# Patient Record
Sex: FEMALE | Race: WHITE | Employment: OTHER | URBAN - METROPOLITAN AREA
[De-identification: names, ages, dates, MRNs, and addresses within clinical notes are randomized per-mention and may not be internally consistent; named-entity substitution may affect disease eponyms.]

---

## 2018-11-09 ENCOUNTER — HOSPITAL ENCOUNTER (OUTPATIENT)
Dept: GENERAL RADIOLOGY | Age: 58
Discharge: HOME | End: 2018-11-09
Attending: SURGERY
Payer: COMMERCIAL

## 2018-11-09 ENCOUNTER — ANESTHESIA EVENT (OUTPATIENT)
Dept: SURGERY | Age: 58
DRG: 421 | End: 2018-11-09
Payer: COMMERCIAL

## 2018-11-09 ENCOUNTER — HOSPITAL ENCOUNTER (OUTPATIENT)
Dept: SURGERY | Age: 58
Discharge: HOME | End: 2018-11-09
Attending: SURGERY
Payer: COMMERCIAL

## 2018-11-09 ENCOUNTER — HOSPITAL ENCOUNTER (OUTPATIENT)
Dept: CT IMAGING | Age: 58
Discharge: HOME | End: 2018-11-09
Attending: SURGERY
Payer: COMMERCIAL

## 2018-11-09 VITALS
HEART RATE: 83 BPM | TEMPERATURE: 98 F | OXYGEN SATURATION: 100 % | WEIGHT: 220.38 LBS | HEIGHT: 62 IN | DIASTOLIC BLOOD PRESSURE: 58 MMHG | SYSTOLIC BLOOD PRESSURE: 118 MMHG | BODY MASS INDEX: 40.55 KG/M2 | RESPIRATION RATE: 18 BRPM

## 2018-11-09 DIAGNOSIS — K86.89 PANCREATIC MASS: ICD-10-CM

## 2018-11-09 LAB
ALBUMIN SERPL-MCNC: 3.6 G/DL (ref 3.5–5)
ALBUMIN/GLOB SERPL: 0.9 {RATIO} (ref 1.2–3.5)
ALP SERPL-CCNC: 106 U/L (ref 50–136)
ALT SERPL-CCNC: 26 U/L (ref 12–65)
ANION GAP SERPL CALC-SCNC: 7 MMOL/L (ref 7–16)
APPEARANCE UR: CLEAR
AST SERPL-CCNC: 17 U/L (ref 15–37)
BACTERIA URNS QL MICRO: 0 /HPF
BASOPHILS # BLD: 0.1 K/UL (ref 0–0.2)
BASOPHILS NFR BLD: 1 % (ref 0–2)
BILIRUB SERPL-MCNC: 0.4 MG/DL (ref 0.2–1.1)
BILIRUB UR QL: NEGATIVE
BUN SERPL-MCNC: 5 MG/DL (ref 6–23)
CALCIUM SERPL-MCNC: 8.9 MG/DL (ref 8.3–10.4)
CASTS URNS QL MICRO: ABNORMAL /LPF
CEA SERPL-MCNC: 5.2 NG/ML (ref 0–3)
CHLORIDE SERPL-SCNC: 99 MMOL/L (ref 98–107)
CO2 SERPL-SCNC: 30 MMOL/L (ref 21–32)
COLOR UR: YELLOW
CREAT BLD-MCNC: 0.7 MG/DL (ref 0.8–1.5)
CREAT SERPL-MCNC: 0.73 MG/DL (ref 0.6–1)
DIFFERENTIAL METHOD BLD: ABNORMAL
EOSINOPHIL # BLD: 0.3 K/UL (ref 0–0.8)
EOSINOPHIL NFR BLD: 2 % (ref 0.5–7.8)
EPI CELLS #/AREA URNS HPF: ABNORMAL /HPF
ERYTHROCYTE [DISTWIDTH] IN BLOOD BY AUTOMATED COUNT: 14.9 %
GLOBULIN SER CALC-MCNC: 4.1 G/DL (ref 2.3–3.5)
GLUCOSE SERPL-MCNC: 147 MG/DL (ref 65–100)
GLUCOSE UR STRIP.AUTO-MCNC: NEGATIVE MG/DL
HCT VFR BLD AUTO: 41.4 % (ref 35.8–46.3)
HGB BLD-MCNC: 13.6 G/DL (ref 11.7–15.4)
HGB UR QL STRIP: ABNORMAL
IMM GRANULOCYTES # BLD: 0.1 K/UL (ref 0–0.5)
IMM GRANULOCYTES NFR BLD AUTO: 0 % (ref 0–5)
INR PPP: 1.1
KETONES UR QL STRIP.AUTO: NEGATIVE MG/DL
LEUKOCYTE ESTERASE UR QL STRIP.AUTO: NEGATIVE
LYMPHOCYTES # BLD: 2.5 K/UL (ref 0.5–4.6)
LYMPHOCYTES NFR BLD: 18 % (ref 13–44)
MCH RBC QN AUTO: 32.4 PG (ref 26.1–32.9)
MCHC RBC AUTO-ENTMCNC: 32.9 G/DL (ref 31.4–35)
MCV RBC AUTO: 98.6 FL (ref 79.6–97.8)
MONOCYTES # BLD: 0.7 K/UL (ref 0.1–1.3)
MONOCYTES NFR BLD: 5 % (ref 4–12)
NEUTS SEG # BLD: 9.9 K/UL (ref 1.7–8.2)
NEUTS SEG NFR BLD: 73 % (ref 43–78)
NITRITE UR QL STRIP.AUTO: NEGATIVE
NRBC # BLD: 0 K/UL (ref 0–0.2)
PH UR STRIP: 5.5 [PH] (ref 5–9)
PLATELET # BLD AUTO: 402 K/UL (ref 150–450)
PMV BLD AUTO: 10.3 FL (ref 9.4–12.3)
POTASSIUM SERPL-SCNC: 3.3 MMOL/L (ref 3.5–5.1)
PROT SERPL-MCNC: 7.7 G/DL (ref 6.3–8.2)
PROT UR STRIP-MCNC: NEGATIVE MG/DL
PROTHROMBIN TIME: 13.8 SEC (ref 11.5–14.5)
RBC # BLD AUTO: 4.2 M/UL (ref 4.05–5.2)
RBC #/AREA URNS HPF: ABNORMAL /HPF
SODIUM SERPL-SCNC: 136 MMOL/L (ref 136–145)
SP GR UR REFRACTOMETRY: 1.02 (ref 1–1.02)
UROBILINOGEN UR QL STRIP.AUTO: 0.2 EU/DL (ref 0.2–1)
WBC # BLD AUTO: 13.6 K/UL (ref 4.3–11.1)
WBC URNS QL MICRO: ABNORMAL /HPF

## 2018-11-09 PROCEDURE — 74011636320 HC RX REV CODE- 636/320: Performed by: SURGERY

## 2018-11-09 PROCEDURE — 71260 CT THORAX DX C+: CPT

## 2018-11-09 PROCEDURE — 82565 ASSAY OF CREATININE: CPT

## 2018-11-09 PROCEDURE — 80053 COMPREHEN METABOLIC PANEL: CPT

## 2018-11-09 PROCEDURE — 93005 ELECTROCARDIOGRAM TRACING: CPT | Performed by: SURGERY

## 2018-11-09 PROCEDURE — 36415 COLL VENOUS BLD VENIPUNCTURE: CPT

## 2018-11-09 PROCEDURE — 71046 X-RAY EXAM CHEST 2 VIEWS: CPT

## 2018-11-09 PROCEDURE — 82378 CARCINOEMBRYONIC ANTIGEN: CPT

## 2018-11-09 PROCEDURE — 85610 PROTHROMBIN TIME: CPT

## 2018-11-09 PROCEDURE — 85025 COMPLETE CBC W/AUTO DIFF WBC: CPT

## 2018-11-09 PROCEDURE — 86301 IMMUNOASSAY TUMOR CA 19-9: CPT

## 2018-11-09 PROCEDURE — 74011000258 HC RX REV CODE- 258: Performed by: SURGERY

## 2018-11-09 PROCEDURE — 81001 URINALYSIS AUTO W/SCOPE: CPT

## 2018-11-09 RX ORDER — SODIUM CHLORIDE 0.9 % (FLUSH) 0.9 %
5-10 SYRINGE (ML) INJECTION AS NEEDED
Status: CANCELLED | OUTPATIENT
Start: 2018-11-09

## 2018-11-09 RX ORDER — SODIUM CHLORIDE 0.9 % (FLUSH) 0.9 %
5-10 SYRINGE (ML) INJECTION EVERY 8 HOURS
Status: CANCELLED | OUTPATIENT
Start: 2018-11-09

## 2018-11-09 RX ORDER — SODIUM CHLORIDE 0.9 % (FLUSH) 0.9 %
10 SYRINGE (ML) INJECTION
Status: COMPLETED | OUTPATIENT
Start: 2018-11-09 | End: 2018-11-09

## 2018-11-09 RX ADMIN — SODIUM CHLORIDE 100 ML: 900 INJECTION, SOLUTION INTRAVENOUS at 16:19

## 2018-11-09 RX ADMIN — Medication 10 ML: at 16:19

## 2018-11-09 RX ADMIN — IOPAMIDOL 80 ML: 755 INJECTION, SOLUTION INTRAVENOUS at 16:19

## 2018-11-09 NOTE — ANESTHESIA PREPROCEDURE EVALUATION
Anesthetic History No history of anesthetic complications Review of Systems / Medical History Patient summary reviewed and pertinent labs reviewed Pulmonary Smoker (Current everyday smoker) Neuro/Psych Within defined limits Cardiovascular Hypertension: well controlled Hyperlipidemia Exercise tolerance: >4 METS Comments: Denies CP, SOB or changes in functional status EKG--SR, RBBB  
GI/Hepatic/Renal 
Within defined limits Endo/Other Morbid obesity Other Findings Physical Exam 
 
Airway Mallampati: III 
TM Distance: 4 - 6 cm Neck ROM: normal range of motion Mouth opening: Normal 
 
 Cardiovascular Rhythm: regular Rate: normal 
 
 
 
 Dental 
 
Dentition: Caps/crowns Pulmonary Breath sounds clear to auscultation Abdominal 
GI exam deferred Other Findings Anesthetic Plan ASA: 3 Anesthesia type: general 
 
Monitoring Plan: Arterial line Induction: Intravenous Anesthetic plan and risks discussed with: Patient and Spouse

## 2018-11-10 LAB
ATRIAL RATE: 77 BPM
CALCULATED P AXIS, ECG09: 59 DEGREES
CALCULATED R AXIS, ECG10: -112 DEGREES
CALCULATED T AXIS, ECG11: 61 DEGREES
CANCER AG19-9 SERPL-ACNC: 572.4 U/ML (ref 2–37)
DIAGNOSIS, 93000: NORMAL
P-R INTERVAL, ECG05: 140 MS
Q-T INTERVAL, ECG07: 418 MS
QRS DURATION, ECG06: 122 MS
QTC CALCULATION (BEZET), ECG08: 473 MS
VENTRICULAR RATE, ECG03: 77 BPM

## 2018-11-12 ENCOUNTER — HOSPITAL ENCOUNTER (INPATIENT)
Age: 58
LOS: 7 days | Discharge: HOME | DRG: 421 | End: 2018-11-19
Attending: SURGERY | Admitting: SURGERY
Payer: COMMERCIAL

## 2018-11-12 ENCOUNTER — ANESTHESIA (OUTPATIENT)
Dept: SURGERY | Age: 58
DRG: 421 | End: 2018-11-12
Payer: COMMERCIAL

## 2018-11-12 DIAGNOSIS — K86.89 MASS OF PANCREAS: Primary | ICD-10-CM

## 2018-11-12 LAB
BASE EXCESS BLD CALC-SCNC: 2 MMOL/L
CA-I BLD-MCNC: 1.16 MMOL/L (ref 1.12–1.32)
GLUCOSE BLD STRIP.AUTO-MCNC: 191 MG/DL (ref 65–100)
HCO3 BLD-SCNC: 26 MMOL/L (ref 22–26)
PCO2 BLD: 38.5 MMHG (ref 35–45)
PH BLD: 7.44 [PH] (ref 7.35–7.45)
PO2 BLD: 105 MMHG (ref 75–100)
POTASSIUM BLD-SCNC: 3.5 MMOL/L (ref 3.5–5.1)
SAO2 % BLD: 98 % (ref 95–98)
SODIUM BLD-SCNC: 137 MMOL/L (ref 136–145)

## 2018-11-12 PROCEDURE — 77030008715 HC TU FEED GASTMY COVD -A: Performed by: SURGERY

## 2018-11-12 PROCEDURE — 74011000250 HC RX REV CODE- 250: Performed by: SURGERY

## 2018-11-12 PROCEDURE — 65270000029 HC RM PRIVATE

## 2018-11-12 PROCEDURE — 86901 BLOOD TYPING SEROLOGIC RH(D): CPT

## 2018-11-12 PROCEDURE — 77030020255 HC SOL INJ LR 1000ML BG

## 2018-11-12 PROCEDURE — 77030012406 HC DRN WND PENRS BARD -A: Performed by: SURGERY

## 2018-11-12 PROCEDURE — 77030005306 HC CATH NG RADPQ COVD -A: Performed by: SURGERY

## 2018-11-12 PROCEDURE — 74011250636 HC RX REV CODE- 250/636: Performed by: ANESTHESIOLOGY

## 2018-11-12 PROCEDURE — 88307 TISSUE EXAM BY PATHOLOGIST: CPT

## 2018-11-12 PROCEDURE — 74011250636 HC RX REV CODE- 250/636

## 2018-11-12 PROCEDURE — 77030027138 HC INCENT SPIROMETER -A

## 2018-11-12 PROCEDURE — 84295 ASSAY OF SERUM SODIUM: CPT

## 2018-11-12 PROCEDURE — 77030019908 HC STETH ESOPH SIMS -A: Performed by: ANESTHESIOLOGY

## 2018-11-12 PROCEDURE — 77030003029 HC SUT VCRL J&J -B: Performed by: SURGERY

## 2018-11-12 PROCEDURE — 77030034850: Performed by: SURGERY

## 2018-11-12 PROCEDURE — 0DB60ZZ EXCISION OF STOMACH, OPEN APPROACH: ICD-10-PCS | Performed by: SURGERY

## 2018-11-12 PROCEDURE — 77030031139 HC SUT VCRL2 J&J -A: Performed by: SURGERY

## 2018-11-12 PROCEDURE — 76060000038 HC ANESTHESIA 3.5 TO 4 HR: Performed by: SURGERY

## 2018-11-12 PROCEDURE — 77030014008 HC SPNG HEMSTAT J&J -C: Performed by: SURGERY

## 2018-11-12 PROCEDURE — 77030002967 HC SUT PDS J&J -B: Performed by: SURGERY

## 2018-11-12 PROCEDURE — 77030025303 HC STPLR ENDOSC J&J -G: Performed by: SURGERY

## 2018-11-12 PROCEDURE — 77030036736 HC RELD STPLR ENDOSC J&J -F: Performed by: SURGERY

## 2018-11-12 PROCEDURE — 07TP0ZZ RESECTION OF SPLEEN, OPEN APPROACH: ICD-10-PCS | Performed by: SURGERY

## 2018-11-12 PROCEDURE — 0D160ZA BYPASS STOMACH TO JEJUNUM, OPEN APPROACH: ICD-10-PCS | Performed by: SURGERY

## 2018-11-12 PROCEDURE — 0FBG0ZX EXCISION OF PANCREAS, OPEN APPROACH, DIAGNOSTIC: ICD-10-PCS | Performed by: SURGERY

## 2018-11-12 PROCEDURE — 74011000250 HC RX REV CODE- 250

## 2018-11-12 PROCEDURE — 76210000006 HC OR PH I REC 0.5 TO 1 HR: Performed by: SURGERY

## 2018-11-12 PROCEDURE — 77030019938 HC TBNG IV PCA ICUM -A

## 2018-11-12 PROCEDURE — 77030018846 HC SOL IRR STRL H20 ICUM -A: Performed by: SURGERY

## 2018-11-12 PROCEDURE — 77030006565 HC BG DRN BILE BARD -A: Performed by: SURGERY

## 2018-11-12 PROCEDURE — 77030002996 HC SUT SLK J&J -A: Performed by: SURGERY

## 2018-11-12 PROCEDURE — 77030020782 HC GWN BAIR PAWS FLX 3M -B: Performed by: ANESTHESIOLOGY

## 2018-11-12 PROCEDURE — 77030012411 HC DRN WND CARD -A: Performed by: SURGERY

## 2018-11-12 PROCEDURE — 77030037088 HC TUBE ENDOTRACH ORAL NSL COVD-A: Performed by: ANESTHESIOLOGY

## 2018-11-12 PROCEDURE — 77030018836 HC SOL IRR NACL ICUM -A: Performed by: SURGERY

## 2018-11-12 PROCEDURE — 88305 TISSUE EXAM BY PATHOLOGIST: CPT

## 2018-11-12 PROCEDURE — C9113 INJ PANTOPRAZOLE SODIUM, VIA: HCPCS | Performed by: SURGERY

## 2018-11-12 PROCEDURE — 74011000258 HC RX REV CODE- 258: Performed by: SURGERY

## 2018-11-12 PROCEDURE — 77030011264 HC ELECTRD BLD EXT COVD -A: Performed by: SURGERY

## 2018-11-12 PROCEDURE — 77030008771 HC TU NG SALEM SUMP -A: Performed by: ANESTHESIOLOGY

## 2018-11-12 PROCEDURE — 77030008771 HC TU NG SALEM SUMP -A: Performed by: SURGERY

## 2018-11-12 PROCEDURE — 74011250637 HC RX REV CODE- 250/637: Performed by: ANESTHESIOLOGY

## 2018-11-12 PROCEDURE — 77030002986 HC SUT PROL J&J -A: Performed by: SURGERY

## 2018-11-12 PROCEDURE — 77030008522 HC TBNG INSUF LAPRO STRY -B: Performed by: SURGERY

## 2018-11-12 PROCEDURE — 77030036732 HC RELD STPLR VASC J&J -F: Performed by: SURGERY

## 2018-11-12 PROCEDURE — 77030034849: Performed by: SURGERY

## 2018-11-12 PROCEDURE — 76010000174 HC OR TIME 3.5 TO 4 HR INTENSV-TIER 1: Performed by: SURGERY

## 2018-11-12 PROCEDURE — 77030034155 HC SHR COAG HARM ACE J&J -E: Performed by: SURGERY

## 2018-11-12 PROCEDURE — 77030022473 HC HNDL ENDO GIA UNIV USDA -C: Performed by: SURGERY

## 2018-11-12 PROCEDURE — 77030003503 HC NDL BIOP TISS BD -B: Performed by: SURGERY

## 2018-11-12 PROCEDURE — 77030008467 HC STPLR SKN COVD -B: Performed by: SURGERY

## 2018-11-12 PROCEDURE — 82803 BLOOD GASES ANY COMBINATION: CPT

## 2018-11-12 PROCEDURE — 77030013292 HC BOWL MX PRSM J&J -A: Performed by: ANESTHESIOLOGY

## 2018-11-12 PROCEDURE — 77030035048 HC TRCR ENDOSC OPTCL COVD -B: Performed by: SURGERY

## 2018-11-12 PROCEDURE — 77030027876 HC STPLR ENDOSC FLX PWR J&J -G1: Performed by: SURGERY

## 2018-11-12 PROCEDURE — 77030016151 HC PROTCTR LNS DFOG COVD -B: Performed by: SURGERY

## 2018-11-12 PROCEDURE — 77030039425 HC BLD LARYNG TRULITE DISP TELE -A: Performed by: ANESTHESIOLOGY

## 2018-11-12 PROCEDURE — 77030032490 HC SLV COMPR SCD KNE COVD -B: Performed by: SURGERY

## 2018-11-12 PROCEDURE — 77030010294 HC STPLR INT TI J&J -C: Performed by: SURGERY

## 2018-11-12 PROCEDURE — 77030005401 HC CATH RAD ARRO -A: Performed by: ANESTHESIOLOGY

## 2018-11-12 PROCEDURE — 77030010296 HC STPLR INT COVD -B: Performed by: SURGERY

## 2018-11-12 PROCEDURE — 77030009985 HC RELD STPLR XR J&J -B: Performed by: SURGERY

## 2018-11-12 PROCEDURE — 74011250636 HC RX REV CODE- 250/636: Performed by: SURGERY

## 2018-11-12 PROCEDURE — 86923 COMPATIBILITY TEST ELECTRIC: CPT

## 2018-11-12 PROCEDURE — 77030013794 HC KT TRNSDUC BLD EDWD -B: Performed by: ANESTHESIOLOGY

## 2018-11-12 PROCEDURE — 77030020407 HC IV BLD WRMR ST 3M -A: Performed by: ANESTHESIOLOGY

## 2018-11-12 PROCEDURE — 77030013567 HC DRN WND RESERV BARD -A: Performed by: SURGERY

## 2018-11-12 PROCEDURE — 77030009978 HC RELD STPLR TCR J&J -B: Performed by: SURGERY

## 2018-11-12 PROCEDURE — 77030002966 HC SUT PDS J&J -A: Performed by: SURGERY

## 2018-11-12 RX ORDER — FENTANYL CITRATE 50 UG/ML
25 INJECTION, SOLUTION INTRAMUSCULAR; INTRAVENOUS ONCE
Status: DISCONTINUED | OUTPATIENT
Start: 2018-11-12 | End: 2018-11-12 | Stop reason: HOSPADM

## 2018-11-12 RX ORDER — ROCURONIUM BROMIDE 10 MG/ML
INJECTION, SOLUTION INTRAVENOUS AS NEEDED
Status: DISCONTINUED | OUTPATIENT
Start: 2018-11-12 | End: 2018-11-12 | Stop reason: HOSPADM

## 2018-11-12 RX ORDER — SODIUM CHLORIDE, SODIUM LACTATE, POTASSIUM CHLORIDE, CALCIUM CHLORIDE 600; 310; 30; 20 MG/100ML; MG/100ML; MG/100ML; MG/100ML
75 INJECTION, SOLUTION INTRAVENOUS CONTINUOUS
Status: DISCONTINUED | OUTPATIENT
Start: 2018-11-12 | End: 2018-11-16

## 2018-11-12 RX ORDER — HYDROMORPHONE HYDROCHLORIDE 1 MG/ML
1 INJECTION, SOLUTION INTRAMUSCULAR; INTRAVENOUS; SUBCUTANEOUS
Status: COMPLETED | OUTPATIENT
Start: 2018-11-12 | End: 2018-11-12

## 2018-11-12 RX ORDER — SODIUM CHLORIDE, SODIUM LACTATE, POTASSIUM CHLORIDE, CALCIUM CHLORIDE 600; 310; 30; 20 MG/100ML; MG/100ML; MG/100ML; MG/100ML
100 INJECTION, SOLUTION INTRAVENOUS CONTINUOUS
Status: DISCONTINUED | OUTPATIENT
Start: 2018-11-12 | End: 2018-11-12 | Stop reason: HOSPADM

## 2018-11-12 RX ORDER — GABAPENTIN 300 MG/1
600 CAPSULE ORAL ONCE
Status: COMPLETED | OUTPATIENT
Start: 2018-11-12 | End: 2018-11-12

## 2018-11-12 RX ORDER — PROPOFOL 10 MG/ML
INJECTION, EMULSION INTRAVENOUS AS NEEDED
Status: DISCONTINUED | OUTPATIENT
Start: 2018-11-12 | End: 2018-11-12 | Stop reason: HOSPADM

## 2018-11-12 RX ORDER — SODIUM CHLORIDE 0.9 % (FLUSH) 0.9 %
5-10 SYRINGE (ML) INJECTION EVERY 8 HOURS
Status: DISCONTINUED | OUTPATIENT
Start: 2018-11-12 | End: 2018-11-12 | Stop reason: HOSPADM

## 2018-11-12 RX ORDER — ONDANSETRON 2 MG/ML
INJECTION INTRAMUSCULAR; INTRAVENOUS AS NEEDED
Status: DISCONTINUED | OUTPATIENT
Start: 2018-11-12 | End: 2018-11-12 | Stop reason: HOSPADM

## 2018-11-12 RX ORDER — VECURONIUM BROMIDE FOR INJECTION 1 MG/ML
INJECTION, POWDER, LYOPHILIZED, FOR SOLUTION INTRAVENOUS AS NEEDED
Status: DISCONTINUED | OUTPATIENT
Start: 2018-11-12 | End: 2018-11-12 | Stop reason: HOSPADM

## 2018-11-12 RX ORDER — OXYCODONE HYDROCHLORIDE 5 MG/1
5 TABLET ORAL
Status: DISCONTINUED | OUTPATIENT
Start: 2018-11-12 | End: 2018-11-12 | Stop reason: HOSPADM

## 2018-11-12 RX ORDER — ENOXAPARIN SODIUM 100 MG/ML
40 INJECTION SUBCUTANEOUS EVERY 24 HOURS
Status: DISCONTINUED | OUTPATIENT
Start: 2018-11-12 | End: 2018-11-19 | Stop reason: HOSPADM

## 2018-11-12 RX ORDER — SODIUM CHLORIDE 9 MG/ML
50 INJECTION, SOLUTION INTRAVENOUS CONTINUOUS
Status: DISCONTINUED | OUTPATIENT
Start: 2018-11-12 | End: 2018-11-12 | Stop reason: HOSPADM

## 2018-11-12 RX ORDER — SODIUM CHLORIDE 0.9 % (FLUSH) 0.9 %
5-10 SYRINGE (ML) INJECTION AS NEEDED
Status: DISCONTINUED | OUTPATIENT
Start: 2018-11-12 | End: 2018-11-19 | Stop reason: HOSPADM

## 2018-11-12 RX ORDER — FAMOTIDINE 20 MG/1
20 TABLET, FILM COATED ORAL ONCE
Status: COMPLETED | OUTPATIENT
Start: 2018-11-12 | End: 2018-11-12

## 2018-11-12 RX ORDER — SODIUM CHLORIDE 0.9 % (FLUSH) 0.9 %
5-10 SYRINGE (ML) INJECTION AS NEEDED
Status: DISCONTINUED | OUTPATIENT
Start: 2018-11-12 | End: 2018-11-12 | Stop reason: HOSPADM

## 2018-11-12 RX ORDER — HYDROMORPHONE HYDROCHLORIDE 2 MG/ML
0.5 INJECTION, SOLUTION INTRAMUSCULAR; INTRAVENOUS; SUBCUTANEOUS
Status: DISCONTINUED | OUTPATIENT
Start: 2018-11-12 | End: 2018-11-12 | Stop reason: HOSPADM

## 2018-11-12 RX ORDER — SODIUM CHLORIDE 0.9 % (FLUSH) 0.9 %
5-10 SYRINGE (ML) INJECTION EVERY 8 HOURS
Status: DISCONTINUED | OUTPATIENT
Start: 2018-11-12 | End: 2018-11-19 | Stop reason: HOSPADM

## 2018-11-12 RX ORDER — MIDAZOLAM HYDROCHLORIDE 1 MG/ML
2 INJECTION, SOLUTION INTRAMUSCULAR; INTRAVENOUS
Status: COMPLETED | OUTPATIENT
Start: 2018-11-12 | End: 2018-11-12

## 2018-11-12 RX ORDER — SODIUM CHLORIDE 9 MG/ML
250 INJECTION, SOLUTION INTRAVENOUS AS NEEDED
Status: DISCONTINUED | OUTPATIENT
Start: 2018-11-12 | End: 2018-11-19 | Stop reason: HOSPADM

## 2018-11-12 RX ORDER — EPHEDRINE SULFATE 50 MG/ML
INJECTION, SOLUTION INTRAVENOUS AS NEEDED
Status: DISCONTINUED | OUTPATIENT
Start: 2018-11-12 | End: 2018-11-12 | Stop reason: HOSPADM

## 2018-11-12 RX ORDER — FENTANYL CITRATE 50 UG/ML
INJECTION, SOLUTION INTRAMUSCULAR; INTRAVENOUS AS NEEDED
Status: DISCONTINUED | OUTPATIENT
Start: 2018-11-12 | End: 2018-11-12 | Stop reason: HOSPADM

## 2018-11-12 RX ORDER — NEOSTIGMINE METHYLSULFATE 1 MG/ML
INJECTION INTRAVENOUS AS NEEDED
Status: DISCONTINUED | OUTPATIENT
Start: 2018-11-12 | End: 2018-11-12 | Stop reason: HOSPADM

## 2018-11-12 RX ORDER — HYDROMORPHONE HYDROCHLORIDE 2 MG/ML
INJECTION, SOLUTION INTRAMUSCULAR; INTRAVENOUS; SUBCUTANEOUS AS NEEDED
Status: DISCONTINUED | OUTPATIENT
Start: 2018-11-12 | End: 2018-11-12 | Stop reason: HOSPADM

## 2018-11-12 RX ORDER — LIDOCAINE HYDROCHLORIDE 10 MG/ML
0.1 INJECTION INFILTRATION; PERINEURAL AS NEEDED
Status: DISCONTINUED | OUTPATIENT
Start: 2018-11-12 | End: 2018-11-12 | Stop reason: HOSPADM

## 2018-11-12 RX ORDER — ACETAMINOPHEN 500 MG
1000 TABLET ORAL ONCE
Status: COMPLETED | OUTPATIENT
Start: 2018-11-12 | End: 2018-11-12

## 2018-11-12 RX ORDER — OXYCODONE AND ACETAMINOPHEN 5; 325 MG/1; MG/1
1 TABLET ORAL AS NEEDED
Status: DISCONTINUED | OUTPATIENT
Start: 2018-11-12 | End: 2018-11-12 | Stop reason: HOSPADM

## 2018-11-12 RX ORDER — GLYCOPYRROLATE 0.2 MG/ML
INJECTION INTRAMUSCULAR; INTRAVENOUS AS NEEDED
Status: DISCONTINUED | OUTPATIENT
Start: 2018-11-12 | End: 2018-11-12 | Stop reason: HOSPADM

## 2018-11-12 RX ORDER — ONDANSETRON 2 MG/ML
4 INJECTION INTRAMUSCULAR; INTRAVENOUS
Status: DISCONTINUED | OUTPATIENT
Start: 2018-11-12 | End: 2018-11-19 | Stop reason: HOSPADM

## 2018-11-12 RX ORDER — MIDAZOLAM HYDROCHLORIDE 1 MG/ML
2 INJECTION, SOLUTION INTRAMUSCULAR; INTRAVENOUS ONCE
Status: DISCONTINUED | OUTPATIENT
Start: 2018-11-12 | End: 2018-11-12 | Stop reason: HOSPADM

## 2018-11-12 RX ORDER — DIPHENHYDRAMINE HYDROCHLORIDE 50 MG/ML
12.5 INJECTION, SOLUTION INTRAMUSCULAR; INTRAVENOUS ONCE
Status: DISCONTINUED | OUTPATIENT
Start: 2018-11-12 | End: 2018-11-12 | Stop reason: HOSPADM

## 2018-11-12 RX ORDER — SODIUM CHLORIDE, SODIUM LACTATE, POTASSIUM CHLORIDE, CALCIUM CHLORIDE 600; 310; 30; 20 MG/100ML; MG/100ML; MG/100ML; MG/100ML
INJECTION, SOLUTION INTRAVENOUS
Status: DISCONTINUED | OUTPATIENT
Start: 2018-11-12 | End: 2018-11-12 | Stop reason: HOSPADM

## 2018-11-12 RX ORDER — LIDOCAINE HYDROCHLORIDE 20 MG/ML
INJECTION, SOLUTION EPIDURAL; INFILTRATION; INTRACAUDAL; PERINEURAL AS NEEDED
Status: DISCONTINUED | OUTPATIENT
Start: 2018-11-12 | End: 2018-11-12 | Stop reason: HOSPADM

## 2018-11-12 RX ADMIN — SODIUM CHLORIDE, SODIUM LACTATE, POTASSIUM CHLORIDE, CALCIUM CHLORIDE: 600; 310; 30; 20 INJECTION, SOLUTION INTRAVENOUS at 13:39

## 2018-11-12 RX ADMIN — EPHEDRINE SULFATE 5 MG: 50 INJECTION, SOLUTION INTRAVENOUS at 16:05

## 2018-11-12 RX ADMIN — Medication 10 ML: at 21:59

## 2018-11-12 RX ADMIN — GABAPENTIN 600 MG: 300 CAPSULE ORAL at 12:52

## 2018-11-12 RX ADMIN — SODIUM CHLORIDE, SODIUM LACTATE, POTASSIUM CHLORIDE, CALCIUM CHLORIDE: 600; 310; 30; 20 INJECTION, SOLUTION INTRAVENOUS at 15:12

## 2018-11-12 RX ADMIN — ROCURONIUM BROMIDE 50 MG: 10 INJECTION, SOLUTION INTRAVENOUS at 13:22

## 2018-11-12 RX ADMIN — SODIUM CHLORIDE, SODIUM LACTATE, POTASSIUM CHLORIDE, AND CALCIUM CHLORIDE 125 ML/HR: 600; 310; 30; 20 INJECTION, SOLUTION INTRAVENOUS at 19:08

## 2018-11-12 RX ADMIN — EPHEDRINE SULFATE 10 MG: 50 INJECTION, SOLUTION INTRAVENOUS at 13:33

## 2018-11-12 RX ADMIN — HYDROMORPHONE HYDROCHLORIDE 0.5 MG: 2 INJECTION, SOLUTION INTRAMUSCULAR; INTRAVENOUS; SUBCUTANEOUS at 17:21

## 2018-11-12 RX ADMIN — VECURONIUM BROMIDE FOR INJECTION 2 MG: 1 INJECTION, POWDER, LYOPHILIZED, FOR SOLUTION INTRAVENOUS at 15:48

## 2018-11-12 RX ADMIN — FENTANYL CITRATE 50 MCG: 50 INJECTION, SOLUTION INTRAMUSCULAR; INTRAVENOUS at 13:53

## 2018-11-12 RX ADMIN — NEOSTIGMINE METHYLSULFATE 5 MG: 1 INJECTION INTRAVENOUS at 16:51

## 2018-11-12 RX ADMIN — EPHEDRINE SULFATE 2.5 MG: 50 INJECTION, SOLUTION INTRAVENOUS at 15:47

## 2018-11-12 RX ADMIN — SODIUM CHLORIDE, SODIUM LACTATE, POTASSIUM CHLORIDE, AND CALCIUM CHLORIDE 100 ML/HR: 600; 310; 30; 20 INJECTION, SOLUTION INTRAVENOUS at 12:54

## 2018-11-12 RX ADMIN — VECURONIUM BROMIDE FOR INJECTION 2 MG: 1 INJECTION, POWDER, LYOPHILIZED, FOR SOLUTION INTRAVENOUS at 15:15

## 2018-11-12 RX ADMIN — VECURONIUM BROMIDE FOR INJECTION 2 MG: 1 INJECTION, POWDER, LYOPHILIZED, FOR SOLUTION INTRAVENOUS at 14:25

## 2018-11-12 RX ADMIN — PROPOFOL 180 MG: 10 INJECTION, EMULSION INTRAVENOUS at 13:22

## 2018-11-12 RX ADMIN — ONDANSETRON 4 MG: 2 INJECTION INTRAMUSCULAR; INTRAVENOUS at 16:45

## 2018-11-12 RX ADMIN — CEFOXITIN 2 G: 2 INJECTION, POWDER, FOR SOLUTION INTRAVENOUS at 15:26

## 2018-11-12 RX ADMIN — CEFOXITIN 2 G: 2 INJECTION, POWDER, FOR SOLUTION INTRAVENOUS at 13:30

## 2018-11-12 RX ADMIN — CEFOXITIN 2 G: 2 INJECTION, POWDER, FOR SOLUTION INTRAVENOUS at 21:58

## 2018-11-12 RX ADMIN — MIDAZOLAM HYDROCHLORIDE 2 MG: 2 INJECTION, SOLUTION INTRAMUSCULAR; INTRAVENOUS at 13:03

## 2018-11-12 RX ADMIN — LIDOCAINE HYDROCHLORIDE 70 MG: 20 INJECTION, SOLUTION EPIDURAL; INFILTRATION; INTRACAUDAL; PERINEURAL at 13:21

## 2018-11-12 RX ADMIN — EPHEDRINE SULFATE 5 MG: 50 INJECTION, SOLUTION INTRAVENOUS at 15:14

## 2018-11-12 RX ADMIN — EPHEDRINE SULFATE 5 MG: 50 INJECTION, SOLUTION INTRAVENOUS at 14:07

## 2018-11-12 RX ADMIN — EPHEDRINE SULFATE 5 MG: 50 INJECTION, SOLUTION INTRAVENOUS at 15:04

## 2018-11-12 RX ADMIN — ACETAMINOPHEN 1000 MG: 500 TABLET, FILM COATED ORAL at 12:52

## 2018-11-12 RX ADMIN — GLYCOPYRROLATE 0.6 MG: 0.2 INJECTION INTRAMUSCULAR; INTRAVENOUS at 16:51

## 2018-11-12 RX ADMIN — HYDROMORPHONE HYDROCHLORIDE 0.5 MG: 2 INJECTION, SOLUTION INTRAMUSCULAR; INTRAVENOUS; SUBCUTANEOUS at 13:59

## 2018-11-12 RX ADMIN — FAMOTIDINE 20 MG: 20 TABLET ORAL at 12:52

## 2018-11-12 RX ADMIN — HYDROMORPHONE HYDROCHLORIDE: 2 INJECTION INTRAMUSCULAR; INTRAVENOUS; SUBCUTANEOUS at 19:47

## 2018-11-12 RX ADMIN — SODIUM CHLORIDE 40 MG: 9 INJECTION, SOLUTION INTRAMUSCULAR; INTRAVENOUS; SUBCUTANEOUS at 21:58

## 2018-11-12 RX ADMIN — Medication 10 ML: at 18:40

## 2018-11-12 RX ADMIN — HYDROMORPHONE HYDROCHLORIDE 0.2 MG: 2 INJECTION, SOLUTION INTRAMUSCULAR; INTRAVENOUS; SUBCUTANEOUS at 15:52

## 2018-11-12 RX ADMIN — ENOXAPARIN SODIUM 40 MG: 40 INJECTION, SOLUTION INTRAVENOUS; SUBCUTANEOUS at 21:57

## 2018-11-12 RX ADMIN — HYDROMORPHONE HYDROCHLORIDE 1 MG: 1 INJECTION, SOLUTION INTRAMUSCULAR; INTRAVENOUS; SUBCUTANEOUS at 18:40

## 2018-11-12 RX ADMIN — FENTANYL CITRATE 150 MCG: 50 INJECTION, SOLUTION INTRAMUSCULAR; INTRAVENOUS at 13:21

## 2018-11-12 RX ADMIN — HYDROMORPHONE HYDROCHLORIDE 0.5 MG: 2 INJECTION, SOLUTION INTRAMUSCULAR; INTRAVENOUS; SUBCUTANEOUS at 17:26

## 2018-11-12 NOTE — ANESTHESIA POSTPROCEDURE EVALUATION
Procedure(s): OPEN SPLENECTOMY 
LAPAROSCOPY GENERAL DIAGNOSTIC 
LAPAROTOMY EXPLORATORY 
OPEN DISTAL PANCREATECTOMY. Anesthesia Post Evaluation Multimodal analgesia: multimodal analgesia used between 6 hours prior to anesthesia start to PACU discharge Patient location during evaluation: PACU Patient participation: complete - patient participated Level of consciousness: responsive to verbal stimuli Pain management: adequate Airway patency: patent Anesthetic complications: no 
Cardiovascular status: acceptable and hemodynamically stable Respiratory status: acceptable, spontaneous ventilation and nonlabored ventilation Hydration status: acceptable Comments: No Nausea Post anesthesia nausea and vomiting:  none Visit Vitals /61 Pulse 77 Temp 36.6 °C (97.8 °F) Resp 17 Ht 5' 2\" (1.575 m) Wt 99.6 kg (219 lb 8 oz) SpO2 99% BMI 40.15 kg/m²

## 2018-11-12 NOTE — PERIOP NOTES
TRANSFER - OUT REPORT: 
 
Verbal report given to [facility] -  SANTY RN(name) on Bakari Aguilera  being transferred to [facility](unit) for routine post - op Report consisted of patients Situation, Background, Assessment and  
Recommendations(SBAR). Information from the following report(s) SBAR, OR Summary and Procedure Summary was reviewed with the receiving nurse. Lines:  
Peripheral IV 11/12/18 Right Hand (Active) Site Assessment Clean, dry, & intact 11/12/2018  5:08 PM  
Phlebitis Assessment 0 11/12/2018  5:08 PM  
Infiltration Assessment 0 11/12/2018  5:08 PM  
Dressing Status Clean, dry, & intact; Occlusive 11/12/2018  5:08 PM  
Dressing Type Transparent;Tape 11/12/2018  5:08 PM  
Hub Color/Line Status Green;Capped 11/12/2018  5:08 PM  
Alcohol Cap Used No 11/12/2018  5:08 PM  
   
Peripheral IV 11/12/18 Right Wrist (Active) Site Assessment Clean, dry, & intact 11/12/2018  5:08 PM  
Phlebitis Assessment 0 11/12/2018  5:08 PM  
Infiltration Assessment 0 11/12/2018  5:08 PM  
Dressing Status Clean, dry, & intact; Occlusive 11/12/2018  5:08 PM  
Dressing Type Transparent;Tape 11/12/2018  5:08 PM  
Hub Color/Line Status Pink; Infusing 11/12/2018  5:08 PM  
Alcohol Cap Used No 11/12/2018  5:08 PM  
   
Arterial Line 11/12/18 Left Radial artery (Active) Site Assessment Clean, dry, & intact 11/12/2018  5:08 PM  
Dressing Status Clean, dry, & intact 11/12/2018  5:08 PM  
Dressing Type Transparent 11/12/2018  5:08 PM  
Line Status Intact and in place 11/12/2018  5:08 PM  
Treatment Zeroed or re-zeroed 11/12/2018  5:08 PM  
  
 
Opportunity for questions and clarification was provided. Patient transported with: 
 O2 @ 2 liters VTE prophylaxis orders have been written for Bakari Aguilera. Patient and family given floor number and nurses name. Family updated re: pt status after security code verified.

## 2018-11-12 NOTE — ANESTHESIA PROCEDURE NOTES
Arterial Line Placement Start time: 11/12/2018 1:25 PM 
End time: 11/12/2018 1:36 PM 
Performed by: Myrle Duverney, MD 
Authorized by: Myrle Duverney, MD  
 
Pre-Procedure Indications:  Arterial pressure monitoring and blood sampling Preanesthetic Checklist: patient identified, risks and benefits discussed, anesthesia consent, patient being monitored and patient being monitored Procedure:  
Prep:  Chlorhexidine Seldinger Technique?: Yes Orientation:  Left Location:  Radial artery Catheter size:  20 G Number of attempts:  3 Cont Cardiac Output Sensor: No   
 
Assessment:  
Post-procedure:  Line secured and sterile dressing applied Patient Tolerance:  Patient tolerated the procedure well with no immediate complications

## 2018-11-12 NOTE — BRIEF OP NOTE
BRIEF OPERATIVE NOTE Date of Procedure: 11/12/2018 Preoperative Diagnosis: Pancreatic cancer Postoperative Diagnosis: Locally advanced Pancreatic cancer Procedure(s): LAPAROSCOPY GENERAL DIAGNOSTIC 
LAPAROTOMY EXPLORATORY Partial gastrectomy Pancreatic core biopsy 
gastrojejunostomy Splenectomy Surgeon(s) and Role: 
   Nabila Lange MD - Primary Surgical Assistant: Joan Mackenzie NP Surgical Staff: 
Circ-1: Mary Lewis RN 
Circ-2: Ioana Echols RN 
Circ-Relief: Pato Alarcon RN; Shanda Martines RN Scrub Tech-1: Trupti Feldman Scrub Tech-Relief: [place] Nurse Practitioner: Mildred Abernathy NP Event Time In Time Out Incision Start  1:44 PM   
Incision Close  4:50 PM   
 
Anesthesia: General  
Estimated Blood Loss: 1000 ml Specimens:  
ID Type Source Tests Collected by Time Destination 1 : suprapancreatic lymph node Preservative   Kacy Matthews MD 11/12/2018  3:30 PM Pathology 2 : Stomach    Kacy Matthews MD 11/12/2018  4:04 PM Pathology 3 : pancreas tail mass Preservative   Kacy Matthews MD 11/12/2018  4:06 PM Pathology 4 : Spleen Govind Barrow MD 11/12/2018  4:17 PM Pathology Findings:  
1. No peritoneal disease 2. However, large pancreatic body and tail mass with invasion of back wall of stomach and invasion through ligament of Treitz into the duodenum and around the root of mesentery, at that point, I believe this is unresectable Complications: none Implants: ERIN x 1

## 2018-11-12 NOTE — PROGRESS NOTES
's pre-surgical visit requested by patient. Conveyed care and concern for patient and family in surgery waiting room. Offered prayer as requested for patient, family, and staff. Carliss Nissen, MDiv, BS Board Certified [employer]

## 2018-11-12 NOTE — PROGRESS NOTES
TRANSFER - IN REPORT: 
 
Verbal report received from KEON Bush (name) on Elliot Roldan  being received from PACU (unit) for routine progression of care Report consisted of patients Situation, Background, Assessment and  
Recommendations(SBAR). Information from the following report(s) SBAR, Kardex, OR Summary, Intake/Output, MAR, Accordion and Recent Results was reviewed with the receiving nurse. Opportunity for questions and clarification was provided. Assessment completed upon patients arrival to unit and care assumed.

## 2018-11-13 LAB
ANION GAP SERPL CALC-SCNC: 6 MMOL/L (ref 7–16)
BASOPHILS # BLD: 0.3 K/UL (ref 0–0.2)
BASOPHILS NFR BLD: 1 % (ref 0–2)
BUN SERPL-MCNC: 12 MG/DL (ref 6–23)
CALCIUM SERPL-MCNC: 8.4 MG/DL (ref 8.3–10.4)
CHLORIDE SERPL-SCNC: 103 MMOL/L (ref 98–107)
CO2 SERPL-SCNC: 28 MMOL/L (ref 21–32)
CREAT SERPL-MCNC: 0.73 MG/DL (ref 0.6–1)
DIFFERENTIAL METHOD BLD: ABNORMAL
EOSINOPHIL # BLD: 0 K/UL (ref 0–0.8)
EOSINOPHIL NFR BLD: 0 % (ref 0.5–7.8)
ERYTHROCYTE [DISTWIDTH] IN BLOOD BY AUTOMATED COUNT: 14.8 %
GLUCOSE SERPL-MCNC: 142 MG/DL (ref 65–100)
HCT VFR BLD AUTO: 34.5 % (ref 35.8–46.3)
HGB BLD-MCNC: 11 G/DL (ref 11.7–15.4)
IMM GRANULOCYTES # BLD: 0.3 K/UL (ref 0–0.5)
IMM GRANULOCYTES NFR BLD AUTO: 1 % (ref 0–5)
LYMPHOCYTES # BLD: 1 K/UL (ref 0.5–4.6)
LYMPHOCYTES NFR BLD: 4 % (ref 13–44)
MCH RBC QN AUTO: 32.1 PG (ref 26.1–32.9)
MCHC RBC AUTO-ENTMCNC: 31.9 G/DL (ref 31.4–35)
MCV RBC AUTO: 100.6 FL (ref 79.6–97.8)
MONOCYTES # BLD: 1.5 K/UL (ref 0.1–1.3)
MONOCYTES NFR BLD: 6 % (ref 4–12)
NEUTS SEG # BLD: 21.9 K/UL (ref 1.7–8.2)
NEUTS SEG NFR BLD: 88 % (ref 43–78)
NRBC # BLD: 0 K/UL (ref 0–0.2)
PLATELET # BLD AUTO: 363 K/UL (ref 150–450)
PMV BLD AUTO: 10.5 FL (ref 9.4–12.3)
POTASSIUM SERPL-SCNC: 4.4 MMOL/L (ref 3.5–5.1)
RBC # BLD AUTO: 3.43 M/UL (ref 4.05–5.2)
SODIUM SERPL-SCNC: 137 MMOL/L (ref 136–145)
WBC # BLD AUTO: 25 K/UL (ref 4.3–11.1)

## 2018-11-13 PROCEDURE — 65270000029 HC RM PRIVATE

## 2018-11-13 PROCEDURE — 94760 N-INVAS EAR/PLS OXIMETRY 1: CPT

## 2018-11-13 PROCEDURE — 77030020263 HC SOL INJ SOD CL0.9% LFCR 1000ML

## 2018-11-13 PROCEDURE — 74011000258 HC RX REV CODE- 258: Performed by: SURGERY

## 2018-11-13 PROCEDURE — 77010033678 HC OXYGEN DAILY

## 2018-11-13 PROCEDURE — 77030020255 HC SOL INJ LR 1000ML BG

## 2018-11-13 PROCEDURE — 80048 BASIC METABOLIC PNL TOTAL CA: CPT

## 2018-11-13 PROCEDURE — C9113 INJ PANTOPRAZOLE SODIUM, VIA: HCPCS | Performed by: SURGERY

## 2018-11-13 PROCEDURE — 74011000250 HC RX REV CODE- 250: Performed by: SURGERY

## 2018-11-13 PROCEDURE — 74011250636 HC RX REV CODE- 250/636: Performed by: SURGERY

## 2018-11-13 PROCEDURE — 36415 COLL VENOUS BLD VENIPUNCTURE: CPT

## 2018-11-13 PROCEDURE — 85025 COMPLETE CBC W/AUTO DIFF WBC: CPT

## 2018-11-13 PROCEDURE — 74011250636 HC RX REV CODE- 250/636: Performed by: PHYSICIAN ASSISTANT

## 2018-11-13 RX ORDER — LORAZEPAM 2 MG/ML
1 INJECTION INTRAMUSCULAR
Status: DISCONTINUED | OUTPATIENT
Start: 2018-11-13 | End: 2018-11-18

## 2018-11-13 RX ORDER — LORAZEPAM 2 MG/ML
1 INJECTION INTRAMUSCULAR ONCE
Status: COMPLETED | OUTPATIENT
Start: 2018-11-13 | End: 2018-11-13

## 2018-11-13 RX ADMIN — HYDROMORPHONE HYDROCHLORIDE: 2 INJECTION INTRAMUSCULAR; INTRAVENOUS; SUBCUTANEOUS at 19:09

## 2018-11-13 RX ADMIN — SODIUM CHLORIDE, SODIUM LACTATE, POTASSIUM CHLORIDE, AND CALCIUM CHLORIDE 125 ML/HR: 600; 310; 30; 20 INJECTION, SOLUTION INTRAVENOUS at 15:15

## 2018-11-13 RX ADMIN — CEFOXITIN 2 G: 2 INJECTION, POWDER, FOR SOLUTION INTRAVENOUS at 06:45

## 2018-11-13 RX ADMIN — SODIUM CHLORIDE 500 ML: 900 INJECTION, SOLUTION INTRAVENOUS at 10:46

## 2018-11-13 RX ADMIN — SODIUM CHLORIDE, SODIUM LACTATE, POTASSIUM CHLORIDE, AND CALCIUM CHLORIDE 150 ML/HR: 600; 310; 30; 20 INJECTION, SOLUTION INTRAVENOUS at 23:21

## 2018-11-13 RX ADMIN — Medication 10 ML: at 15:15

## 2018-11-13 RX ADMIN — LORAZEPAM 1 MG: 2 INJECTION INTRAMUSCULAR; INTRAVENOUS at 10:14

## 2018-11-13 RX ADMIN — HYDROMORPHONE HYDROCHLORIDE: 2 INJECTION INTRAMUSCULAR; INTRAVENOUS; SUBCUTANEOUS at 07:03

## 2018-11-13 RX ADMIN — SODIUM CHLORIDE, SODIUM LACTATE, POTASSIUM CHLORIDE, AND CALCIUM CHLORIDE 125 ML/HR: 600; 310; 30; 20 INJECTION, SOLUTION INTRAVENOUS at 04:48

## 2018-11-13 RX ADMIN — SODIUM CHLORIDE 40 MG: 9 INJECTION, SOLUTION INTRAMUSCULAR; INTRAVENOUS; SUBCUTANEOUS at 08:35

## 2018-11-13 RX ADMIN — SODIUM CHLORIDE 40 MG: 9 INJECTION, SOLUTION INTRAMUSCULAR; INTRAVENOUS; SUBCUTANEOUS at 22:01

## 2018-11-13 RX ADMIN — Medication 10 ML: at 04:52

## 2018-11-13 RX ADMIN — Medication 10 ML: at 22:51

## 2018-11-13 RX ADMIN — ENOXAPARIN SODIUM 40 MG: 40 INJECTION, SOLUTION INTRAVENOUS; SUBCUTANEOUS at 22:51

## 2018-11-13 NOTE — PROGRESS NOTES
Problem: Falls - Risk of 
Goal: *Absence of Falls Document Valencia Cornea Fall Risk and appropriate interventions in the flowsheet. Outcome: Progressing Towards Goal 
Fall Risk Interventions:

## 2018-11-13 NOTE — PROGRESS NOTES
PLAN: 
Prophylaxis with Lovenox, Protonix, SCDs, IS. Dilaudid PCA. Educated pt on use for better pain control. LR @ 125ml/hr Labs in AM 
NPO Await return of bowel function Post splenectomy vaccines ASSESSMENT:  Admit Date: 11/12/2018 1 Day Post-Op  Procedure(s): OPEN SPLENECTOMY/PARTIAL GASTRECTOMY/PANCREATIC CORE BIOPSY/ GASTROJEJUNOSTOMY 
LAPAROSCOPY GENERAL DIAGNOSTIC 
LAPAROTOMY EXPLORATORY Active Problems: 
  Mass of pancreas (11/12/2018) HPI: This is a 53-year-old female who presented with recent abdominal pain, CT scan was performed. A large pancreatic mass was identified in the body and the tail with some \"inflammatory changes\" around the pancreas. Her film revealed highly suspicious for pancreatic cancer. A CT scan of the chest shows no evidence of metastatic disease and the major blood vessels around the pancreas appeared to be uninvolved. A surgical exploration is warranted for resection. She understood the resectability can only be decided intraoperatively and she understood this is major surgery with potential risks and she wanted to proceed. 
  
FINDINGS: 
1. Laparoscopy shows no peritoneal disease. 2.  However, a large pancreatic mass is identified in the body and the tail of the pancreas with invasion of the stomach wall and invasion through the ligament of Treitz into the duodenum around the root of the mesentery and at this point, I believe this is unresectable. SUBJECTIVE: 
Pt c/o pain this AM. She has been hesitant to use her PCA. -N/V. - flatus. OBJECTIVE: 
WBC 25, H/H 11/34.5. K+ 4.4. Cr 0.73. NGT with 475ml/24h bilious drainage. Brennan with blood tinged urine. LUQ drain with 140ml/24h serosanguinous output. 350ml/24h UOP recorded. AF, VSS. On 2L NC. Constitutional: Alert oriented cooperative patient in no acute distress; appears stated age Visit Vitals /77 (BP 1 Location: Right arm, BP Patient Position: At rest) Pulse 90 Temp 99 °F (37.2 °C) Resp 18 Ht 5' 2\" (1.575 m) Wt 219 lb 8 oz (99.6 kg) SpO2 94% BMI 40.15 kg/m² Eyes:Sclera are clear. ENMT: no external lesions gross hearing normal; no obvious neck masses, no ear or lip lesions. NGT secured in place with bilious output. CV: RRR. Normal perfusion Resp: No JVD. Breathing is  non-labored; no audible wheezing. CTAB. GI: soft and non-distended; appropriately tender. Dressing c/d/i. Hypoactive BS. LUQ drain with serosanguinous output. : cobos in place. Blood tinged urine. Musculoskeletal: unremarkable with normal function. No embolic signs or cyanosis. Neuro:  Oriented; moves all 4; no focal deficits Psychiatric: normal affect and mood, no memory impairment Patient Vitals for the past 24 hrs: 
 BP Temp Pulse Resp SpO2 Height Weight 11/13/18 0654 117/77 99 °F (37.2 °C) 90 18 94 %    
11/13/18 0300 125/69 98.2 °F (36.8 °C) 94 17 95 %    
11/12/18 2300 120/74 98 °F (36.7 °C) 88 16 96 %    
11/12/18 1751 132/62  85  98 %    
11/12/18 1746 128/65  85  98 %    
11/12/18 1741 127/66 98 °F (36.7 °C) 81 17 98 %    
11/12/18 1736 132/66  79 16 97 %    
11/12/18 1731 129/67  78 16 97 %    
11/12/18 1726 110/61  77 17 99 %    
11/12/18 1721 120/59  79 16 98 %    
11/12/18 1716 120/57  77 16 98 %    
11/12/18 1711 109/55  78 16 98 %    
11/12/18 1708 131/62 97.8 °F (36.6 °C) 78 16 98 %    
11/12/18 1243 137/67 97.4 °F (36.3 °C) 89 18 98 % 5' 2\" (1.575 m) 219 lb 8 oz (99.6 kg) Labs:   
Recent Labs 11/13/18 
0424 WBC 25.0* HGB 11.0*  
   
K 4.4  
 CO2 28 BUN 12  
CREA 0.73 * BENJY Kilgore

## 2018-11-13 NOTE — OP NOTES
Lety Castillo [address] 
OPERATIVE REPORT Flor Romero 
MR#: [id number] : 1960 ACCOUNT #: [de-identified] DATE OF SERVICE: 2018 SURGEON:  Familia Ferguson MD 
 
ASSISTANT:  Maia Plata NP 
 
PREOPERATIVE DIAGNOSIS:  Pancreatic cancer. POSTOPERATIVE DIAGNOSIS:  Locally advanced pancreatic cancer. PROCEDURE PERFORMED: 
1. Diagnostic laparoscopy. 2.  Laparotomy with partial gastrectomy. 3.  Pancreatic core needle biopsy. 4.  Gastrojejunostomy. 5.  Splenectomy. ANESTHESIA:  general 
 
SPECIMENS REMOVED:  As above IMPLANTS:  ERIN x 1 COMPLICATIONS:  none INDICATIONS:  This is a 61-year-old female who presented with recent abdominal pain, CT scan was performed. A large pancreatic mass was identified in the body and the tail with some \"inflammatory changes\" around the pancreas. Her film revealed highly suspicious for pancreatic cancer. A CT scan of the chest shows no evidence of metastatic disease and the major blood vessels around the pancreas appeared to be uninvolved. A surgical exploration is warranted for resection. She understood the resectability can only be decided intraoperatively and she understood this is major surgery with potential risks and she wanted to proceed. FINDINGS: 
1. Laparoscopy shows no peritoneal disease. 2.  However, a large pancreatic mass is identified in the body and the tail of the pancreas with invasion of the stomach wall and invasion through the ligament of Treitz into the duodenum around the root of the mesentery and at this point, I believe this is unresectable. DESCRIPTION OF PROCEDURE:  After informed consent obtained, the patient was brought to the operating room, laid in supine position. General anesthesia was administered and a single port was placed at the epigastrium. The peritoneal cavity was entered and the entire peritoneal cavity was examined.   I see no evidence of peritoneal disease and decision was made to proceed with laparotomy. The upper midline laparotomy incision was made and she has some omental adhesion. This was taken down and falciform ligament was taken down and then I was able to place the Bookwalter retractor. The upper abdomen was exposed adequately and first of all we opened the gastrocolic ligament and the lesser sac was entered and at this point, a large pancreatic mass was immediately palpated and I continued the dissection along the greater curvature,  the omentum and greater curvature were divided with the Harmonic device and this was further fully exposed anterior surface of the pancreas and the pancreatic mass was confirmed. The pancreas does invade into the posterior wall of the stomach and this will limit further exposure, so it was decided to isolate the stomach and the lesser curvature was dissected both distal and proximal to the invasion portion and then an Endo-LORENZO stapler was brought in and the stomach was removed on its invaded part and then the pancreas was able to better expose and actually able to identify the healthy part of the pancreas just in front of the neck of the pancreas and the superior pancreatic lymph node was identified. This was removed and then the common hepatic artery was exposed and I am pretty pleased at this exposure and that the superior portion of the pancreas was identified and then continued dissection through the inferior portion of the pancreas and the pancreatic mass did appear to be invaded through the ligament of Treitz and I dissected the root of the mesentery and to get through the root of the transverse mesentery and at this point, I clearly see that the cancer invade into the root of the mesentery and I was able to dissect the middle colic vein that clearly goes into the superior mesenteric vein and the pancreatic cancer was right in front of this blood vessel.   Although the pancreatic neck appeared to be clear; however, the pancreatic cancer invades through the ligament of Treitz and directly invaded into the third and the fourth portion of the duodenum and the mesentery was reconfirmed to be surrounded at least on the left side by the pancreatic mass. At this point, I felt the resection is not possible, especially the pancreatic cancer did invade outside the pancreas and into the surrounding organs, also on the surface of the peritoneum at the ligament of Treitz, so I felt even a resection can be forced and that will not improve the patient's survival but increase the complications, so at this time I decided to forego the resection. The core needle biopsy was performed at the tail of the pancreas. That is the primary cancer site for tissue confirmation. Then I detached the portion of the stomach on the pancreas. This stomach was cauterized and resected and then a further exploration revealed profuse bleeding at the spleen and there were multiple capsular tears and I felt splenectomy is necessary to stop bleeding. The Endo-LORENZO stapler was brought in. As noted, we actually completely divided the short gastric arteries and then mobilized the splenic flexure of the colon so with the Endo-LORENZO stapler the hilum was divided with multiple firings. The spleen was removed and then the bleeding was completely stopped. The surgical field was copiously irrigated with sterile water and a #19  ERIN in the left upper quadrant. At this point, we felt gastrojejunostomy is beneficial given the cancer eroding into the fourth portion of duodenum, future obstruction is expected, so then I brought up a loop of jejunum to the stomach. This was brought up antecolic and a side-to-side anastomosis was performed between the stomach and the jejunum with the LORENZO TA stapler.   The anastomosis was wide open and NG was repositioned just proximal to the anastomosis. The surgical field was reinspected, no evidence of bleeding at the end of the case and the transverse colon and the splenic flexure was also inspected and there was no evidence of ischemia, although we mobilized the part of the root of the transverse mesentery. The midline fascia was closed with #1 looped PDS in a running fashion. Skin closed with staples. The patient is stable during the entire procedure and transferred to recovery room in stable condition. All instrument counts and lap counts were correct. ESTIMATED BLOOD LOSS:  About a liter, this is mainly from the spleen. Consuelo Krueger was present and assisted during the entire case. Jeni Peraza MD 
  
 
BY / HN 
D: 11/12/2018 21:47    
T: 11/13/2018 07:27 JOB #: [id number]

## 2018-11-13 NOTE — PROGRESS NOTES
END OF SHIFT NOTE: 
 
INTAKE/OUTPUT 
11/12 0701 - 11/13 0700 In: 2253 [I.V.:3101] Out: 1965 [Urine:350; Drains:140] Voiding: NO 
Catheter: YES Drain:  
Arvind-Bradley Drain 11/12/18 Left Abdomen (Active) Site Assessment Clean, dry, & intact 11/13/2018  8:39 AM  
Dressing Status Clean, dry, & intact 11/13/2018  8:39 AM  
Status Draining 11/13/2018  8:39 AM  
Drainage Color Sanguinous 11/13/2018  8:39 AM  
Output (ml) 15 ml 11/13/2018  6:05 PM  
   
Nasogastric Tube 11/12/18 (Active) Site Assessment Clean, dry, & intact 11/13/2018  8:39 AM  
Dressing Status Clean, dry, & intact 11/13/2018  8:39 AM  
G Port Status Intermittent Suction 11/13/2018  8:39 AM  
External Insertion Mohinder (cms) 60 cms 11/13/2018  8:39 AM  
Drainage Description Ivis Peck 11/13/2018  8:39 AM  
Drainage Chamber Level (ml) 840 ml 11/13/2018  3:07 PM  
Output (ml) 440 ml 11/13/2018  3:07 PM  
 
 
 
 
 
 
Flatus: Patient does not have flatus present. Stool:  0 occurrences. Characteristics: 
  
Emesis: 0 occurrences. Characteristics: VITAL SIGNS Patient Vitals for the past 12 hrs: 
 Temp Pulse Resp BP SpO2  
11/13/18 1458 97.9 °F (36.6 °C) (!) 103 17 130/77 91 % 11/13/18 1212     92 % 11/13/18 1054 98 °F (36.7 °C) 96 17 107/70 91 % 11/13/18 0654 99 °F (37.2 °C) 90 18 117/77 94 % Pain Assessment Pain Intensity 1: 4 (11/13/18 0839) Pain Location 1: Abdomen Pain Intervention(s) 1: Encouraged PCA Patient Stated Pain Goal: 0 Ambulating Yes, short distances bed to chair and viceversa. Shift report given to oncoming nurse at the bedside.  
 
Tonya Altamirano RN

## 2018-11-13 NOTE — PROGRESS NOTES
11/12/18 1910 Dual Skin Pressure Injury Assessment Dual Skin Pressure Injury Assessment [facility] Second Care Provider (Based on [address] [place] [address]) Elise Bhatia RN Mid abdomen incision, ERIN to left abdomen

## 2018-11-13 NOTE — PROGRESS NOTES
Patient still with low urine output despite bolus of NS 500mls given this am. Urine is pink tinged. Patient is up in chair. Family at bedside. BENJY Villafuerte notified.

## 2018-11-13 NOTE — PROGRESS NOTES
Problem: Pain Goal: *Control of Pain Outcome: Progressing Towards Goal 
Instructed patient on use of PCA pump to stay at comfortable levels below 5 in the scale of 1-10. Verbalized understanding.

## 2018-11-14 LAB
ANION GAP SERPL CALC-SCNC: 7 MMOL/L (ref 7–16)
BASOPHILS # BLD: 0.1 K/UL (ref 0–0.2)
BASOPHILS NFR BLD: 0 % (ref 0–2)
BUN SERPL-MCNC: 9 MG/DL (ref 6–23)
CALCIUM SERPL-MCNC: 8.3 MG/DL (ref 8.3–10.4)
CHLORIDE SERPL-SCNC: 105 MMOL/L (ref 98–107)
CO2 SERPL-SCNC: 29 MMOL/L (ref 21–32)
CREAT SERPL-MCNC: 0.51 MG/DL (ref 0.6–1)
DIFFERENTIAL METHOD BLD: ABNORMAL
EOSINOPHIL # BLD: 1.2 K/UL (ref 0–0.8)
EOSINOPHIL NFR BLD: 4 % (ref 0.5–7.8)
ERYTHROCYTE [DISTWIDTH] IN BLOOD BY AUTOMATED COUNT: 15 %
GLUCOSE SERPL-MCNC: 104 MG/DL (ref 65–100)
HCT VFR BLD AUTO: 27.6 % (ref 35.8–46.3)
HGB BLD-MCNC: 8.9 G/DL (ref 11.7–15.4)
IMM GRANULOCYTES # BLD: 0.3 K/UL (ref 0–0.5)
IMM GRANULOCYTES NFR BLD AUTO: 1 % (ref 0–5)
LYMPHOCYTES # BLD: 1.2 K/UL (ref 0.5–4.6)
LYMPHOCYTES NFR BLD: 4 % (ref 13–44)
MAGNESIUM SERPL-MCNC: 2.2 MG/DL (ref 1.8–2.4)
MCH RBC QN AUTO: 32.7 PG (ref 26.1–32.9)
MCHC RBC AUTO-ENTMCNC: 32.2 G/DL (ref 31.4–35)
MCV RBC AUTO: 101.5 FL (ref 79.6–97.8)
MONOCYTES # BLD: 2.2 K/UL (ref 0.1–1.3)
MONOCYTES NFR BLD: 8 % (ref 4–12)
NEUTS SEG # BLD: 22.1 K/UL (ref 1.7–8.2)
NEUTS SEG NFR BLD: 82 % (ref 43–78)
NRBC # BLD: 0 K/UL (ref 0–0.2)
PHOSPHATE SERPL-MCNC: 2.6 MG/DL (ref 2.5–4.5)
PLATELET # BLD AUTO: 397 K/UL (ref 150–450)
PMV BLD AUTO: 10.4 FL (ref 9.4–12.3)
POTASSIUM SERPL-SCNC: 3.7 MMOL/L (ref 3.5–5.1)
RBC # BLD AUTO: 2.72 M/UL (ref 4.05–5.2)
SODIUM SERPL-SCNC: 141 MMOL/L (ref 136–145)
WBC # BLD AUTO: 27.1 K/UL (ref 4.3–11.1)

## 2018-11-14 PROCEDURE — 74011250636 HC RX REV CODE- 250/636: Performed by: SURGERY

## 2018-11-14 PROCEDURE — C9113 INJ PANTOPRAZOLE SODIUM, VIA: HCPCS | Performed by: SURGERY

## 2018-11-14 PROCEDURE — 65270000029 HC RM PRIVATE

## 2018-11-14 PROCEDURE — 74011250636 HC RX REV CODE- 250/636: Performed by: PHYSICIAN ASSISTANT

## 2018-11-14 PROCEDURE — 36415 COLL VENOUS BLD VENIPUNCTURE: CPT

## 2018-11-14 PROCEDURE — 85025 COMPLETE CBC W/AUTO DIFF WBC: CPT

## 2018-11-14 PROCEDURE — 77030020255 HC SOL INJ LR 1000ML BG

## 2018-11-14 PROCEDURE — 77010033678 HC OXYGEN DAILY

## 2018-11-14 PROCEDURE — 84100 ASSAY OF PHOSPHORUS: CPT

## 2018-11-14 PROCEDURE — 83735 ASSAY OF MAGNESIUM: CPT

## 2018-11-14 PROCEDURE — 74011000250 HC RX REV CODE- 250: Performed by: SURGERY

## 2018-11-14 PROCEDURE — 80048 BASIC METABOLIC PNL TOTAL CA: CPT

## 2018-11-14 RX ADMIN — Medication 10 ML: at 22:25

## 2018-11-14 RX ADMIN — Medication 10 ML: at 05:16

## 2018-11-14 RX ADMIN — LORAZEPAM 1 MG: 2 INJECTION INTRAMUSCULAR; INTRAVENOUS at 12:41

## 2018-11-14 RX ADMIN — LORAZEPAM 1 MG: 2 INJECTION INTRAMUSCULAR; INTRAVENOUS at 01:16

## 2018-11-14 RX ADMIN — Medication 10 ML: at 15:13

## 2018-11-14 RX ADMIN — SODIUM CHLORIDE, SODIUM LACTATE, POTASSIUM CHLORIDE, AND CALCIUM CHLORIDE 125 ML/HR: 600; 310; 30; 20 INJECTION, SOLUTION INTRAVENOUS at 15:12

## 2018-11-14 RX ADMIN — ENOXAPARIN SODIUM 40 MG: 40 INJECTION, SOLUTION INTRAVENOUS; SUBCUTANEOUS at 22:25

## 2018-11-14 RX ADMIN — SODIUM CHLORIDE, SODIUM LACTATE, POTASSIUM CHLORIDE, AND CALCIUM CHLORIDE 125 ML/HR: 600; 310; 30; 20 INJECTION, SOLUTION INTRAVENOUS at 23:45

## 2018-11-14 RX ADMIN — SODIUM CHLORIDE 40 MG: 9 INJECTION, SOLUTION INTRAMUSCULAR; INTRAVENOUS; SUBCUTANEOUS at 22:23

## 2018-11-14 RX ADMIN — SODIUM CHLORIDE 40 MG: 9 INJECTION, SOLUTION INTRAMUSCULAR; INTRAVENOUS; SUBCUTANEOUS at 08:23

## 2018-11-14 NOTE — PROGRESS NOTES
END OF SHIFT NOTE: 
 
INTAKE/OUTPUT 
11/13 0701 - 11/14 0700 In: 3103 [I.V.:3103] Out: 1660 [Urine:700; Drains:45] Voiding: YES Catheter: YES Drain:  
Arvind-Bradley Drain 11/12/18 Left Abdomen (Active) Site Assessment Clean, dry, & intact 11/13/2018  8:39 AM  
Dressing Status Clean, dry, & intact 11/13/2018  8:39 AM  
Status Draining 11/13/2018  8:39 AM  
Drainage Color Sanguinous 11/13/2018  8:39 AM  
Output (ml) 15 ml 11/13/2018  6:05 PM  
 
 
 
 
 
 
Flatus: Patient does have flatus present. Stool:  0 occurrences. Characteristics: 
  
Emesis: 0 occurrences. Characteristics: VITAL SIGNS Patient Vitals for the past 12 hrs: 
 Temp Pulse Resp BP SpO2  
11/14/18 0300 98.9 °F (37.2 °C) (!) 106 18 129/75 94 % Pain Assessment Pain Intensity 1: 0 (11/13/18 1948) Pain Location 1: Abdomen Pain Intervention(s) 1: Encouraged PCA Patient Stated Pain Goal: 0 Ambulating No 
 
Shift report given to oncoming nurse at the bedside. Loulou Pettit

## 2018-11-14 NOTE — PROGRESS NOTES
11/14/2018 PLAN: 
Diet- NPO for at least 3 days post op- NGT came out last evening DVT Prophylactics- Lovenox/scd Pain control-Dilaudid PCA, Ativan for anxiety SUP prophylaxis- Protonix Check mag and phos today Continue cobos to monitor U/O 
 
 
ASSESSMENT:  Admit Date: 11/12/2018 2 Days Post-Op  Procedure(s): OPEN SPLENECTOMY/PARTIAL GASTRECTOMY/PANCREATIC CORE BIOPSY/ GASTROJEJUNOSTOMY 
LAPAROSCOPY GENERAL DIAGNOSTIC 
LAPAROTOMY EXPLORATORY SUBJECTIVE: Complains of being a little anxious, encouraged ativan PRN, denies nausea or vomiting, denies flatus or BM. Pain controlled with PCA OBJECTIVE:AF, 's, Abdomen large, soft, non distended. Staples without redness or drainage. ERIN with 45ml/24 hours serosanguineous drainage. U/O 700ml/24 hours and NG prior to coming out 915 ml/24 hours. O2 2 l NC sats 92% Visit Vitals /79 Pulse (!) 111 Temp 98.5 °F (36.9 °C) Resp 18 Ht 5' 2\" (1.575 m) Wt 219 lb 8 oz (99.6 kg) SpO2 93% BMI 40.15 kg/m² Intake/Output Summary (Last 24 hours) at 11/14/2018 0745 Last data filed at 11/14/2018 4076 Gross per 24 hour Intake 3103 ml Output 1660 ml Net 1443 ml Labs:   
Recent Labs 11/14/18 
6905 WBC 27.1* HGB 8.9*  
   
K 3.7  CO2 29 BUN 9  
CREA 0.51* * Sherrie Krueger NP

## 2018-11-14 NOTE — PROGRESS NOTES
Problem: Pain Goal: *Control of Pain Outcome: Progressing Towards Goal 
Patient on PCA pump. Has reported pain levels of 2-4 on a 0-10 scale.

## 2018-11-14 NOTE — PROGRESS NOTES
Shift assessment complete via doc flow sheet. A+OX4. RR even and unlabored. HR regular. Bed LL. All needs met at this time. Staff will monitor with hourly rounds.

## 2018-11-15 LAB
ANION GAP SERPL CALC-SCNC: 8 MMOL/L (ref 7–16)
BASOPHILS # BLD: 0.1 K/UL (ref 0–0.2)
BASOPHILS NFR BLD: 0 % (ref 0–2)
BUN SERPL-MCNC: 6 MG/DL (ref 6–23)
CALCIUM SERPL-MCNC: 8.1 MG/DL (ref 8.3–10.4)
CHLORIDE SERPL-SCNC: 100 MMOL/L (ref 98–107)
CO2 SERPL-SCNC: 27 MMOL/L (ref 21–32)
CREAT SERPL-MCNC: 0.47 MG/DL (ref 0.6–1)
DIFFERENTIAL METHOD BLD: ABNORMAL
EOSINOPHIL # BLD: 1.8 K/UL (ref 0–0.8)
EOSINOPHIL NFR BLD: 7 % (ref 0.5–7.8)
ERYTHROCYTE [DISTWIDTH] IN BLOOD BY AUTOMATED COUNT: 14.6 %
GLUCOSE SERPL-MCNC: 96 MG/DL (ref 65–100)
HCT VFR BLD AUTO: 26.5 % (ref 35.8–46.3)
HGB BLD-MCNC: 8.5 G/DL (ref 11.7–15.4)
IMM GRANULOCYTES # BLD: 0.3 K/UL (ref 0–0.5)
IMM GRANULOCYTES NFR BLD AUTO: 1 % (ref 0–5)
LYMPHOCYTES # BLD: 1.4 K/UL (ref 0.5–4.6)
LYMPHOCYTES NFR BLD: 5 % (ref 13–44)
MAGNESIUM SERPL-MCNC: 2 MG/DL (ref 1.8–2.4)
MCH RBC QN AUTO: 32.2 PG (ref 26.1–32.9)
MCHC RBC AUTO-ENTMCNC: 32.1 G/DL (ref 31.4–35)
MCV RBC AUTO: 100.4 FL (ref 79.6–97.8)
MONOCYTES # BLD: 1.8 K/UL (ref 0.1–1.3)
MONOCYTES NFR BLD: 7 % (ref 4–12)
NEUTS SEG # BLD: 21.7 K/UL (ref 1.7–8.2)
NEUTS SEG NFR BLD: 80 % (ref 43–78)
NRBC # BLD: 0 K/UL (ref 0–0.2)
PHOSPHATE SERPL-MCNC: 2.8 MG/DL (ref 2.5–4.5)
PLATELET # BLD AUTO: 462 K/UL (ref 150–450)
PMV BLD AUTO: 10.1 FL (ref 9.4–12.3)
POTASSIUM SERPL-SCNC: 3.3 MMOL/L (ref 3.5–5.1)
RBC # BLD AUTO: 2.64 M/UL (ref 4.05–5.2)
SODIUM SERPL-SCNC: 135 MMOL/L (ref 136–145)
WBC # BLD AUTO: 27 K/UL (ref 4.3–11.1)

## 2018-11-15 PROCEDURE — 74011000250 HC RX REV CODE- 250: Performed by: SURGERY

## 2018-11-15 PROCEDURE — 36415 COLL VENOUS BLD VENIPUNCTURE: CPT

## 2018-11-15 PROCEDURE — 65270000029 HC RM PRIVATE

## 2018-11-15 PROCEDURE — 80048 BASIC METABOLIC PNL TOTAL CA: CPT

## 2018-11-15 PROCEDURE — 84100 ASSAY OF PHOSPHORUS: CPT

## 2018-11-15 PROCEDURE — 74011250636 HC RX REV CODE- 250/636: Performed by: SURGERY

## 2018-11-15 PROCEDURE — 77010033678 HC OXYGEN DAILY

## 2018-11-15 PROCEDURE — C9113 INJ PANTOPRAZOLE SODIUM, VIA: HCPCS | Performed by: SURGERY

## 2018-11-15 PROCEDURE — 83735 ASSAY OF MAGNESIUM: CPT

## 2018-11-15 PROCEDURE — 74011250636 HC RX REV CODE- 250/636: Performed by: PHYSICIAN ASSISTANT

## 2018-11-15 PROCEDURE — 85025 COMPLETE CBC W/AUTO DIFF WBC: CPT

## 2018-11-15 PROCEDURE — 77030020255 HC SOL INJ LR 1000ML BG

## 2018-11-15 RX ORDER — POTASSIUM CHLORIDE 14.9 MG/ML
20 INJECTION INTRAVENOUS
Status: COMPLETED | OUTPATIENT
Start: 2018-11-15 | End: 2018-11-15

## 2018-11-15 RX ADMIN — Medication 10 ML: at 06:07

## 2018-11-15 RX ADMIN — POTASSIUM CHLORIDE 20 MEQ: 200 INJECTION, SOLUTION INTRAVENOUS at 08:30

## 2018-11-15 RX ADMIN — POTASSIUM CHLORIDE 20 MEQ: 200 INJECTION, SOLUTION INTRAVENOUS at 11:54

## 2018-11-15 RX ADMIN — SODIUM CHLORIDE 40 MG: 9 INJECTION, SOLUTION INTRAMUSCULAR; INTRAVENOUS; SUBCUTANEOUS at 08:31

## 2018-11-15 RX ADMIN — SODIUM CHLORIDE, SODIUM LACTATE, POTASSIUM CHLORIDE, AND CALCIUM CHLORIDE 125 ML/HR: 600; 310; 30; 20 INJECTION, SOLUTION INTRAVENOUS at 07:06

## 2018-11-15 RX ADMIN — Medication 10 ML: at 21:55

## 2018-11-15 RX ADMIN — HYDROMORPHONE HYDROCHLORIDE: 2 INJECTION INTRAMUSCULAR; INTRAVENOUS; SUBCUTANEOUS at 19:03

## 2018-11-15 RX ADMIN — ENOXAPARIN SODIUM 40 MG: 40 INJECTION, SOLUTION INTRAVENOUS; SUBCUTANEOUS at 21:52

## 2018-11-15 RX ADMIN — Medication 10 ML: at 21:53

## 2018-11-15 RX ADMIN — LORAZEPAM 1 MG: 2 INJECTION INTRAMUSCULAR; INTRAVENOUS at 16:12

## 2018-11-15 RX ADMIN — SODIUM CHLORIDE, SODIUM LACTATE, POTASSIUM CHLORIDE, AND CALCIUM CHLORIDE 75 ML/HR: 600; 310; 30; 20 INJECTION, SOLUTION INTRAVENOUS at 19:06

## 2018-11-15 RX ADMIN — SODIUM CHLORIDE 40 MG: 9 INJECTION, SOLUTION INTRAMUSCULAR; INTRAVENOUS; SUBCUTANEOUS at 21:52

## 2018-11-15 NOTE — PROGRESS NOTES
END OF SHIFT NOTE: 
 
INTAKE/OUTPUT 
11/14 0701 - 11/15 0700 In: 2509 [I.V.:2509] Out: 0584 [COAPN:8608; Drains:85] Voiding: YES Catheter: YES Drain:  
Arvind-Bradley Drain 11/12/18 Left Abdomen (Active) Site Assessment Clean, dry, & intact 11/14/2018 11:45 PM  
Dressing Status Clean, dry, & intact 11/14/2018 11:45 PM  
Status Patent; Charged 11/14/2018 11:45 PM  
Drainage Color Serosanguinous 11/14/2018 11:54 PM  
Output (ml) 15 ml 11/14/2018 11:54 PM  
 
 
 
 
 
 
Flatus: Patient does not have flatus present. Stool:  0 occurrences. Characteristics: 
  
Emesis: 0 occurrences. Characteristics: VITAL SIGNS Patient Vitals for the past 12 hrs: 
 Temp Pulse Resp BP SpO2  
11/15/18 0410 98.4 °F (36.9 °C) 95 16 155/80 92 % 11/14/18 2353 97.9 °F (36.6 °C) 100 19 114/70 94 % 11/14/18 2013 98.5 °F (36.9 °C) (!) 103 20 133/77 95 % Pain Assessment Pain Intensity 1: 4 (11/15/18 0411) Pain Location 1: Abdomen Pain Intervention(s) 1: Encouraged PCA Patient Stated Pain Goal: 2 Ambulating Yes to BR Shift report given to oncoming nurse at the bedside.  
 
Aniceto Oquendo RN

## 2018-11-15 NOTE — PROGRESS NOTES
Spoke briefly to Ms. Heidy S [facility] of room 211 about Case Management and discharge planning. Her plan is home with her , no additional discharge needs identified. However, will follow and assist as needed. Care Management Interventions Plan discussed with Pt/Family/Caregiver:  Yes

## 2018-11-15 NOTE — PROGRESS NOTES
END OF SHIFT NOTE: 
 
INTAKE/OUTPUT 
11/13 0701 - 11/14 0700 In: 3103 [I.V.:3103] Out: 1660 [Urine:700; Drains:45] Voiding: NO 
Catheter: YES Drain:  
Arvind-Bradley Drain 11/12/18 Left Abdomen (Active) Site Assessment Clean, dry, & intact 11/14/2018 10:37 AM  
Dressing Status Clean, dry, & intact 11/14/2018 10:37 AM  
Status Draining 11/14/2018 10:37 AM  
Drainage Color Sanguinous 11/14/2018 10:37 AM  
Output (ml) 30 ml 11/14/2018 12:06 PM  
 
 
 
 
 
 
Flatus: Patient does not have flatus present. Stool:  0 occurrences. Characteristics: 
  
Emesis: 0 occurrences. Characteristics: VITAL SIGNS Patient Vitals for the past 12 hrs: 
 Temp Pulse Resp BP SpO2  
11/14/18 1516 97.6 °F (36.4 °C) (!) 102 18 131/72 95 % 11/14/18 1052 99.3 °F (37.4 °C) (!) 104 18 147/79 91 % Pain Assessment Pain Intensity 1: 5 (11/14/18 1912) Pain Location 1: Abdomen Pain Intervention(s) 1: Encouraged PCA Patient Stated Pain Goal: 2 Ambulating Yes Shift report given to oncoming nurse at the bedside.  
 
Remington Doherty RN

## 2018-11-15 NOTE — PROGRESS NOTES
END OF SHIFT NOTE: 
 
INTAKE/OUTPUT 
11/14 0701 - 11/15 0700 In: 2509 [I.V.:2509] Out: 1539 [BIBHC:3800; Drains:85] Voiding: YES Catheter: NO 
Drain:  
Arvind-Bradley Drain 11/12/18 Left Abdomen (Active) Site Assessment Clean, dry, & intact 11/15/2018  2:31 PM  
Dressing Status Clean, dry, & intact 11/15/2018  2:31 PM  
Status Charged;Draining 11/15/2018  2:31 PM  
Drainage Color Serosanguinous 11/15/2018  2:31 PM  
Output (ml) 15 ml 11/14/2018 11:54 PM  
 
 
 
 
 
 
Flatus: Patient does not have flatus present. Stool:  0 occurrences. Characteristics: 
  
Emesis: 0 occurrences. Characteristics: VITAL SIGNS Patient Vitals for the past 12 hrs: 
 Temp Pulse Resp BP SpO2  
11/15/18 1554 97.7 °F (36.5 °C) (!) 110 18 119/75 90 % 11/15/18 1100 98.7 °F (37.1 °C) 99 16 137/80 96 % 11/15/18 0800 99.8 °F (37.7 °C) 98 16 109/71 95 % Pain Assessment Pain Intensity 1: 0 (11/15/18 1431) Pain Location 1: Abdomen Pain Intervention(s) 1: Encouraged PCA Patient Stated Pain Goal: 2 Ambulating Yes, patient sat on chair for most of day. Ambulated twice in the halls. Patient states, she plans to ambulated this evening. Pt. used her IS throughout shift. Shift report given to oncoming nurse at the bedside.  
 
Claudetta Pole, RN

## 2018-11-15 NOTE — PROGRESS NOTES
11/15/2018 PLAN: 
Diet- NPO  
DVT Prophylactics- Lovenox/scd Pain control-Dilaudid PCA, Ativan for anxiety SUP prophylaxis- Protonix Replace K today MAGO cobos ASSESSMENT:  Admit Date: 11/12/2018 3 Days Post-Op  Procedure(s): OPEN SPLENECTOMY/PARTIAL GASTRECTOMY/PANCREATIC CORE BIOPSY/ GASTROJEJUNOSTOMY 
LAPAROSCOPY GENERAL DIAGNOSTIC 
LAPAROTOMY EXPLORATORY SUBJECTIVE: No complaints, denies nausea or vomiting, denies flatus or BM. Pain controlled with PCA OBJECTIVE:AF, 's, Abdomen large, soft, non distended. Staples without redness or drainage. ERIN with 85ml/24 hours serosanguineous drainage. U/O 1650ml/24 hours. Visit Vitals /80 Pulse 99 Temp 98.7 °F (37.1 °C) Resp 16 Ht 5' 2\" (1.575 m) Wt 219 lb 8 oz (99.6 kg) SpO2 96% BMI 40.15 kg/m² Intake/Output Summary (Last 24 hours) at 11/15/2018 1404 Last data filed at 11/15/2018 1157 Gross per 24 hour Intake 1214 ml Output 1835 ml Net -621 ml Labs:   
Recent Labs 11/15/18 
7259 WBC 27.0* HGB 8.5* * * K 3.3*  
 CO2 27 BUN 6  
CREA 0.47* GLU 96  
 
 
 
Crystal Amador Betancourt NP

## 2018-11-16 LAB
ABO + RH BLD: NORMAL
ANION GAP SERPL CALC-SCNC: 11 MMOL/L (ref 7–16)
BASOPHILS # BLD: 0.1 K/UL (ref 0–0.2)
BASOPHILS NFR BLD: 0 % (ref 0–2)
BLD PROD TYP BPU: NORMAL
BLD PROD TYP BPU: NORMAL
BLOOD GROUP ANTIBODIES SERPL: NORMAL
BPU ID: NORMAL
BPU ID: NORMAL
BUN SERPL-MCNC: 5 MG/DL (ref 6–23)
CALCIUM SERPL-MCNC: 8.2 MG/DL (ref 8.3–10.4)
CHLORIDE SERPL-SCNC: 99 MMOL/L (ref 98–107)
CO2 SERPL-SCNC: 24 MMOL/L (ref 21–32)
CREAT SERPL-MCNC: 0.34 MG/DL (ref 0.6–1)
CROSSMATCH RESULT,%XM: NORMAL
CROSSMATCH RESULT,%XM: NORMAL
DIFFERENTIAL METHOD BLD: ABNORMAL
EOSINOPHIL # BLD: 1.9 K/UL (ref 0–0.8)
EOSINOPHIL NFR BLD: 8 % (ref 0.5–7.8)
ERYTHROCYTE [DISTWIDTH] IN BLOOD BY AUTOMATED COUNT: 14.3 %
GLUCOSE SERPL-MCNC: 87 MG/DL (ref 65–100)
HCT VFR BLD AUTO: 24.4 % (ref 35.8–46.3)
HGB BLD-MCNC: 7.9 G/DL (ref 11.7–15.4)
IMM GRANULOCYTES # BLD: 0.2 K/UL (ref 0–0.5)
IMM GRANULOCYTES NFR BLD AUTO: 1 % (ref 0–5)
LYMPHOCYTES # BLD: 1.5 K/UL (ref 0.5–4.6)
LYMPHOCYTES NFR BLD: 6 % (ref 13–44)
MAGNESIUM SERPL-MCNC: 2.1 MG/DL (ref 1.8–2.4)
MCH RBC QN AUTO: 31.9 PG (ref 26.1–32.9)
MCHC RBC AUTO-ENTMCNC: 32.4 G/DL (ref 31.4–35)
MCV RBC AUTO: 98.4 FL (ref 79.6–97.8)
MONOCYTES # BLD: 1.6 K/UL (ref 0.1–1.3)
MONOCYTES NFR BLD: 7 % (ref 4–12)
NEUTS SEG # BLD: 17.7 K/UL (ref 1.7–8.2)
NEUTS SEG NFR BLD: 77 % (ref 43–78)
NRBC # BLD: 0 K/UL (ref 0–0.2)
PHOSPHATE SERPL-MCNC: 3.4 MG/DL (ref 2.5–4.5)
PLATELET # BLD AUTO: 573 K/UL (ref 150–450)
PMV BLD AUTO: 10.2 FL (ref 9.4–12.3)
POTASSIUM SERPL-SCNC: 3.3 MMOL/L (ref 3.5–5.1)
RBC # BLD AUTO: 2.48 M/UL (ref 4.05–5.2)
SODIUM SERPL-SCNC: 134 MMOL/L (ref 136–145)
SPECIMEN EXP DATE BLD: NORMAL
STATUS OF UNIT,%ST: NORMAL
STATUS OF UNIT,%ST: NORMAL
UNIT DIVISION, %UDIV: 0
UNIT DIVISION, %UDIV: 0
WBC # BLD AUTO: 23 K/UL (ref 4.3–11.1)

## 2018-11-16 PROCEDURE — 36415 COLL VENOUS BLD VENIPUNCTURE: CPT

## 2018-11-16 PROCEDURE — 77030020253 HC SOL INJ D545NS .05 DEX .45 SAL

## 2018-11-16 PROCEDURE — 85025 COMPLETE CBC W/AUTO DIFF WBC: CPT

## 2018-11-16 PROCEDURE — 74011250636 HC RX REV CODE- 250/636: Performed by: NURSE PRACTITIONER

## 2018-11-16 PROCEDURE — 83735 ASSAY OF MAGNESIUM: CPT

## 2018-11-16 PROCEDURE — 84100 ASSAY OF PHOSPHORUS: CPT

## 2018-11-16 PROCEDURE — 74011000250 HC RX REV CODE- 250: Performed by: SURGERY

## 2018-11-16 PROCEDURE — 74011250636 HC RX REV CODE- 250/636: Performed by: SURGERY

## 2018-11-16 PROCEDURE — 80048 BASIC METABOLIC PNL TOTAL CA: CPT

## 2018-11-16 PROCEDURE — C9113 INJ PANTOPRAZOLE SODIUM, VIA: HCPCS | Performed by: SURGERY

## 2018-11-16 PROCEDURE — 65270000029 HC RM PRIVATE

## 2018-11-16 PROCEDURE — 74011000258 HC RX REV CODE- 258: Performed by: NURSE PRACTITIONER

## 2018-11-16 RX ORDER — POTASSIUM CHLORIDE 14.9 MG/ML
20 INJECTION INTRAVENOUS
Status: COMPLETED | OUTPATIENT
Start: 2018-11-16 | End: 2018-11-16

## 2018-11-16 RX ORDER — DEXTROSE MONOHYDRATE AND SODIUM CHLORIDE 5; .45 G/100ML; G/100ML
25 INJECTION, SOLUTION INTRAVENOUS CONTINUOUS
Status: DISCONTINUED | OUTPATIENT
Start: 2018-11-16 | End: 2018-11-18

## 2018-11-16 RX ADMIN — POTASSIUM CHLORIDE 20 MEQ: 200 INJECTION, SOLUTION INTRAVENOUS at 09:32

## 2018-11-16 RX ADMIN — SODIUM CHLORIDE 40 MG: 9 INJECTION, SOLUTION INTRAMUSCULAR; INTRAVENOUS; SUBCUTANEOUS at 09:32

## 2018-11-16 RX ADMIN — SODIUM CHLORIDE 40 MG: 9 INJECTION, SOLUTION INTRAMUSCULAR; INTRAVENOUS; SUBCUTANEOUS at 22:04

## 2018-11-16 RX ADMIN — POTASSIUM CHLORIDE 20 MEQ: 200 INJECTION, SOLUTION INTRAVENOUS at 11:46

## 2018-11-16 RX ADMIN — Medication 10 ML: at 14:28

## 2018-11-16 RX ADMIN — ENOXAPARIN SODIUM 40 MG: 40 INJECTION, SOLUTION INTRAVENOUS; SUBCUTANEOUS at 22:04

## 2018-11-16 RX ADMIN — DEXTROSE MONOHYDRATE AND SODIUM CHLORIDE 75 ML/HR: 5; .45 INJECTION, SOLUTION INTRAVENOUS at 09:32

## 2018-11-16 RX ADMIN — HYDROMORPHONE HYDROCHLORIDE: 2 INJECTION INTRAMUSCULAR; INTRAVENOUS; SUBCUTANEOUS at 00:19

## 2018-11-16 NOTE — PROGRESS NOTES
Nutrition: 
Day 4 NPO/Clear liquid diet status identified as per standard of care monitoring Diet orders: 11-12: NPO, 11-16: Clear liquid Patient with no nutrition risk factors identified per admission malnutrition screening tool. NPO/Clear liquid diet status is r/t S/P surgery on 11-12 1. Diagnostic laparoscopy. 2.  Laparotomy with partial gastrectomy. 3.  Pancreatic core needle biopsy. 4.  Gastrojejunostomy. 5.  Splenectomy Trputi Copper It is reasonable to wait 7-10 days before initiating TPN in a patient with no or low nutrition risk. Expect po diet will be able to be started within this timeframe. Noted potentail for discharge home tomorrow, Will await start or progression of po diet as medical condition dictates. Otherwise, F/U will be per standard of care or by MD consult if prior. Niurka Marcum, [address], [address] [place] [address]

## 2018-11-16 NOTE — PROGRESS NOTES
11/16/2018 PLAN: 
Diet- clear liquids DVT Prophylactics- Lovenox/scd Pain control-Dilaudid PCA, Ativan for anxiety SUP prophylaxis- Protonix Will advance diet as tolerated and then switch to PO pain medication possible ready for discharge tomorrow. Replaced K 
DC ERIN drain ASSESSMENT:  Admit Date: 11/12/2018 4 Days Post-Op  Procedure(s): OPEN SPLENECTOMY/PARTIAL GASTRECTOMY/PANCREATIC CORE BIOPSY/ GASTROJEJUNOSTOMY 
LAPAROSCOPY GENERAL DIAGNOSTIC 
LAPAROTOMY EXPLORATORY SUBJECTIVE: No complaints, denies nausea or vomiting, + BM. Pain controlled with PCA OBJECTIVE:AF, 's, Abdomen large, soft, non distended. Staples without redness or drainage. ERIN with 15ml/24 hours serosanguineous drainage. U/O good. HGB trending down 7.9 will monitor asymptomatic. K3.3 replaced Visit Vitals /75 Pulse 93 Temp 98.2 °F (36.8 °C) Resp 18 Ht 5' 2\" (1.575 m) Wt 219 lb 8 oz (99.6 kg) SpO2 94% BMI 40.15 kg/m² Intake/Output Summary (Last 24 hours) at 11/16/2018 9340 Last data filed at 11/16/2018 0740 Gross per 24 hour Intake 2595 ml Output 1670 ml Net 925 ml Labs:   
Recent Labs 11/16/18 
1601 WBC 23.0* HGB 7.9*  
* * K 3.3*  
CL 99  
CO2 24 BUN 5*  
CREA 0.34*  [address], NP

## 2018-11-17 LAB
ANION GAP SERPL CALC-SCNC: 7 MMOL/L (ref 7–16)
BASOPHILS # BLD: 0.1 K/UL (ref 0–0.2)
BASOPHILS NFR BLD: 0 % (ref 0–2)
BUN SERPL-MCNC: 3 MG/DL (ref 6–23)
CALCIUM SERPL-MCNC: 7.9 MG/DL (ref 8.3–10.4)
CHLORIDE SERPL-SCNC: 98 MMOL/L (ref 98–107)
CO2 SERPL-SCNC: 29 MMOL/L (ref 21–32)
CREAT SERPL-MCNC: 0.46 MG/DL (ref 0.6–1)
DIFFERENTIAL METHOD BLD: ABNORMAL
EOSINOPHIL # BLD: 1.8 K/UL (ref 0–0.8)
EOSINOPHIL NFR BLD: 9 % (ref 0.5–7.8)
ERYTHROCYTE [DISTWIDTH] IN BLOOD BY AUTOMATED COUNT: 14.3 %
GLUCOSE SERPL-MCNC: 137 MG/DL (ref 65–100)
HCT VFR BLD AUTO: 24 % (ref 35.8–46.3)
HGB BLD-MCNC: 7.9 G/DL (ref 11.7–15.4)
IMM GRANULOCYTES # BLD: 0.2 K/UL (ref 0–0.5)
IMM GRANULOCYTES NFR BLD AUTO: 1 % (ref 0–5)
LYMPHOCYTES # BLD: 1.5 K/UL (ref 0.5–4.6)
LYMPHOCYTES NFR BLD: 8 % (ref 13–44)
MAGNESIUM SERPL-MCNC: 2.1 MG/DL (ref 1.8–2.4)
MCH RBC QN AUTO: 31.9 PG (ref 26.1–32.9)
MCHC RBC AUTO-ENTMCNC: 32.9 G/DL (ref 31.4–35)
MCV RBC AUTO: 96.8 FL (ref 79.6–97.8)
MONOCYTES # BLD: 1.6 K/UL (ref 0.1–1.3)
MONOCYTES NFR BLD: 8 % (ref 4–12)
NEUTS SEG # BLD: 14.7 K/UL (ref 1.7–8.2)
NEUTS SEG NFR BLD: 74 % (ref 43–78)
NRBC # BLD: 0 K/UL (ref 0–0.2)
PHOSPHATE SERPL-MCNC: 2.9 MG/DL (ref 2.5–4.5)
PLATELET # BLD AUTO: 685 K/UL (ref 150–450)
PMV BLD AUTO: 9.7 FL (ref 9.4–12.3)
POTASSIUM SERPL-SCNC: 3 MMOL/L (ref 3.5–5.1)
RBC # BLD AUTO: 2.48 M/UL (ref 4.05–5.2)
SODIUM SERPL-SCNC: 134 MMOL/L (ref 136–145)
WBC # BLD AUTO: 19.9 K/UL (ref 4.3–11.1)

## 2018-11-17 PROCEDURE — 77030020253 HC SOL INJ D545NS .05 DEX .45 SAL

## 2018-11-17 PROCEDURE — 74011250636 HC RX REV CODE- 250/636: Performed by: SURGERY

## 2018-11-17 PROCEDURE — 74011000250 HC RX REV CODE- 250: Performed by: SURGERY

## 2018-11-17 PROCEDURE — 85025 COMPLETE CBC W/AUTO DIFF WBC: CPT

## 2018-11-17 PROCEDURE — 74011000258 HC RX REV CODE- 258: Performed by: SURGERY

## 2018-11-17 PROCEDURE — C9113 INJ PANTOPRAZOLE SODIUM, VIA: HCPCS | Performed by: SURGERY

## 2018-11-17 PROCEDURE — 74011250637 HC RX REV CODE- 250/637: Performed by: SURGERY

## 2018-11-17 PROCEDURE — 80048 BASIC METABOLIC PNL TOTAL CA: CPT

## 2018-11-17 PROCEDURE — 36415 COLL VENOUS BLD VENIPUNCTURE: CPT

## 2018-11-17 PROCEDURE — 84100 ASSAY OF PHOSPHORUS: CPT

## 2018-11-17 PROCEDURE — 74011250636 HC RX REV CODE- 250/636: Performed by: PHYSICIAN ASSISTANT

## 2018-11-17 PROCEDURE — 74011250636 HC RX REV CODE- 250/636: Performed by: NURSE PRACTITIONER

## 2018-11-17 PROCEDURE — 65270000029 HC RM PRIVATE

## 2018-11-17 PROCEDURE — 83735 ASSAY OF MAGNESIUM: CPT

## 2018-11-17 RX ORDER — OXYCODONE AND ACETAMINOPHEN 7.5; 325 MG/1; MG/1
1 TABLET ORAL
Status: DISCONTINUED | OUTPATIENT
Start: 2018-11-17 | End: 2018-11-19 | Stop reason: HOSPADM

## 2018-11-17 RX ORDER — PANTOPRAZOLE SODIUM 40 MG/1
40 TABLET, DELAYED RELEASE ORAL
Status: DISCONTINUED | OUTPATIENT
Start: 2018-11-17 | End: 2018-11-19 | Stop reason: HOSPADM

## 2018-11-17 RX ORDER — POTASSIUM CHLORIDE 14.9 MG/ML
20 INJECTION INTRAVENOUS
Status: COMPLETED | OUTPATIENT
Start: 2018-11-17 | End: 2018-11-17

## 2018-11-17 RX ORDER — HYDROMORPHONE HYDROCHLORIDE 1 MG/ML
1 INJECTION, SOLUTION INTRAMUSCULAR; INTRAVENOUS; SUBCUTANEOUS
Status: DISCONTINUED | OUTPATIENT
Start: 2018-11-17 | End: 2018-11-18

## 2018-11-17 RX ADMIN — SODIUM CHLORIDE 40 MG: 9 INJECTION, SOLUTION INTRAMUSCULAR; INTRAVENOUS; SUBCUTANEOUS at 08:35

## 2018-11-17 RX ADMIN — POTASSIUM CHLORIDE 20 MEQ: 200 INJECTION, SOLUTION INTRAVENOUS at 12:07

## 2018-11-17 RX ADMIN — PANTOPRAZOLE SODIUM 40 MG: 40 TABLET, DELAYED RELEASE ORAL at 18:11

## 2018-11-17 RX ADMIN — LORAZEPAM 1 MG: 2 INJECTION INTRAMUSCULAR; INTRAVENOUS at 22:15

## 2018-11-17 RX ADMIN — POTASSIUM CHLORIDE 20 MEQ: 200 INJECTION, SOLUTION INTRAVENOUS at 13:59

## 2018-11-17 RX ADMIN — HYDROMORPHONE HYDROCHLORIDE 1 MG: 1 INJECTION, SOLUTION INTRAMUSCULAR; INTRAVENOUS; SUBCUTANEOUS at 16:25

## 2018-11-17 RX ADMIN — ENOXAPARIN SODIUM 40 MG: 40 INJECTION, SOLUTION INTRAVENOUS; SUBCUTANEOUS at 22:07

## 2018-11-17 RX ADMIN — Medication 5 ML: at 14:00

## 2018-11-17 RX ADMIN — DEXTROSE MONOHYDRATE AND SODIUM CHLORIDE 50 ML/HR: 5; .45 INJECTION, SOLUTION INTRAVENOUS at 16:27

## 2018-11-17 NOTE — PROGRESS NOTES
11/17/2018 PLAN: 
Diet- Advance to full liquids DVT Prophylactics- Lovenox/scd Pain control-DC PCA, Ativan for anxiety SUP prophylaxis- Protonix Replace K PRN 
ERIN drain out 11/16/18 Possible ready for discharge later today or tomorrow. ASSESSMENT:  Admit Date: 11/12/2018 5 Days Post-Op  Procedure(s): OPEN SPLENECTOMY/PARTIAL GASTRECTOMY/PANCREATIC CORE BIOPSY/ GASTROJEJUNOSTOMY 
LAPAROSCOPY GENERAL DIAGNOSTIC 
LAPAROTOMY EXPLORATORY SUBJECTIVE: No complaints, denies nausea or vomiting, + BM. Pain controlled with PCA. OBJECTIVE: AF, Abdomen large, soft, non distended. Staples without redness or drainage. Voiding without difficulty. HGB 7.9. K3.0 replaced Visit Vitals /69 (BP 1 Location: Left arm, BP Patient Position: Sitting) Pulse 83 Temp 97.8 °F (36.6 °C) Resp 18 Ht 5' 2\" (1.575 m) Wt 219 lb 8 oz (99.6 kg) SpO2 90% BMI 40.15 kg/m² Intake/Output Summary (Last 24 hours) at 11/17/2018 1037 Last data filed at 11/17/2018 3545 Gross per 24 hour Intake 909 ml Output 1605 ml Net -696 ml Labs:   
Recent Labs 11/17/18 
0419 WBC 19.9* HGB 7.9* * * K 3.0*  
CL 98  
CO2 29 BUN 3*  
CREA 0.46* * Signed: Candido Farooq [facility]

## 2018-11-17 NOTE — PROGRESS NOTES
END OF SHIFT NOTE: 
 
INTAKE/OUTPUT 
11/16 0701 - 11/17 0700 In: 7159 [P.O.:120; I.V.:1023] Out: 1630 [Urine:1600; Drains:30] Voiding: YES Catheter: NO 
Drain:   
 
 
 
 
 
Flatus: Patient does have flatus present. Stool:  0 occurrences. Characteristics: 
Stool Assessment Stool Appearance: Loose(per patient) Emesis: 0 occurrences. Characteristics: VITAL SIGNS Patient Vitals for the past 12 hrs: 
 Temp Pulse Resp BP SpO2  
11/17/18 0315 98 °F (36.7 °C) 81 18 154/84 91 % 11/16/18 2330 98.5 °F (36.9 °C) 84 18 144/79 91 % 11/16/18 1919 99.2 °F (37.3 °C) 88 18 141/83 90 % Pain Assessment Pain Intensity 1: 3 (11/17/18 0322) Pain Location 1: Abdomen Pain Intervention(s) 1: Medication (see MAR) Patient Stated Pain Goal: 2 Ambulating Yes Shift report given to oncoming nurse at the bedside.  
 
Romy Jameson RN

## 2018-11-18 LAB
ANION GAP SERPL CALC-SCNC: 7 MMOL/L (ref 7–16)
BASOPHILS # BLD: 0.1 K/UL (ref 0–0.2)
BASOPHILS NFR BLD: 0 % (ref 0–2)
BUN SERPL-MCNC: 2 MG/DL (ref 6–23)
CALCIUM SERPL-MCNC: 8.2 MG/DL (ref 8.3–10.4)
CHLORIDE SERPL-SCNC: 99 MMOL/L (ref 98–107)
CO2 SERPL-SCNC: 30 MMOL/L (ref 21–32)
CREAT SERPL-MCNC: 0.36 MG/DL (ref 0.6–1)
DIFFERENTIAL METHOD BLD: ABNORMAL
EOSINOPHIL # BLD: 2 K/UL (ref 0–0.8)
EOSINOPHIL NFR BLD: 9 % (ref 0.5–7.8)
ERYTHROCYTE [DISTWIDTH] IN BLOOD BY AUTOMATED COUNT: 14.5 %
GLUCOSE SERPL-MCNC: 120 MG/DL (ref 65–100)
HCT VFR BLD AUTO: 24.6 % (ref 35.8–46.3)
HGB BLD-MCNC: 8.2 G/DL (ref 11.7–15.4)
IMM GRANULOCYTES # BLD: 0.2 K/UL (ref 0–0.5)
IMM GRANULOCYTES NFR BLD AUTO: 1 % (ref 0–5)
LYMPHOCYTES # BLD: 1.9 K/UL (ref 0.5–4.6)
LYMPHOCYTES NFR BLD: 9 % (ref 13–44)
MAGNESIUM SERPL-MCNC: 2.1 MG/DL (ref 1.8–2.4)
MCH RBC QN AUTO: 32.2 PG (ref 26.1–32.9)
MCHC RBC AUTO-ENTMCNC: 33.3 G/DL (ref 31.4–35)
MCV RBC AUTO: 96.5 FL (ref 79.6–97.8)
MONOCYTES # BLD: 1.9 K/UL (ref 0.1–1.3)
MONOCYTES NFR BLD: 9 % (ref 4–12)
NEUTS SEG # BLD: 15 K/UL (ref 1.7–8.2)
NEUTS SEG NFR BLD: 71 % (ref 43–78)
NRBC # BLD: 0 K/UL (ref 0–0.2)
PHOSPHATE SERPL-MCNC: 3.6 MG/DL (ref 2.5–4.5)
PLATELET # BLD AUTO: 808 K/UL (ref 150–450)
PMV BLD AUTO: 9.8 FL (ref 9.4–12.3)
POTASSIUM SERPL-SCNC: 3.5 MMOL/L (ref 3.5–5.1)
RBC # BLD AUTO: 2.55 M/UL (ref 4.05–5.2)
SODIUM SERPL-SCNC: 136 MMOL/L (ref 136–145)
WBC # BLD AUTO: 21.1 K/UL (ref 4.3–11.1)

## 2018-11-18 PROCEDURE — 83735 ASSAY OF MAGNESIUM: CPT

## 2018-11-18 PROCEDURE — 84100 ASSAY OF PHOSPHORUS: CPT

## 2018-11-18 PROCEDURE — 94760 N-INVAS EAR/PLS OXIMETRY 1: CPT

## 2018-11-18 PROCEDURE — 85025 COMPLETE CBC W/AUTO DIFF WBC: CPT

## 2018-11-18 PROCEDURE — 80048 BASIC METABOLIC PNL TOTAL CA: CPT

## 2018-11-18 PROCEDURE — 36415 COLL VENOUS BLD VENIPUNCTURE: CPT

## 2018-11-18 PROCEDURE — 77010033678 HC OXYGEN DAILY

## 2018-11-18 PROCEDURE — 74011250637 HC RX REV CODE- 250/637: Performed by: NURSE PRACTITIONER

## 2018-11-18 PROCEDURE — 74011250637 HC RX REV CODE- 250/637: Performed by: SURGERY

## 2018-11-18 PROCEDURE — 74011250636 HC RX REV CODE- 250/636: Performed by: PHYSICIAN ASSISTANT

## 2018-11-18 PROCEDURE — 65270000029 HC RM PRIVATE

## 2018-11-18 PROCEDURE — 74011250636 HC RX REV CODE- 250/636: Performed by: SURGERY

## 2018-11-18 RX ORDER — DOCUSATE SODIUM 100 MG/1
100 CAPSULE, LIQUID FILLED ORAL 2 TIMES DAILY
Status: DISCONTINUED | OUTPATIENT
Start: 2018-11-18 | End: 2018-11-19 | Stop reason: HOSPADM

## 2018-11-18 RX ORDER — ASPIRIN 81 MG/1
81 TABLET ORAL DAILY
Status: DISCONTINUED | OUTPATIENT
Start: 2018-11-18 | End: 2018-11-19 | Stop reason: HOSPADM

## 2018-11-18 RX ORDER — LORAZEPAM 1 MG/1
2 TABLET ORAL
Status: DISCONTINUED | OUTPATIENT
Start: 2018-11-18 | End: 2018-11-19 | Stop reason: HOSPADM

## 2018-11-18 RX ADMIN — PANTOPRAZOLE SODIUM 40 MG: 40 TABLET, DELAYED RELEASE ORAL at 17:15

## 2018-11-18 RX ADMIN — Medication 5 ML: at 13:00

## 2018-11-18 RX ADMIN — OXYCODONE HYDROCHLORIDE AND ACETAMINOPHEN 1 TABLET: 7.5; 325 TABLET ORAL at 12:57

## 2018-11-18 RX ADMIN — ENOXAPARIN SODIUM 40 MG: 40 INJECTION, SOLUTION INTRAVENOUS; SUBCUTANEOUS at 21:53

## 2018-11-18 RX ADMIN — LORAZEPAM 2 MG: 1 TABLET ORAL at 21:53

## 2018-11-18 RX ADMIN — ASPIRIN 81 MG: 81 TABLET, COATED ORAL at 12:57

## 2018-11-18 RX ADMIN — OXYCODONE HYDROCHLORIDE AND ACETAMINOPHEN 1 TABLET: 7.5; 325 TABLET ORAL at 17:15

## 2018-11-18 RX ADMIN — LORAZEPAM 1 MG: 2 INJECTION INTRAMUSCULAR; INTRAVENOUS at 09:31

## 2018-11-18 RX ADMIN — PANTOPRAZOLE SODIUM 40 MG: 40 TABLET, DELAYED RELEASE ORAL at 07:30

## 2018-11-18 RX ADMIN — Medication 5 ML: at 21:57

## 2018-11-18 NOTE — PROGRESS NOTES
END OF SHIFT NOTE: 
 
INTAKE/OUTPUT 
11/17 0701 - 11/18 0700 In: 1106 [I.V.:1106] Out: 1000 [Urine:1000] Voiding: YES Catheter: NO 
Drain:   
 
 
 
 
 
Flatus: Patient does have flatus present. Stool:  0 occurrences. Characteristics: 
Stool Assessment Stool Appearance: Loose(per patient) Emesis: 0 occurrences. Characteristics: VITAL SIGNS Patient Vitals for the past 12 hrs: 
 Temp Pulse Resp BP SpO2  
11/18/18 0330 99 °F (37.2 °C) 89 18 138/84 91 % 11/17/18 2300 98.2 °F (36.8 °C) 85 18 135/85 96 % 11/17/18 1900 98.6 °F (37 °C) 86 18 140/70 93 % Pain Assessment Pain Intensity 1: 3 (11/18/18 0211) Pain Location 1: Abdomen Pain Intervention(s) 1: Medication (see MAR) Patient Stated Pain Goal: 2 Ambulating Yes Shift report given to oncoming nurse at the bedside.  
 
Arely Norwood RN

## 2018-11-18 NOTE — PROGRESS NOTES
11/18/18 1089 Oxygen Therapy O2 Sat (%) 98 % O2 Device Nasal cannula O2 Flow Rate (L/min) 2 l/min 
(decreased tyo 1)

## 2018-11-18 NOTE — PROGRESS NOTES
11/18/2018 PLAN: 
Diet- GI Soft DVT Prophylactics- Lovenox/scd Pain control, Ativan for anxiety - 1mg IV now 
SUP prophylaxis- Protonix Replace K PRN 
ERIN drain out 11/16/18 Hopefully home within next 24 hours ASSESSMENT:  Admit Date: 11/12/2018 6 Days Post-Op  Procedure(s): OPEN SPLENECTOMY/PARTIAL GASTRECTOMY/PANCREATIC CORE BIOPSY/ GASTROJEJUNOSTOMY 
LAPAROSCOPY GENERAL DIAGNOSTIC 
LAPAROTOMY EXPLORATORY SUBJECTIVE: Pt sitting in recliner. Pt tearful/ crying stating she is feeling anxious - requesting medication for anxiety. Pain controlled. Tolerating soft diet. No nausea or vomiting, + flatus/+BM. OBJECTIVE: AF, Abdomen large, soft, non distended. Staples without redness or drainage. Voiding without difficulty. HGB 8.2. K3.5, Visit Vitals /85 (BP 1 Location: Left arm, BP Patient Position: Sitting) Pulse 85 Temp 98.5 °F (36.9 °C) Resp 18 Ht 5' 2\" (1.575 m) Wt 219 lb 8 oz (99.6 kg) SpO2 98% BMI 40.15 kg/m² Intake/Output Summary (Last 24 hours) at 11/18/2018 1007 Last data filed at 11/17/2018 2300 Gross per 24 hour Intake 1106 ml Output 1000 ml Net 106 ml Labs:   
Recent Labs 11/18/18 
1604 WBC 21.1* HGB 8.2*  
*   
K 3.5 CL 99  
CO2 30 BUN 2*  
CREA 0.36* * Signed: BELINDA Oliveira

## 2018-11-19 VITALS
OXYGEN SATURATION: 93 % | DIASTOLIC BLOOD PRESSURE: 64 MMHG | SYSTOLIC BLOOD PRESSURE: 148 MMHG | HEIGHT: 62 IN | RESPIRATION RATE: 18 BRPM | WEIGHT: 219.5 LBS | BODY MASS INDEX: 40.39 KG/M2 | HEART RATE: 82 BPM | TEMPERATURE: 99 F

## 2018-11-19 LAB
ANION GAP SERPL CALC-SCNC: 9 MMOL/L (ref 7–16)
BASOPHILS # BLD: 0.1 K/UL (ref 0–0.2)
BASOPHILS NFR BLD: 0 % (ref 0–2)
BUN SERPL-MCNC: 3 MG/DL (ref 6–23)
CALCIUM SERPL-MCNC: 8.2 MG/DL (ref 8.3–10.4)
CHLORIDE SERPL-SCNC: 99 MMOL/L (ref 98–107)
CO2 SERPL-SCNC: 30 MMOL/L (ref 21–32)
CREAT SERPL-MCNC: 0.48 MG/DL (ref 0.6–1)
DIFFERENTIAL METHOD BLD: ABNORMAL
EOSINOPHIL # BLD: 2.1 K/UL (ref 0–0.8)
EOSINOPHIL NFR BLD: 9 % (ref 0.5–7.8)
ERYTHROCYTE [DISTWIDTH] IN BLOOD BY AUTOMATED COUNT: 14.6 %
GLUCOSE SERPL-MCNC: 118 MG/DL (ref 65–100)
HCT VFR BLD AUTO: 25.7 % (ref 35.8–46.3)
HGB BLD-MCNC: 8.4 G/DL (ref 11.7–15.4)
IMM GRANULOCYTES # BLD: 0.3 K/UL (ref 0–0.5)
IMM GRANULOCYTES NFR BLD AUTO: 1 % (ref 0–5)
LYMPHOCYTES # BLD: 2.4 K/UL (ref 0.5–4.6)
LYMPHOCYTES NFR BLD: 10 % (ref 13–44)
MCH RBC QN AUTO: 31.8 PG (ref 26.1–32.9)
MCHC RBC AUTO-ENTMCNC: 32.7 G/DL (ref 31.4–35)
MCV RBC AUTO: 97.3 FL (ref 79.6–97.8)
MONOCYTES # BLD: 1.8 K/UL (ref 0.1–1.3)
MONOCYTES NFR BLD: 8 % (ref 4–12)
NEUTS SEG # BLD: 16.3 K/UL (ref 1.7–8.2)
NEUTS SEG NFR BLD: 71 % (ref 43–78)
NRBC # BLD: 0 K/UL (ref 0–0.2)
PLATELET # BLD AUTO: 922 K/UL (ref 150–450)
PMV BLD AUTO: 9.6 FL (ref 9.4–12.3)
POTASSIUM SERPL-SCNC: 2.6 MMOL/L (ref 3.5–5.1)
RBC # BLD AUTO: 2.64 M/UL (ref 4.05–5.2)
SODIUM SERPL-SCNC: 138 MMOL/L (ref 136–145)
WBC # BLD AUTO: 23 K/UL (ref 4.3–11.1)

## 2018-11-19 PROCEDURE — 80048 BASIC METABOLIC PNL TOTAL CA: CPT

## 2018-11-19 PROCEDURE — 90734 MENACWYD/MENACWYCRM VACC IM: CPT | Performed by: NURSE PRACTITIONER

## 2018-11-19 PROCEDURE — 90648 HIB PRP-T VACCINE 4 DOSE IM: CPT | Performed by: PHYSICIAN ASSISTANT

## 2018-11-19 PROCEDURE — 74011250636 HC RX REV CODE- 250/636: Performed by: SURGERY

## 2018-11-19 PROCEDURE — 90732 PPSV23 VACC 2 YRS+ SUBQ/IM: CPT | Performed by: PHYSICIAN ASSISTANT

## 2018-11-19 PROCEDURE — 74011250637 HC RX REV CODE- 250/637: Performed by: NURSE PRACTITIONER

## 2018-11-19 PROCEDURE — 74011250637 HC RX REV CODE- 250/637: Performed by: SURGERY

## 2018-11-19 PROCEDURE — 36415 COLL VENOUS BLD VENIPUNCTURE: CPT

## 2018-11-19 PROCEDURE — 90471 IMMUNIZATION ADMIN: CPT

## 2018-11-19 PROCEDURE — 74011250636 HC RX REV CODE- 250/636: Performed by: NURSE PRACTITIONER

## 2018-11-19 PROCEDURE — 77030020263 HC SOL INJ SOD CL0.9% LFCR 1000ML

## 2018-11-19 PROCEDURE — 74011250636 HC RX REV CODE- 250/636: Performed by: PHYSICIAN ASSISTANT

## 2018-11-19 PROCEDURE — 85025 COMPLETE CBC W/AUTO DIFF WBC: CPT

## 2018-11-19 RX ORDER — POTASSIUM CHLORIDE 20 MEQ/1
40 TABLET, EXTENDED RELEASE ORAL DAILY
Qty: 14 TAB | Refills: 0 | Status: SHIPPED | OUTPATIENT
Start: 2018-11-19 | End: 2018-11-26

## 2018-11-19 RX ORDER — LORAZEPAM 2 MG/1
2 TABLET ORAL
Qty: 20 TAB | Refills: 0 | Status: SHIPPED | OUTPATIENT
Start: 2018-11-19 | End: 2018-12-04 | Stop reason: SDUPTHER

## 2018-11-19 RX ORDER — DOCUSATE SODIUM 100 MG/1
100 CAPSULE, LIQUID FILLED ORAL 2 TIMES DAILY
Qty: 60 CAP | Refills: 0 | Status: SHIPPED | OUTPATIENT
Start: 2018-11-19 | End: 2018-12-19

## 2018-11-19 RX ORDER — POTASSIUM CHLORIDE 14.9 MG/ML
20 INJECTION INTRAVENOUS
Status: COMPLETED | OUTPATIENT
Start: 2018-11-19 | End: 2018-11-19

## 2018-11-19 RX ORDER — POTASSIUM CHLORIDE 20 MEQ/1
40 TABLET, EXTENDED RELEASE ORAL DAILY
Status: DISCONTINUED | OUTPATIENT
Start: 2018-11-19 | End: 2018-11-19 | Stop reason: HOSPADM

## 2018-11-19 RX ORDER — ENOXAPARIN SODIUM 100 MG/ML
40 INJECTION SUBCUTANEOUS EVERY 24 HOURS
Qty: 14 SYRINGE | Refills: 0 | Status: SHIPPED | OUTPATIENT
Start: 2018-11-19 | End: 2018-12-03

## 2018-11-19 RX ORDER — PANTOPRAZOLE SODIUM 40 MG/1
40 TABLET, DELAYED RELEASE ORAL
Qty: 30 TAB | Refills: 2 | Status: SHIPPED | OUTPATIENT
Start: 2018-11-19 | End: 2019-01-15 | Stop reason: SDUPTHER

## 2018-11-19 RX ORDER — OXYCODONE AND ACETAMINOPHEN 7.5; 325 MG/1; MG/1
1 TABLET ORAL
Qty: 30 TAB | Refills: 0 | Status: SHIPPED | OUTPATIENT
Start: 2018-11-19 | End: 2018-12-04 | Stop reason: SDUPTHER

## 2018-11-19 RX ORDER — ASPIRIN 81 MG/1
81 TABLET ORAL DAILY
Qty: 30 TAB | Refills: 2 | Status: SHIPPED | OUTPATIENT
Start: 2018-11-19 | End: 2018-12-12

## 2018-11-19 RX ADMIN — MENINGOCOCCAL (GROUPS A, C, Y AND W-135) OLIGOSACCHARIDE DIPHTHERIA CRM197 CONJUGATE VACCINE 0.5 ML: KIT at 15:00

## 2018-11-19 RX ADMIN — POTASSIUM CHLORIDE 20 MEQ: 200 INJECTION, SOLUTION INTRAVENOUS at 07:52

## 2018-11-19 RX ADMIN — Medication 10 ML: at 06:00

## 2018-11-19 RX ADMIN — PNEUMOCOCCAL VACCINE POLYVALENT 0.5 ML
25; 25; 25; 25; 25; 25; 25; 25; 25; 25; 25; 25; 25; 25; 25; 25; 25; 25; 25; 25; 25; 25; 25 INJECTION, SOLUTION INTRAMUSCULAR; SUBCUTANEOUS at 14:19

## 2018-11-19 RX ADMIN — PANTOPRAZOLE SODIUM 40 MG: 40 TABLET, DELAYED RELEASE ORAL at 07:51

## 2018-11-19 RX ADMIN — POTASSIUM CHLORIDE 40 MEQ: 20 TABLET, EXTENDED RELEASE ORAL at 09:13

## 2018-11-19 RX ADMIN — Medication 5 ML: at 13:06

## 2018-11-19 RX ADMIN — HAEMOPHILUS B POLYSACCHARIDE CONJUGATE VACCINE FOR INJ 0.5 ML: RECON SOLN at 14:22

## 2018-11-19 RX ADMIN — POTASSIUM CHLORIDE 20 MEQ: 200 INJECTION, SOLUTION INTRAVENOUS at 11:28

## 2018-11-19 RX ADMIN — OXYCODONE HYDROCHLORIDE AND ACETAMINOPHEN 1 TABLET: 7.5; 325 TABLET ORAL at 10:18

## 2018-11-19 RX ADMIN — ASPIRIN 81 MG: 81 TABLET, COATED ORAL at 09:13

## 2018-11-19 NOTE — PROGRESS NOTES
END OF SHIFT NOTE: 
 
INTAKE/OUTPUT No intake/output data recorded. Voiding: YES Catheter: NO 
Drain:   
 
 
 
 
 
Flatus: Patient does have flatus present. Stool:  0 occurrences. Characteristics: 
Stool Assessment Stool Appearance: (per patient 2 large BMs) Emesis: 0 occurrences. Characteristics: VITAL SIGNS Patient Vitals for the past 12 hrs: 
 Temp Pulse Resp BP SpO2  
11/19/18 0400 98.6 °F (37 °C) 86 18 153/83 92 % 11/19/18 0004 99.1 °F (37.3 °C) 83 18 124/71 91 % 11/18/18 1949 98.6 °F (37 °C) 79 18 147/78 95 % Pain Assessment Pain Intensity 1: 0 (11/19/18 0331) Pain Location 1: Abdomen Pain Intervention(s) 1: Medication (see MAR) Patient Stated Pain Goal: 2 Ambulating Yes Shift report given to oncoming nurse at the bedside.  
 
Saba Saucedo RN

## 2018-11-19 NOTE — DISCHARGE SUMMARY
Charlotte Mercado MRN: [id number]     : 1960     Age: 66 y.o. Admit date: 2018     Discharge date: 2018 Attending Physician: Dr. Kirk Bishop MD 
Primary Discharge Diagnosis:  
Principal Problem: Mass of pancreas (2018) Primary Operations or Procedures Performed : 
Procedure(s): OPEN SPLENECTOMY/PARTIAL GASTRECTOMY/PANCREATIC CORE BIOPSY/ GASTROJEJUNOSTOMY 
LAPAROSCOPY GENERAL DIAGNOSTIC 
LAPAROTOMY EXPLORATORY Brief History and Reason for Admission: Charlotte Mercado was admitted with the following history of present illness. Charlotte Mercado is a 66 y.o. female urgent referral form Dr Emerson An. Patient is a hospice nurse. Patient states she started having abdominal pain about 1 month ago. She describes pain as sharp and shooting across mid abdomen, she also states it is crampy in nature at times and sometimes feels like a vice . She complains of vomiting daily without nausea and c/o bloating. She states she is not losing weight and her appetite is still good but she gets full fast. She does complain of some fatigue. Denies any cardiopulmonary issues. She does smoke a pack per day for about 30 years and drinks socially. 
  
Family history of cancer- Aunt with gastric cancer. Medical history HTN. BMI 40. Abdominal surgeries had open cholecystectomy age 27 and had JULIO C. Left subcostal scar she states was a lipoma removal.  Denies taking any blood thinners. 
  
 
   
   
Hospital Course:  Uneventful for this procedure, patient was informed as stage 4 disease, unresectable. Condition at Discharge: good Discharge Medications:  
Current Discharge Medication List  
  
START taking these medications Details  
aspirin delayed-release 81 mg tablet Take 1 Tab by mouth daily for 90 days. Qty: 30 Tab, Refills: 2  
  
docusate sodium (COLACE) 100 mg capsule Take 1 Cap by mouth two (2) times a day for 30 days. Qty: 60 Cap, Refills: 0 enoxaparin (LOVENOX) 40 mg/0.4 mL 0.4 mL by SubCUTAneous route every twenty-four (24) hours for 14 days. Qty: 14 Syringe, Refills: 0 LORazepam (ATIVAN) 2 mg tablet Take 1 Tab by mouth every six (6) hours as needed. Max Daily Amount: 8 mg. Qty: 20 Tab, Refills: 0 Associated Diagnoses: Mass of pancreas  
  
oxyCODONE-acetaminophen (PERCOCET 7.5) 7.5-325 mg per tablet Take 1 Tab by mouth every four (4) hours as needed. Max Daily Amount: 6 Tabs. Qty: 30 Tab, Refills: 0 Associated Diagnoses: Mass of pancreas  
  
pantoprazole (PROTONIX) 40 mg tablet Take 1 Tab by mouth Before breakfast and dinner. Qty: 30 Tab, Refills: 2  
  
potassium chloride (K-DUR, KLOR-CON) 20 mEq tablet Take 2 Tabs by mouth daily for 7 days. Qty: 14 Tab, Refills: 0 CONTINUE these medications which have NOT CHANGED Details  
atorvastatin (LIPITOR) 20 mg tablet Take 1 Tab by mouth daily. Qty: 30 Tab, Refills: 3 Associated Diagnoses: Hypercholesterolemia  
  
lisinopril (PRINIVIL, ZESTRIL) 20 mg tablet Take 1 Tab by mouth daily. Qty: 30 Tab, Refills: 6 Associated Diagnoses: Essential hypertension Disposition/Discharge Instructions/Follow-up Care: Follow-up with Dr. Alvina Pisano 
Keep incisions clean and dry, may remain uncovered. Do not apply lotions, creams or ointments to incisions. 
  
Diet - as tolerated- GI soft Activity - ambulate - as tolerated - no heavy lifting >20lb. May shower - no tub baths or soaking/submerging. 
  
No driving while taking narcotics. Do not drink alcohol while taking narcotics. Resume other home medications. Take Rx as prescribed Take stool softeners while on narcotics to avoid constipation Take lovenox as prescribed KCL for 7 days Take aspirin daily 
 
  
If problems or questions arise, please call our office at [phone]. 
  
Greater than 30 minutes were spent discharging the patient Signed:  Barbara Franklin NP  11/19/2018  7:53 AM

## 2018-11-19 NOTE — DISCHARGE INSTRUCTIONS
Discharge Instructions/Follow-up Plans:   MD Instructions:     Follow-up with Dr. Nithya Mayen incisions clean and dry, may remain uncovered. Do not apply lotions, creams or ointments to incisions.     Diet - as tolerated- GI soft  Activity - ambulate - as tolerated - no heavy lifting >20lb. May shower - no tub baths or soaking/submerging.     No driving while taking narcotics. Do not drink alcohol while taking narcotics. Resume other home medications. Take Rx as prescribed   Take stool softeners while on narcotics to avoid constipation  Take lovenox as prescribed  KCL for 7 days  Take aspirin daily       If problems or questions arise, please call our office at [phone].     Greater than 30 minutes were spent discharging the patient      DISCHARGE SUMMARY from Nurse    PATIENT INSTRUCTIONS:    After general anesthesia or intravenous sedation, for 24 hours or while taking prescription Narcotics:  · Limit your activities  · Do not drive and operate hazardous machinery  · Do not make important personal or business decisions  · Do  not drink alcoholic beverages  · If you have not urinated within 8 hours after discharge, please contact your surgeon on call. Report the following to your surgeon:  · Excessive pain, swelling, redness or odor of or around the surgical area  · Temperature over 100.5  · Nausea and vomiting lasting longer than 4 hours or if unable to take medications  · Any signs of decreased circulation or nerve impairment to extremity: change in color, persistent  numbness, tingling, coldness or increase pain  · Any questions    What to do at Home:  Recommended activity: Activity as tolerated,    If you experience any of the following symptoms fever greater then 100.5, pain unrelieved by medication, increase in shortness of breath, please follow up with primary . *  Please give a list of your current medications to your Primary Care Provider.     *  Please update this list whenever your medications are discontinued, doses are      changed, or new medications (including over-the-counter products) are added. *  Please carry medication information at all times in case of emergency situations. These are general instructions for a healthy lifestyle:    No smoking/ No tobacco products/ Avoid exposure to second hand smoke  Surgeon General's Warning:  Quitting smoking now greatly reduces serious risk to your health. Obesity, smoking, and sedentary lifestyle greatly increases your risk for illness    A healthy diet, regular physical exercise & weight monitoring are important for maintaining a healthy lifestyle    You may be retaining fluid if you have a history of heart failure or if you experience any of the following symptoms:  Weight gain of 3 pounds or more overnight or 5 pounds in a week, increased swelling in our hands or feet or shortness of breath while lying flat in bed. Please call your doctor as soon as you notice any of these symptoms; do not wait until your next office visit. Recognize signs and symptoms of STROKE:    F-face looks uneven    A-arms unable to move or move unevenly    S-speech slurred or non-existent    T-time-call 911 as soon as signs and symptoms begin-DO NOT go       Back to bed or wait to see if you get better-TIME IS BRAIN. Warning Signs of HEART ATTACK     Call 911 if you have these symptoms:   Chest discomfort. Most heart attacks involve discomfort in the center of the chest that lasts more than a few minutes, or that goes away and comes back. It can feel like uncomfortable pressure, squeezing, fullness, or pain.  Discomfort in other areas of the upper body. Symptoms can include pain or discomfort in one or both arms, the back, neck, jaw, or stomach.  Shortness of breath with or without chest discomfort.  Other signs may include breaking out in a cold sweat, nausea, or lightheadedness. Don't wait more than five minutes to call 911 - MINUTES MATTER! Fast action can save your life. Calling 911 is almost always the fastest way to get lifesaving treatment. Emergency Medical Services staff can begin treatment when they arrive -- up to an hour sooner than if someone gets to the hospital by car. The discharge information has been reviewed with the patient. The patient verbalized understanding. Discharge medications reviewed with the patient and appropriate educational materials and side effects teaching were provided.   ___________________________________________________________________________________________________________________________________

## 2018-11-19 NOTE — PROGRESS NOTES
Gave discharge instructions to the pt. The pt voices a clear understanding of all instructions. No IV to remove. Pt is ready to be transported to the  area.

## 2018-11-27 ENCOUNTER — HOSPITAL ENCOUNTER (OUTPATIENT)
Dept: LAB | Age: 58
Discharge: HOME | End: 2018-11-27
Payer: COMMERCIAL

## 2018-11-27 DIAGNOSIS — K86.89 MASS OF PANCREAS: ICD-10-CM

## 2018-11-27 LAB
ANION GAP SERPL CALC-SCNC: 7 MMOL/L (ref 7–16)
BASOPHILS # BLD: 0.1 K/UL (ref 0–0.2)
BASOPHILS NFR BLD: 1 % (ref 0–2)
BUN SERPL-MCNC: 10 MG/DL (ref 6–23)
CALCIUM SERPL-MCNC: 9.2 MG/DL (ref 8.3–10.4)
CHLORIDE SERPL-SCNC: 97 MMOL/L (ref 98–107)
CO2 SERPL-SCNC: 28 MMOL/L (ref 21–32)
CREAT SERPL-MCNC: 0.82 MG/DL (ref 0.6–1)
DIFFERENTIAL METHOD BLD: ABNORMAL
EOSINOPHIL # BLD: 0.7 K/UL (ref 0–0.8)
EOSINOPHIL NFR BLD: 5 % (ref 0.5–7.8)
ERYTHROCYTE [DISTWIDTH] IN BLOOD BY AUTOMATED COUNT: 15.1 %
GLUCOSE SERPL-MCNC: 107 MG/DL (ref 65–100)
HCT VFR BLD AUTO: 34.8 % (ref 35.8–46.3)
HGB BLD-MCNC: 11 G/DL (ref 11.7–15.4)
IMM GRANULOCYTES # BLD: 0.1 K/UL (ref 0–0.5)
IMM GRANULOCYTES NFR BLD AUTO: 1 % (ref 0–5)
LYMPHOCYTES # BLD: 2.8 K/UL (ref 0.5–4.6)
LYMPHOCYTES NFR BLD: 19 % (ref 13–44)
MAGNESIUM SERPL-MCNC: 2.7 MG/DL (ref 1.8–2.4)
MCH RBC QN AUTO: 30 PG (ref 26.1–32.9)
MCHC RBC AUTO-ENTMCNC: 31.6 G/DL (ref 31.4–35)
MCV RBC AUTO: 94.8 FL (ref 79.6–97.8)
MONOCYTES # BLD: 1.2 K/UL (ref 0.1–1.3)
MONOCYTES NFR BLD: 8 % (ref 4–12)
NEUTS SEG # BLD: 9.8 K/UL (ref 1.7–8.2)
NEUTS SEG NFR BLD: 66 % (ref 43–78)
NRBC # BLD: 0 K/UL (ref 0–0.2)
PHOSPHATE SERPL-MCNC: 4.2 MG/DL (ref 2.5–4.5)
PLATELET # BLD AUTO: 1094 K/UL (ref 150–450)
PLATELET COMMENTS,PCOM: ABNORMAL
PMV BLD AUTO: 8.6 FL (ref 9.4–12.3)
POTASSIUM SERPL-SCNC: 4.6 MMOL/L (ref 3.5–5.1)
RBC # BLD AUTO: 3.67 M/UL (ref 4.05–5.2)
RBC MORPH BLD: ABNORMAL
SODIUM SERPL-SCNC: 132 MMOL/L (ref 136–145)
WBC # BLD AUTO: 14.7 K/UL (ref 4.3–11.1)
WBC MORPH BLD: ABNORMAL

## 2018-11-27 PROCEDURE — 84100 ASSAY OF PHOSPHORUS: CPT

## 2018-11-27 PROCEDURE — 80048 BASIC METABOLIC PNL TOTAL CA: CPT

## 2018-11-27 PROCEDURE — 36415 COLL VENOUS BLD VENIPUNCTURE: CPT

## 2018-11-27 PROCEDURE — 83735 ASSAY OF MAGNESIUM: CPT

## 2018-11-27 PROCEDURE — 85025 COMPLETE CBC W/AUTO DIFF WBC: CPT

## 2018-12-06 ENCOUNTER — PATIENT OUTREACH (OUTPATIENT)
Dept: CASE MANAGEMENT | Age: 58
End: 2018-12-06

## 2018-12-06 PROBLEM — E66.01 SEVERE OBESITY (HCC): Status: ACTIVE | Noted: 2018-12-06

## 2018-12-06 PROBLEM — C25.2 MALIGNANT NEOPLASM OF TAIL OF PANCREAS (HCC): Status: ACTIVE | Noted: 2018-12-06

## 2018-12-06 NOTE — PROGRESS NOTES
12/6/18 saw pt today with Dr. Adis Person for initial medical oncology consult for pancreatic cancer.  is here with her today. Treatment options discussed. Recommendation is FOLFIRINOX, followed by possible surgery and then radiation. Potential side effects reviewed. She is agreeable with the plan. Will arrange - chemo education and port placement with Dr. Phil Gonsalves. 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 Rx sent to pharmacy. Plan to start 12/26. Provided opportunity to ask questions and all were discussed. My contact information was provided and I encouraged her to call with any questions or concerns. Navigation will continue to follow.

## 2018-12-07 RX ORDER — SODIUM CHLORIDE 0.9 % (FLUSH) 0.9 %
5-10 SYRINGE (ML) INJECTION EVERY 8 HOURS
Status: CANCELLED | OUTPATIENT
Start: 2018-12-07

## 2018-12-07 RX ORDER — SODIUM CHLORIDE 0.9 % (FLUSH) 0.9 %
5-10 SYRINGE (ML) INJECTION AS NEEDED
Status: CANCELLED | OUTPATIENT
Start: 2018-12-07

## 2018-12-11 ENCOUNTER — DOCUMENTATION ONLY (OUTPATIENT)
Dept: HEMATOLOGY | Age: 58
End: 2018-12-11

## 2018-12-11 ENCOUNTER — DOCUMENTATION ONLY (OUTPATIENT)
Dept: PHARMACY | Age: 58
End: 2018-12-11

## 2018-12-11 NOTE — PROGRESS NOTES
Charlotte Mercado is a 66 y. o.female with pancreatic cancer who presents for chemotherapy education for the following medications:  Folfirinox every 2 weeks x 3 months. Schedule, frequency, and duration of chemotherapy was discussed with patient. The patient was given handouts published by the [facility] entitled, \"Chemotherapy and You\" and \"Eating Hints Before, During, and After Cancer Treatment\" for their reference. Medication specific information was printed from [employer] and distributed to the patient. Self care guidelines were distributed and discussed with the patient and included the following. ... 1) Potential long term and short term side effects of therapy including fertility risks for appropriate patients    2)  Symptoms and side effects that require the patient to contact the [address] [place] [address] or require immediate attention    3)  Symptoms or events that require immediate discontinuation of oral or self administered treatments    4)  Procedures for handling medications at home, including storage, safe handling, and management of unused medications    5)  Procedures for handling bodily fluids and waste in the home. 6)  The [address] [place] [address]'s contact information with availability and instructions on who and when to call    7)  The [address] [place] [address] appointment policy and expectations for rescheduling or canceling. Time was then allowed to accept patient questions. Patient and/or caregiver verbalized understanding of their treatment plan.      Paula Ann, PharmD  Oncology clinical pharmacist  [phone]

## 2018-12-12 ENCOUNTER — ANESTHESIA EVENT (OUTPATIENT)
Dept: SURGERY | Age: 58
End: 2018-12-12
Payer: COMMERCIAL

## 2018-12-12 NOTE — PROGRESS NOTES
I spoke with Ms. Caleb Hwang regarding her insurance coverage, potential oral medication authorizations, enrollment in the [facility] [address] ([facility]) and the [facility] ([address]), and assistance organization resource sheet. Ms. Caleb Hwang' [facility] insurance termed on 11/4/18. Her  was laid off work. COBRA insurance is being instated. She was told that the COBRA would retro back to 11/5/18. Payments have been sent in. Insurance benefits are unable to be verified at this time. I spoke with Ms. Caleb Hwang about the hospital financial assistance and the co-pay assistance nevin with [facility]. I went over both programs, applications, and what each program potentially covers if the patient is approved. Next, I spoke with Ms. Jesus regarding potential oral medication authorizations. I told her that if she ever had any problems getting her oral medications filled to give the dedicated [facility] , Latricia Jain, a call. Most of the time, it is simply an authorization that needs to be done with the insurance company. Next, I spoke with Ms. Caleb Hwang regarding enrolling with [facility] and [facility]. I went over some of the services that [facility] and [facility] offers and the enrollment process. Next, I gave Ms. Caleb Hwang flyers about the free Yoga classes for cancer survivors and the Oncology Massage program.  I let her know that she can get a free 30 minute massage. Lastly, I gave Ms. Caleb Hwang a form with various resource organizations that could assist with specific needs (example:  transportation, lodging, preparing meals, home cleaning)     Faxed Patient Referral form to the [facility] e at [phone]. Phone [phone]. Form scanned into chart. Faxed Physician's Statement to the [facility] at [phone]. Phone [phone]. Form scanned into chart.

## 2018-12-13 ENCOUNTER — ANESTHESIA (OUTPATIENT)
Dept: SURGERY | Age: 58
End: 2018-12-13
Payer: COMMERCIAL

## 2018-12-13 ENCOUNTER — HOSPITAL ENCOUNTER (OUTPATIENT)
Age: 58
Setting detail: OUTPATIENT SURGERY
Discharge: HOME | End: 2018-12-13
Attending: SURGERY | Admitting: SURGERY
Payer: COMMERCIAL

## 2018-12-13 ENCOUNTER — APPOINTMENT (OUTPATIENT)
Dept: GENERAL RADIOLOGY | Age: 58
End: 2018-12-13
Attending: SURGERY
Payer: COMMERCIAL

## 2018-12-13 VITALS
HEART RATE: 93 BPM | SYSTOLIC BLOOD PRESSURE: 138 MMHG | RESPIRATION RATE: 21 BRPM | DIASTOLIC BLOOD PRESSURE: 62 MMHG | BODY MASS INDEX: 37.02 KG/M2 | OXYGEN SATURATION: 95 % | HEIGHT: 62 IN | TEMPERATURE: 98.4 F | WEIGHT: 201.2 LBS

## 2018-12-13 PROCEDURE — 76010000149 HC OR TIME 1 TO 1.5 HR: Performed by: SURGERY

## 2018-12-13 PROCEDURE — 74011250636 HC RX REV CODE- 250/636: Performed by: ANESTHESIOLOGY

## 2018-12-13 PROCEDURE — 77030002986 HC SUT PROL J&J -A: Performed by: SURGERY

## 2018-12-13 PROCEDURE — 71045 X-RAY EXAM CHEST 1 VIEW: CPT

## 2018-12-13 PROCEDURE — C1788 PORT, INDWELLING, IMP: HCPCS | Performed by: SURGERY

## 2018-12-13 PROCEDURE — 74011250636 HC RX REV CODE- 250/636

## 2018-12-13 PROCEDURE — 76210000006 HC OR PH I REC 0.5 TO 1 HR: Performed by: SURGERY

## 2018-12-13 PROCEDURE — 74011250636 HC RX REV CODE- 250/636: Performed by: SURGERY

## 2018-12-13 PROCEDURE — 74011000250 HC RX REV CODE- 250: Performed by: SURGERY

## 2018-12-13 PROCEDURE — 77030031139 HC SUT VCRL2 J&J -A: Performed by: SURGERY

## 2018-12-13 PROCEDURE — 77030020782 HC GWN BAIR PAWS FLX 3M -B: Performed by: NURSE ANESTHETIST, CERTIFIED REGISTERED

## 2018-12-13 PROCEDURE — 76210000020 HC REC RM PH II FIRST 0.5 HR: Performed by: SURGERY

## 2018-12-13 PROCEDURE — 76060000033 HC ANESTHESIA 1 TO 1.5 HR: Performed by: SURGERY

## 2018-12-13 PROCEDURE — 74011250637 HC RX REV CODE- 250/637: Performed by: ANESTHESIOLOGY

## 2018-12-13 PROCEDURE — 77030018673: Performed by: SURGERY

## 2018-12-13 RX ORDER — FENTANYL CITRATE 50 UG/ML
INJECTION, SOLUTION INTRAMUSCULAR; INTRAVENOUS AS NEEDED
Status: DISCONTINUED | OUTPATIENT
Start: 2018-12-13 | End: 2018-12-13 | Stop reason: HOSPADM

## 2018-12-13 RX ORDER — SODIUM CHLORIDE, SODIUM LACTATE, POTASSIUM CHLORIDE, CALCIUM CHLORIDE 600; 310; 30; 20 MG/100ML; MG/100ML; MG/100ML; MG/100ML
100 INJECTION, SOLUTION INTRAVENOUS CONTINUOUS
Status: DISCONTINUED | OUTPATIENT
Start: 2018-12-13 | End: 2018-12-13 | Stop reason: HOSPADM

## 2018-12-13 RX ORDER — CEFAZOLIN SODIUM/WATER 2 G/20 ML
2 SYRINGE (ML) INTRAVENOUS ONCE
Status: DISCONTINUED | OUTPATIENT
Start: 2018-12-13 | End: 2018-12-13 | Stop reason: HOSPADM

## 2018-12-13 RX ORDER — SODIUM CHLORIDE 0.9 % (FLUSH) 0.9 %
5-10 SYRINGE (ML) INJECTION EVERY 8 HOURS
Status: DISCONTINUED | OUTPATIENT
Start: 2018-12-13 | End: 2018-12-13 | Stop reason: HOSPADM

## 2018-12-13 RX ORDER — SODIUM CHLORIDE 9 MG/ML
25 INJECTION, SOLUTION INTRAVENOUS CONTINUOUS
Status: DISCONTINUED | OUTPATIENT
Start: 2018-12-13 | End: 2018-12-13 | Stop reason: HOSPADM

## 2018-12-13 RX ORDER — SODIUM CHLORIDE 0.9 % (FLUSH) 0.9 %
5-10 SYRINGE (ML) INJECTION AS NEEDED
Status: DISCONTINUED | OUTPATIENT
Start: 2018-12-13 | End: 2018-12-13 | Stop reason: HOSPADM

## 2018-12-13 RX ORDER — LIDOCAINE HYDROCHLORIDE AND EPINEPHRINE 20; 5 MG/ML; UG/ML
INJECTION, SOLUTION EPIDURAL; INFILTRATION; INTRACAUDAL; PERINEURAL AS NEEDED
Status: DISCONTINUED | OUTPATIENT
Start: 2018-12-13 | End: 2018-12-13 | Stop reason: HOSPADM

## 2018-12-13 RX ORDER — MIDAZOLAM HYDROCHLORIDE 1 MG/ML
2 INJECTION, SOLUTION INTRAMUSCULAR; INTRAVENOUS
Status: DISCONTINUED | OUTPATIENT
Start: 2018-12-13 | End: 2018-12-13 | Stop reason: HOSPADM

## 2018-12-13 RX ORDER — PROPOFOL 10 MG/ML
INJECTION, EMULSION INTRAVENOUS
Status: DISCONTINUED | OUTPATIENT
Start: 2018-12-13 | End: 2018-12-13 | Stop reason: HOSPADM

## 2018-12-13 RX ORDER — LIDOCAINE HYDROCHLORIDE 10 MG/ML
0.1 INJECTION INFILTRATION; PERINEURAL AS NEEDED
Status: DISCONTINUED | OUTPATIENT
Start: 2018-12-13 | End: 2018-12-13 | Stop reason: HOSPADM

## 2018-12-13 RX ORDER — BUPIVACAINE HYDROCHLORIDE AND EPINEPHRINE 5; 5 MG/ML; UG/ML
INJECTION, SOLUTION EPIDURAL; INTRACAUDAL; PERINEURAL AS NEEDED
Status: DISCONTINUED | OUTPATIENT
Start: 2018-12-13 | End: 2018-12-13 | Stop reason: HOSPADM

## 2018-12-13 RX ORDER — FENTANYL CITRATE 50 UG/ML
100 INJECTION, SOLUTION INTRAMUSCULAR; INTRAVENOUS ONCE
Status: DISCONTINUED | OUTPATIENT
Start: 2018-12-13 | End: 2018-12-13 | Stop reason: HOSPADM

## 2018-12-13 RX ORDER — NALOXONE HYDROCHLORIDE 0.4 MG/ML
0.1 INJECTION, SOLUTION INTRAMUSCULAR; INTRAVENOUS; SUBCUTANEOUS AS NEEDED
Status: DISCONTINUED | OUTPATIENT
Start: 2018-12-13 | End: 2018-12-13 | Stop reason: HOSPADM

## 2018-12-13 RX ORDER — HYDROMORPHONE HYDROCHLORIDE 2 MG/ML
0.5 INJECTION, SOLUTION INTRAMUSCULAR; INTRAVENOUS; SUBCUTANEOUS
Status: DISCONTINUED | OUTPATIENT
Start: 2018-12-13 | End: 2018-12-13 | Stop reason: HOSPADM

## 2018-12-13 RX ORDER — LIDOCAINE HYDROCHLORIDE 20 MG/ML
INJECTION, SOLUTION EPIDURAL; INFILTRATION; INTRACAUDAL; PERINEURAL AS NEEDED
Status: DISCONTINUED | OUTPATIENT
Start: 2018-12-13 | End: 2018-12-13 | Stop reason: HOSPADM

## 2018-12-13 RX ORDER — ADHESIVE BANDAGE
30 BANDAGE TOPICAL 2 TIMES DAILY
COMMUNITY
End: 2019-01-07

## 2018-12-13 RX ORDER — HEPARIN 100 UNIT/ML
SYRINGE INTRAVENOUS AS NEEDED
Status: DISCONTINUED | OUTPATIENT
Start: 2018-12-13 | End: 2018-12-13 | Stop reason: HOSPADM

## 2018-12-13 RX ORDER — OXYCODONE HYDROCHLORIDE 5 MG/1
5 TABLET ORAL
Status: DISCONTINUED | OUTPATIENT
Start: 2018-12-13 | End: 2018-12-13 | Stop reason: HOSPADM

## 2018-12-13 RX ORDER — FAMOTIDINE 20 MG/1
20 TABLET, FILM COATED ORAL ONCE
Status: COMPLETED | OUTPATIENT
Start: 2018-12-13 | End: 2018-12-13

## 2018-12-13 RX ORDER — HYDROCODONE BITARTRATE AND ACETAMINOPHEN 7.5; 325 MG/1; MG/1
1 TABLET ORAL AS NEEDED
Status: DISCONTINUED | OUTPATIENT
Start: 2018-12-13 | End: 2018-12-13 | Stop reason: HOSPADM

## 2018-12-13 RX ADMIN — PROPOFOL 300 MCG/KG/MIN: 10 INJECTION, EMULSION INTRAVENOUS at 13:44

## 2018-12-13 RX ADMIN — FENTANYL CITRATE 50 MCG: 50 INJECTION, SOLUTION INTRAMUSCULAR; INTRAVENOUS at 13:44

## 2018-12-13 RX ADMIN — SODIUM CHLORIDE, SODIUM LACTATE, POTASSIUM CHLORIDE, AND CALCIUM CHLORIDE 100 ML/HR: 600; 310; 30; 20 INJECTION, SOLUTION INTRAVENOUS at 11:56

## 2018-12-13 RX ADMIN — FENTANYL CITRATE 25 MCG: 50 INJECTION, SOLUTION INTRAMUSCULAR; INTRAVENOUS at 14:01

## 2018-12-13 RX ADMIN — FAMOTIDINE 20 MG: 20 TABLET ORAL at 11:54

## 2018-12-13 RX ADMIN — FENTANYL CITRATE 25 MCG: 50 INJECTION, SOLUTION INTRAMUSCULAR; INTRAVENOUS at 14:05

## 2018-12-13 RX ADMIN — LIDOCAINE HYDROCHLORIDE 100 MG: 20 INJECTION, SOLUTION EPIDURAL; INFILTRATION; INTRACAUDAL; PERINEURAL at 13:44

## 2018-12-13 NOTE — DISCHARGE INSTRUCTIONS
Remove plastic dressing and gauze after 48 hours, leave sterile strips on for 7-10 days, OK to shower  Follow up with oncology as planned    ACTIVITY  · As tolerated and as directed by your doctor. · Bathe or shower as directed by your doctor. DIET  · Clear liquids until no nausea or vomiting; then light diet for the first day. · Advance to regular diet on second day, unless your doctor orders otherwise. · If nausea and vomiting continues, call your doctor. PAIN  · Take pain medication as directed by your doctor. · Call your doctor if pain is NOT relieved by medication. · DO NOT take aspirin of blood thinners unless directed by your doctor. CALL YOUR DOCTOR IF   · Excessive bleeding that does not stop after holding pressure over the area  · Temperature of 101 degrees F or above  · Excessive redness, swelling or bruising, and/ or green or yellow, smelly discharge from incision    AFTER ANESTHESIA   · For the first 24 hours: DO NOT Drive, Drink alcoholic beverages, or Make important decisions. · Be aware of dizziness following anesthesia and while taking pain medication. DISCHARGE SUMMARY from Nurse    PATIENT INSTRUCTIONS:    After general anesthesia or intravenous sedation, for 24 hours or while taking prescription Narcotics:  · Limit your activities  · Do not drive and operate hazardous machinery  · Do not make important personal or business decisions  · Do  not drink alcoholic beverages  · If you have not urinated within 8 hours after discharge, please contact your surgeon on call. *  Please give a list of your current medications to your Primary Care Provider. *  Please update this list whenever your medications are discontinued, doses are      changed, or new medications (including over-the-counter products) are added. *  Please carry medication information at all times in case of emergency situations.       These are general instructions for a healthy lifestyle:    No smoking/ No tobacco products/ Avoid exposure to second hand smoke    Surgeon General's Warning:  Quitting smoking now greatly reduces serious risk to your health. Obesity, smoking, and sedentary lifestyle greatly increases your risk for illness    A healthy diet, regular physical exercise & weight monitoring are important for maintaining a healthy lifestyle    You may be retaining fluid if you have a history of heart failure or if you experience any of the following symptoms:  Weight gain of 3 pounds or more overnight or 5 pounds in a week, increased swelling in our hands or feet or shortness of breath while lying flat in bed. Please call your doctor as soon as you notice any of these symptoms; do not wait until your next office visit. Recognize signs and symptoms of STROKE:    F-face looks uneven    A-arms unable to move or move unevenly    S-speech slurred or non-existent    T-time-call 911 as soon as signs and symptoms begin-DO NOT go       Back to bed or wait to see if you get better-TIME IS BRAIN.

## 2018-12-13 NOTE — H&P
H&P/Consult Note/Progress Note/Office Note:   Emre Schroeder  MRN: [id number]  :1960  Age:58 y.o.    HPI: Emre Schroeder is a 66 y.o. female who is newly diagnosed locally advanced pancreatic cancer, needs a port for chemo.            Past Medical History:   Diagnosis Date    Cancer [facility]) Dx 2018    pancreaitic cancer    Hypercholesterolemia     Hypertension     managed with meds    Pancreatic mass      Past Surgical History:   Procedure Laterality Date    ABDOMEN SURGERY PROC UNLISTED      Exp Lap, resection pancreatitc mass    HX CHOLECYSTECTOMY      HX GYN      JULIO C BSO    HX HEENT      jaw surgery     Current Facility-Administered Medications   Medication Dose Route Frequency    lidocaine (XYLOCAINE) 10 mg/mL (1 %) injection 0.1 mL  0.1 mL SubCUTAneous PRN    lactated Ringers infusion  100 mL/hr IntraVENous CONTINUOUS    0.9% sodium chloride infusion  25 mL/hr IntraVENous CONTINUOUS    sodium chloride (NS) flush 5-10 mL  5-10 mL IntraVENous Q8H    sodium chloride (NS) flush 5-10 mL  5-10 mL IntraVENous PRN    fentaNYL citrate (PF) injection 100 mcg  100 mcg IntraVENous ONCE    midazolam (VERSED) injection 2 mg  2 mg IntraVENous ONCE PRN    sodium chloride (NS) flush 5-10 mL  5-10 mL IntraVENous Q8H    sodium chloride (NS) flush 5-10 mL  5-10 mL IntraVENous PRN    ceFAZolin (ANCEF) 2 g/20 mL in sterile water IV syringe  2 g IntraVENous ONCE    sodium chloride (NS) flush 5-10 mL  5-10 mL IntraVENous Q8H    sodium chloride (NS) flush 5-10 mL  5-10 mL IntraVENous PRN     Penicillins  Social History     Socioeconomic History    Marital status:      Spouse name: Not on file    Number of children: Not on file    Years of education: Not on file    Highest education level: Not on file   Tobacco Use    Smoking status: Former Smoker     Packs/day: 1.00     Years: 20.00     Pack years: 20.00     Last attempt to quit: 2018     Years since quittin.0    Smokeless tobacco: Never Used   Substance and Sexual Activity    Alcohol use: No     Alcohol/week: 0.0 oz     Frequency: Never    Drug use: No     Social History     Tobacco Use   Smoking Status Former Smoker    Packs/day: 1.00    Years: 20.00    Pack years: 20.00    Last attempt to quit: 2018    Years since quittin.0   Smokeless Tobacco Never Used     Family History   Problem Relation Age of Onset    Elevated Lipids Mother     Hypertension Mother     Heart Disease Mother     Elevated Lipids Father     Hypertension Father     Heart Disease Father      ROS: The patient has no difficulty with chest pain or shortness of breath. No fever or chills. Comprehensive review of systems was otherwise unremarkable except as noted above. Physical Exam:   Visit Vitals  /76 (BP 1 Location: Right arm, BP Patient Position: At rest)   Pulse 82   Temp 97.7 °F (36.5 °C)   Resp 16   Ht 5' 2\" (1.575 m)   Wt 201 lb 3.2 oz (91.3 kg)   SpO2 99%   BMI 36.80 kg/m²     Vitals:    18 1109   BP: 147/76   Pulse: 82   Resp: 16   Temp: 97.7 °F (36.5 °C)   SpO2: 99%   Weight: 201 lb 3.2 oz (91.3 kg)   Height: 5' 2\" (1.575 m)     No intake/output data recorded. No intake/output data recorded. Constitutional: Alert, oriented, cooperative patient in no acute distress; appears stated age    Eyes:Sclera are clear. EOMs intact  ENMT: no external lesions gross hearing normal; no obvious neck masses, no ear or lip lesions, nares normal  CV: RRR. Normal perfusion  Resp: No JVD. Breathing is  non-labored; no audible wheezing. GI: soft and non-distended     Musculoskeletal: unremarkable with normal function. No embolic signs or cyanosis.    Neuro:  Oriented; moves all 4; no focal deficits  Psychiatric: normal affect and mood, no memory impairment    Recent vitals (if inpt):  Patient Vitals for the past 24 hrs:   BP Temp Pulse Resp SpO2 Height Weight   18 1109 147/76 97.7 °F (36.5 °C) 82 16 99 % 5' 2\" (1.575 m) 201 lb 3.2 oz (91.3 kg)       Labs:  No results for input(s): WBC, HGB, PLT, NA, K, CL, CO2, BUN, CREA, GLU, PTP, INR, APTT, TBIL, TBILI, CBIL, SGOT, GPT, ALT, AP, AML, AML, LPSE, LCAD, NH4, TROPT, TROIQ, PCO2, PO2, HCO3, HGBEXT, PLTEXT in the last 72 hours. No lab exists for component:  PH, INREXT    Lab Results   Component Value Date/Time    WBC 14.7 (H) 11/27/2018 03:03 PM    HGB 11.0 (L) 11/27/2018 03:03 PM    PLATELET 8,613 ([place]) 73/23/0955 03:03 PM    Sodium 132 (L) 11/27/2018 03:03 PM    Potassium 4.6 11/27/2018 03:03 PM    Chloride 97 (L) 11/27/2018 03:03 PM    CO2 28 11/27/2018 03:03 PM    BUN 10 11/27/2018 03:03 PM    Creatinine 0.82 11/27/2018 03:03 PM    Glucose 107 (H) 11/27/2018 03:03 PM    INR 1.1 11/09/2018 03:30 PM    Bilirubin, total 0.4 11/09/2018 03:30 PM    AST (SGOT) 17 11/09/2018 03:30 PM    ALT (SGPT) 26 11/09/2018 03:30 PM    Alk. phosphatase 106 11/09/2018 03:30 PM    Amylase 59 10/31/2018 09:13 AM    Lipase 168 (H) 10/31/2018 09:13 AM         I reviewed recent labs, recent radiologic studies, and pertinent records including other doctor notes if needed. I independently reviewed radiology images for studies I described above or studies I have ordered. Admission date (for inpatients): 12/13/2018   Day of Surgery  Procedure(s): INFUSAPORT INSERTION    ASSESSMENT/PLAN:  Problem List  Date Reviewed: 12/11/2018          Codes Class Noted    Severe obesity ([facility] ICD-10-CM: E66.01  ICD-9-CM: 278.01  12/6/2018        Malignant neoplasm of tail of pancreas ([facility] ICD-10-CM: C25.2  ICD-9-CM: 157.2  12/6/2018        Mass of pancreas ICD-10-CM: K86.9  ICD-9-CM: 577.9  11/12/2018        Acute left-sided low back pain with left-sided sciatica ICD-10-CM: M54.42  ICD-9-CM: 724.2, 724.3  8/12/2016        Hypercholesterolemia ICD-10-CM: E78.00  ICD-9-CM: 272.0  Unknown        Hypertension ICD-10-CM: I10  ICD-9-CM: 401.9  Unknown            Active Problems:    * No active hospital problems.  *     I have independently seen the patient and obtained history and independently examined the patient. I have also reviewed data/labs and developed the above plan of care.      Place port       Signed:  Ghazal Claros MD,  FACS

## 2018-12-13 NOTE — ANESTHESIA PREPROCEDURE EVALUATION
Anesthetic History               Review of Systems / Medical History  Patient summary reviewed    Pulmonary                   Neuro/Psych              Cardiovascular    Hypertension                   GI/Hepatic/Renal                Endo/Other        Cancer (Pancreatic)     Other Findings   Comments: Pancreatic mass          Physical Exam    Airway  Mallampati: II  TM Distance: > 6 cm  Neck ROM: normal range of motion   Mouth opening: Normal     Cardiovascular  Regular rate and rhythm,  S1 and S2 normal,  no murmur, click, rub, or gallop             Dental  No notable dental hx       Pulmonary  Breath sounds clear to auscultation               Abdominal         Other Findings            Anesthetic Plan    ASA: 2  Anesthesia type: total IV anesthesia            Anesthetic plan and risks discussed with: Patient

## 2018-12-13 NOTE — BRIEF OP NOTE
BRIEF OPERATIVE NOTE    Date of Procedure: 12/13/2018   Preoperative Diagnosis: [facility] ([facility] [C25.1]  Postoperative Diagnosis: Cancer of pancreas, body ([facility] [C25.1]    Procedure(s): INFUSAPORT INSERTION with fluoro  Surgeon(s) and Role:     Russ Cazares MD - Primary         Surgical Staff:  Circ-1: Andria Middleton RN  Radiology Technician: Marianna Romero Tech-1: Julian Davis  Event Time In Time Out   Incision Start  2:01 PM    Incision Close  2:35 PM      Anesthesia: MAC   Estimated Blood Loss: 10 ml  Specimens: * No specimens in log *   Findings: left subclavian was accessed, however, guide wire can only go up the left internal jugular vein, after numerous attempt with fluoro guidance, the wire won't go down to the heart, so switched to right subclavian vein. Complications: none  Implants:   Implant Name Type Inv.  Item Serial No.  Lot No. LRB No. Used Action   TiqIQ port    [employer] [id number] Left 1 Implanted

## 2018-12-13 NOTE — ANESTHESIA POSTPROCEDURE EVALUATION
Procedure(s): INFUSAPORT INSERTION.     Anesthesia Post Evaluation        Patient location during evaluation: PACU  Patient participation: complete - patient participated  Level of consciousness: awake and alert  Pain management: adequate  Airway patency: patent  Anesthetic complications: no  Cardiovascular status: acceptable  Respiratory status: acceptable  Hydration status: acceptable  Post anesthesia nausea and vomiting:  none      Visit Vitals  /62   Pulse 93   Temp 36.9 °C (98.5 °F)   Resp 21   Ht 5' 2\" (1.575 m)   Wt 91.3 kg (201 lb 3.2 oz)   SpO2 95%   BMI 36.80 kg/m²

## 2018-12-14 NOTE — OP NOTES
[facility] REPORT    Chad Lerner  MR#: [id number]  : 1960  ACCOUNT #: [de-identified]   DATE OF SERVICE: 2018    SURGEON:  Khari Tipton MD    ANESTHESIA:  MAC    PREOPERATIVE DIAGNOSIS:  Pancreatic cancer. POSTOPERATIVE DIAGNOSIS:  Pancreatic cancer. NAME OF PROCEDURE:  Port-A-Cath placement under fluoroscopy. INDICATIONS:  This is a 57-year-old female who presented with a locally advanced pancreatic cancer and she needs a port for chemotherapy. She understood the risks and benefits and agreed to proceed. FINDINGS:  We attempted left subclavian vein, which was easily accessed; however, guidewire will not go forward, is consistently going up to the left internal jugular vein. It has refused to go down to the heart. She may have some vascular variation or possible left brachiocephalic vein stenosis. So I switched to the right subclavian vein without any problem. PROCEDURE IN DETAIL:  After informed consent obtained, the patient brought into the operating room, left in supine position. Managed anesthesia care was administered. The patient was then prepped and draped in routine fashion. The left subclavian vein was accessed; however, guidewire would not go down to the heart, so we switched to the right subclavian vein. This accessed easily. Venous blood return was obtained. Guidewire was then advanced under the guidance of fluoroscopy followed by a dilator introducer stick and then the catheter was advanced along the introducer sheath under the guidance of fluoroscopy. At the same time, a pocket was made in the right upper chest.  The catheter was tunneled to the pocket, connected to a reservoir. Both reservoir and catheter were flushed with heparin solution 100 units per milliliter with good aspiration and good flush. Then, the port was sutured into place with a 2-0 Prolene stitch.   Incision closed with a 3-0 Vicryl stitch, on dermal layer 4-0 Vicryl stitch in subcuticular running fashion. The patient tolerated the procedure well and was transferred to recovery room in stable condition. All instrument and lap counts were correct. ESTIMATED BLOOD LOSS:  About 10 mL.     COMPLICATIONS:  none    IMPLANTS: as above      Hanna Colorado MD       BY / RN  D: 12/13/2018 14:53     T: 12/14/2018 05:47  JOB #: [id number]

## 2018-12-26 ENCOUNTER — PATIENT OUTREACH (OUTPATIENT)
Dept: CASE MANAGEMENT | Age: 58
End: 2018-12-26

## 2018-12-26 ENCOUNTER — HOSPITAL ENCOUNTER (OUTPATIENT)
Dept: INFUSION THERAPY | Age: 58
Discharge: HOME | End: 2018-12-26
Payer: COMMERCIAL

## 2018-12-26 ENCOUNTER — HOSPITAL ENCOUNTER (OUTPATIENT)
Dept: LAB | Age: 58
Discharge: HOME | End: 2018-12-26
Payer: COMMERCIAL

## 2018-12-26 VITALS
SYSTOLIC BLOOD PRESSURE: 146 MMHG | OXYGEN SATURATION: 97 % | RESPIRATION RATE: 18 BRPM | HEART RATE: 89 BPM | DIASTOLIC BLOOD PRESSURE: 78 MMHG

## 2018-12-26 DIAGNOSIS — C25.2 MALIGNANT NEOPLASM OF TAIL OF PANCREAS (HCC): Primary | ICD-10-CM

## 2018-12-26 DIAGNOSIS — C25.2 MALIGNANT NEOPLASM OF TAIL OF PANCREAS (HCC): ICD-10-CM

## 2018-12-26 LAB
ALBUMIN SERPL-MCNC: 3.2 G/DL (ref 3.5–5)
ALBUMIN/GLOB SERPL: 0.8 {RATIO} (ref 1.2–3.5)
ALP SERPL-CCNC: 124 U/L (ref 50–136)
ALT SERPL-CCNC: 30 U/L (ref 12–65)
ANION GAP SERPL CALC-SCNC: 8 MMOL/L (ref 7–16)
AST SERPL-CCNC: 43 U/L (ref 15–37)
BASOPHILS # BLD: 0.1 K/UL (ref 0–0.2)
BASOPHILS NFR BLD: 1 % (ref 0–2)
BILIRUB SERPL-MCNC: 0.2 MG/DL (ref 0.2–1.1)
BUN SERPL-MCNC: 12 MG/DL (ref 6–23)
CALCIUM SERPL-MCNC: 8.8 MG/DL (ref 8.3–10.4)
CANCER AG19-9 SERPL-ACNC: 1813.4 U/ML (ref 2–37)
CHLORIDE SERPL-SCNC: 100 MMOL/L (ref 98–107)
CO2 SERPL-SCNC: 31 MMOL/L (ref 21–32)
CREAT SERPL-MCNC: 0.81 MG/DL (ref 0.6–1)
DIFFERENTIAL METHOD BLD: ABNORMAL
EOSINOPHIL # BLD: 0.5 K/UL (ref 0–0.8)
EOSINOPHIL NFR BLD: 3 % (ref 0.5–7.8)
ERYTHROCYTE [DISTWIDTH] IN BLOOD BY AUTOMATED COUNT: 15.3 % (ref 11.9–14.6)
GLOBULIN SER CALC-MCNC: 3.9 G/DL (ref 2.3–3.5)
GLUCOSE SERPL-MCNC: 119 MG/DL (ref 65–100)
HCT VFR BLD AUTO: 39.6 % (ref 35.8–46.3)
HGB BLD-MCNC: 12.4 G/DL (ref 11.7–15.4)
IMM GRANULOCYTES # BLD: 0.1 K/UL (ref 0–0.5)
IMM GRANULOCYTES NFR BLD AUTO: 1 % (ref 0–5)
LYMPHOCYTES # BLD: 2.4 K/UL (ref 0.5–4.6)
LYMPHOCYTES NFR BLD: 16 % (ref 13–44)
MCH RBC QN AUTO: 28 PG (ref 26.1–32.9)
MCHC RBC AUTO-ENTMCNC: 31.3 G/DL (ref 31.4–35)
MCV RBC AUTO: 89.4 FL (ref 79.6–97.8)
MONOCYTES # BLD: 1 K/UL (ref 0.1–1.3)
MONOCYTES NFR BLD: 7 % (ref 4–12)
NEUTS SEG # BLD: 10.3 K/UL (ref 1.7–8.2)
NEUTS SEG NFR BLD: 72 % (ref 43–78)
NRBC # BLD: 0 K/UL (ref 0–0.2)
PLATELET # BLD AUTO: 788 K/UL (ref 150–450)
PMV BLD AUTO: 9 FL (ref 9.4–12.3)
POTASSIUM SERPL-SCNC: 3.3 MMOL/L (ref 3.5–5.1)
PROT SERPL-MCNC: 7.1 G/DL (ref 6.3–8.2)
RBC # BLD AUTO: 4.43 M/UL (ref 4.05–5.25)
SODIUM SERPL-SCNC: 139 MMOL/L (ref 136–145)
WBC # BLD AUTO: 14.4 K/UL (ref 4.3–11.1)

## 2018-12-26 PROCEDURE — 85025 COMPLETE CBC W/AUTO DIFF WBC: CPT

## 2018-12-26 PROCEDURE — 96375 TX/PRO/DX INJ NEW DRUG ADDON: CPT

## 2018-12-26 PROCEDURE — 74011250636 HC RX REV CODE- 250/636

## 2018-12-26 PROCEDURE — 74011250636 HC RX REV CODE- 250/636: Performed by: INTERNAL MEDICINE

## 2018-12-26 PROCEDURE — 74011000258 HC RX REV CODE- 258: Performed by: INTERNAL MEDICINE

## 2018-12-26 PROCEDURE — 96415 CHEMO IV INFUSION ADDL HR: CPT

## 2018-12-26 PROCEDURE — 96368 THER/DIAG CONCURRENT INF: CPT

## 2018-12-26 PROCEDURE — 96372 THER/PROPH/DIAG INJ SC/IM: CPT

## 2018-12-26 PROCEDURE — 74011000250 HC RX REV CODE- 250: Performed by: INTERNAL MEDICINE

## 2018-12-26 PROCEDURE — 86301 IMMUNOASSAY TUMOR CA 19-9: CPT

## 2018-12-26 PROCEDURE — 36593 DECLOT VASCULAR DEVICE: CPT

## 2018-12-26 PROCEDURE — 96417 CHEMO IV INFUS EACH ADDL SEQ: CPT

## 2018-12-26 PROCEDURE — 77030003560 HC NDL HUBR BARD -A

## 2018-12-26 PROCEDURE — 96413 CHEMO IV INFUSION 1 HR: CPT

## 2018-12-26 PROCEDURE — 96411 CHEMO IV PUSH ADDL DRUG: CPT

## 2018-12-26 PROCEDURE — 80053 COMPREHEN METABOLIC PANEL: CPT

## 2018-12-26 PROCEDURE — 36415 COLL VENOUS BLD VENIPUNCTURE: CPT

## 2018-12-26 RX ORDER — HYDROCORTISONE SODIUM SUCCINATE 100 MG/2ML
100 INJECTION, POWDER, FOR SOLUTION INTRAMUSCULAR; INTRAVENOUS AS NEEDED
Status: ACTIVE | OUTPATIENT
Start: 2018-12-26 | End: 2018-12-26

## 2018-12-26 RX ORDER — HEPARIN 100 UNIT/ML
300-500 SYRINGE INTRAVENOUS AS NEEDED
Status: CANCELLED
Start: 2018-12-28

## 2018-12-26 RX ORDER — ONDANSETRON 2 MG/ML
8 INJECTION INTRAMUSCULAR; INTRAVENOUS ONCE
Status: COMPLETED | OUTPATIENT
Start: 2018-12-26 | End: 2018-12-26

## 2018-12-26 RX ORDER — HEPARIN 100 UNIT/ML
300-500 SYRINGE INTRAVENOUS AS NEEDED
Status: CANCELLED
Start: 2018-12-26

## 2018-12-26 RX ORDER — ATROPINE SULFATE 0.4 MG/ML
0.4 INJECTION, SOLUTION ENDOTRACHEAL; INTRAMEDULLARY; INTRAMUSCULAR; INTRAVENOUS; SUBCUTANEOUS
Status: CANCELLED | OUTPATIENT
Start: 2018-12-26

## 2018-12-26 RX ORDER — ALBUTEROL SULFATE 0.83 MG/ML
2.5 SOLUTION RESPIRATORY (INHALATION) AS NEEDED
Status: CANCELLED
Start: 2018-12-26

## 2018-12-26 RX ORDER — SODIUM CHLORIDE 9 MG/ML
10 INJECTION INTRAMUSCULAR; INTRAVENOUS; SUBCUTANEOUS AS NEEDED
Status: CANCELLED | OUTPATIENT
Start: 2018-12-26

## 2018-12-26 RX ORDER — DIPHENHYDRAMINE HYDROCHLORIDE 50 MG/ML
50 INJECTION, SOLUTION INTRAMUSCULAR; INTRAVENOUS AS NEEDED
Status: CANCELLED
Start: 2018-12-26

## 2018-12-26 RX ORDER — FLUOROURACIL 50 MG/ML
400 INJECTION, SOLUTION INTRAVENOUS ONCE
Status: COMPLETED | OUTPATIENT
Start: 2018-12-26 | End: 2018-12-26

## 2018-12-26 RX ORDER — ONDANSETRON 2 MG/ML
8 INJECTION INTRAMUSCULAR; INTRAVENOUS AS NEEDED
Status: CANCELLED | OUTPATIENT
Start: 2018-12-26

## 2018-12-26 RX ORDER — ONDANSETRON 2 MG/ML
8 INJECTION INTRAMUSCULAR; INTRAVENOUS ONCE
Status: CANCELLED | OUTPATIENT
Start: 2018-12-26 | End: 2018-12-26

## 2018-12-26 RX ORDER — SODIUM CHLORIDE 0.9 % (FLUSH) 0.9 %
10 SYRINGE (ML) INJECTION AS NEEDED
Status: CANCELLED
Start: 2018-12-28

## 2018-12-26 RX ORDER — HYDROCORTISONE SODIUM SUCCINATE 100 MG/2ML
100 INJECTION, POWDER, FOR SOLUTION INTRAMUSCULAR; INTRAVENOUS AS NEEDED
Status: CANCELLED | OUTPATIENT
Start: 2018-12-26

## 2018-12-26 RX ORDER — SODIUM CHLORIDE 0.9 % (FLUSH) 0.9 %
10-30 SYRINGE (ML) INJECTION AS NEEDED
Status: DISCONTINUED | OUTPATIENT
Start: 2018-12-26 | End: 2018-12-30 | Stop reason: HOSPADM

## 2018-12-26 RX ORDER — DEXTROSE MONOHYDRATE 50 MG/ML
25 INJECTION, SOLUTION INTRAVENOUS CONTINUOUS
Status: CANCELLED
Start: 2018-12-26

## 2018-12-26 RX ORDER — ATROPINE SULFATE 0.4 MG/ML
0.4 INJECTION, SOLUTION ENDOTRACHEAL; INTRAMEDULLARY; INTRAMUSCULAR; INTRAVENOUS; SUBCUTANEOUS ONCE
Status: COMPLETED | OUTPATIENT
Start: 2018-12-26 | End: 2018-12-26

## 2018-12-26 RX ORDER — FLUOROURACIL 50 MG/ML
400 INJECTION, SOLUTION INTRAVENOUS ONCE
Status: CANCELLED
Start: 2018-12-26 | End: 2018-12-26

## 2018-12-26 RX ORDER — ACETAMINOPHEN 325 MG/1
650 TABLET ORAL AS NEEDED
Status: CANCELLED
Start: 2018-12-26

## 2018-12-26 RX ORDER — SODIUM CHLORIDE 0.9 % (FLUSH) 0.9 %
10 SYRINGE (ML) INJECTION AS NEEDED
Status: CANCELLED
Start: 2018-12-26

## 2018-12-26 RX ORDER — HEPARIN 100 UNIT/ML
500 SYRINGE INTRAVENOUS AS NEEDED
Status: DISCONTINUED | OUTPATIENT
Start: 2018-12-26 | End: 2018-12-26

## 2018-12-26 RX ORDER — EPINEPHRINE 1 MG/ML
0.3 INJECTION, SOLUTION, CONCENTRATE INTRAVENOUS AS NEEDED
Status: CANCELLED | OUTPATIENT
Start: 2018-12-26

## 2018-12-26 RX ORDER — DEXTROSE MONOHYDRATE 50 MG/ML
25 INJECTION, SOLUTION INTRAVENOUS CONTINUOUS
Status: ACTIVE | OUTPATIENT
Start: 2018-12-26 | End: 2018-12-26

## 2018-12-26 RX ORDER — ATROPINE SULFATE 0.4 MG/ML
0.4 INJECTION, SOLUTION ENDOTRACHEAL; INTRAMEDULLARY; INTRAMUSCULAR; INTRAVENOUS; SUBCUTANEOUS ONCE
Status: CANCELLED | OUTPATIENT
Start: 2018-12-26 | End: 2018-12-26

## 2018-12-26 RX ORDER — DIPHENHYDRAMINE HYDROCHLORIDE 50 MG/ML
50 INJECTION, SOLUTION INTRAMUSCULAR; INTRAVENOUS AS NEEDED
Status: ACTIVE | OUTPATIENT
Start: 2018-12-26 | End: 2018-12-26

## 2018-12-26 RX ORDER — SODIUM CHLORIDE 9 MG/ML
10 INJECTION INTRAMUSCULAR; INTRAVENOUS; SUBCUTANEOUS AS NEEDED
Status: CANCELLED | OUTPATIENT
Start: 2018-12-28

## 2018-12-26 RX ADMIN — OXALIPLATIN 174.5 MG: 5 INJECTION, SOLUTION, CONCENTRATE INTRAVENOUS at 11:15

## 2018-12-26 RX ADMIN — ONDANSETRON 8 MG: 2 INJECTION INTRAMUSCULAR; INTRAVENOUS at 10:35

## 2018-12-26 RX ADMIN — LEUCOVORIN CALCIUM 820 MG: 350 INJECTION, POWDER, LYOPHILIZED, FOR SOLUTION INTRAMUSCULAR; INTRAVENOUS at 13:20

## 2018-12-26 RX ADMIN — DEXAMETHASONE SODIUM PHOSPHATE 12 MG: 4 INJECTION, SOLUTION INTRAMUSCULAR; INTRAVENOUS at 10:20

## 2018-12-26 RX ADMIN — WATER 2 MG: 1 INJECTION INTRAMUSCULAR; INTRAVENOUS; SUBCUTANEOUS at 08:45

## 2018-12-26 RX ADMIN — Medication 30 ML: at 09:45

## 2018-12-26 RX ADMIN — FLUOROURACIL 820 MG: 50 INJECTION, SOLUTION INTRAVENOUS at 15:10

## 2018-12-26 RX ADMIN — ATROPINE SULFATE 0.4 MG: 0.4 INJECTION, SOLUTION INTRAMUSCULAR; INTRAVENOUS; SUBCUTANEOUS at 13:20

## 2018-12-26 RX ADMIN — IRINOTECAN HYDROCHLORIDE 360 MG: 20 INJECTION, SOLUTION INTRAVENOUS at 13:20

## 2018-12-26 RX ADMIN — DEXTROSE MONOHYDRATE 25 ML/HR: 5 INJECTION, SOLUTION INTRAVENOUS at 10:15

## 2018-12-26 NOTE — PROGRESS NOTES
Arrived to the [facility]. D1Ci FOLFIRINOX completed. Patient tolerated well  Any issues or concerns during appointment: Mild facial twitching noted @ end of infusion-patient stated that is was mild and she \"would be fine. \"  Patient aware of next infusion appointment on Friday,December 28th @ 1400  Discharged home ambulatory with

## 2018-12-26 NOTE — PROGRESS NOTES
12/26/18 saw pt today with Angela NP for pre chemo cycle 1 FOLFIRINOX. See pt instructions for symptom management. She is having some issues with acid reflux. Currently taking protonix BID. Instructed to add zantac or prilosec OTC if needed. Follow up in 1 week for tox check and 2 weeks for next cycle. Encouraged to call with any concerns. Navigation will continue to follow.

## 2018-12-28 ENCOUNTER — HOSPITAL ENCOUNTER (OUTPATIENT)
Dept: INFUSION THERAPY | Age: 58
Discharge: HOME | End: 2018-12-28
Payer: COMMERCIAL

## 2018-12-28 VITALS
RESPIRATION RATE: 16 BRPM | OXYGEN SATURATION: 100 % | HEART RATE: 95 BPM | TEMPERATURE: 98.1 F | DIASTOLIC BLOOD PRESSURE: 78 MMHG | SYSTOLIC BLOOD PRESSURE: 126 MMHG

## 2018-12-28 DIAGNOSIS — C25.2 MALIGNANT NEOPLASM OF TAIL OF PANCREAS (HCC): Primary | ICD-10-CM

## 2018-12-28 PROCEDURE — 96375 TX/PRO/DX INJ NEW DRUG ADDON: CPT

## 2018-12-28 PROCEDURE — 74011250636 HC RX REV CODE- 250/636: Performed by: INTERNAL MEDICINE

## 2018-12-28 PROCEDURE — 74011250636 HC RX REV CODE- 250/636: Performed by: NURSE PRACTITIONER

## 2018-12-28 PROCEDURE — 96374 THER/PROPH/DIAG INJ IV PUSH: CPT

## 2018-12-28 RX ORDER — SODIUM CHLORIDE 0.9 % (FLUSH) 0.9 %
10 SYRINGE (ML) INJECTION AS NEEDED
Status: ACTIVE | OUTPATIENT
Start: 2018-12-28 | End: 2018-12-29

## 2018-12-28 RX ORDER — ONDANSETRON 2 MG/ML
8 INJECTION INTRAMUSCULAR; INTRAVENOUS AS NEEDED
Status: DISPENSED | OUTPATIENT
Start: 2018-12-28 | End: 2018-12-29

## 2018-12-28 RX ORDER — DEXAMETHASONE SODIUM PHOSPHATE 100 MG/10ML
10 INJECTION INTRAMUSCULAR; INTRAVENOUS ONCE
Status: COMPLETED | OUTPATIENT
Start: 2018-12-28 | End: 2018-12-28

## 2018-12-28 RX ORDER — HEPARIN 100 UNIT/ML
300-500 SYRINGE INTRAVENOUS AS NEEDED
Status: DISPENSED | OUTPATIENT
Start: 2018-12-28 | End: 2018-12-29

## 2018-12-28 RX ADMIN — Medication 500 UNITS: at 14:40

## 2018-12-28 RX ADMIN — Medication 10 ML: at 14:40

## 2018-12-28 RX ADMIN — Medication 10 ML: at 14:24

## 2018-12-28 RX ADMIN — DEXAMETHASONE SODIUM PHOSPHATE 10 MG: 10 INJECTION INTRAMUSCULAR; INTRAVENOUS at 14:28

## 2018-12-28 RX ADMIN — Medication 10 ML: at 14:30

## 2018-12-28 RX ADMIN — ONDANSETRON 8 MG: 2 INJECTION INTRAMUSCULAR; INTRAVENOUS at 14:25

## 2018-12-28 NOTE — PROGRESS NOTES
Arrived to the [facility]. Pump dc completed. Patient tolerated well  C/o nausea/vomiting at home, did not want fluids, IV zofran and decadron order received for n/v. Any issues or concerns during appointment: none. Patient aware of next infusion appointment on 1/8/19 (date) at 0930 (time). Discharged ambulatory.

## 2019-01-03 ENCOUNTER — PATIENT OUTREACH (OUTPATIENT)
Dept: CASE MANAGEMENT | Age: 59
End: 2019-01-03

## 2019-01-03 ENCOUNTER — HOSPITAL ENCOUNTER (INPATIENT)
Age: 59
LOS: 4 days | Discharge: HOME | DRG: 699 | End: 2019-01-07
Attending: EMERGENCY MEDICINE | Admitting: FAMILY MEDICINE
Payer: COMMERCIAL

## 2019-01-03 ENCOUNTER — HOSPITAL ENCOUNTER (OUTPATIENT)
Dept: LAB | Age: 59
Discharge: HOME | End: 2019-01-03
Payer: COMMERCIAL

## 2019-01-03 ENCOUNTER — HOSPITAL ENCOUNTER (OUTPATIENT)
Dept: INFUSION THERAPY | Age: 59
Discharge: HOME | End: 2019-01-03
Payer: COMMERCIAL

## 2019-01-03 ENCOUNTER — APPOINTMENT (OUTPATIENT)
Dept: CT IMAGING | Age: 59
DRG: 699 | End: 2019-01-03
Attending: EMERGENCY MEDICINE
Payer: COMMERCIAL

## 2019-01-03 VITALS — DIASTOLIC BLOOD PRESSURE: 80 MMHG | OXYGEN SATURATION: 100 % | SYSTOLIC BLOOD PRESSURE: 140 MMHG | HEART RATE: 82 BPM

## 2019-01-03 DIAGNOSIS — N28.0 RENAL INFARCTION (HCC): Primary | ICD-10-CM

## 2019-01-03 DIAGNOSIS — N28.0 RENAL INFARCT (HCC): ICD-10-CM

## 2019-01-03 DIAGNOSIS — R11.2 NON-INTRACTABLE VOMITING WITH NAUSEA, UNSPECIFIED VOMITING TYPE: ICD-10-CM

## 2019-01-03 DIAGNOSIS — E87.6 HYPOKALEMIA: ICD-10-CM

## 2019-01-03 DIAGNOSIS — C25.2 MALIGNANT NEOPLASM OF TAIL OF PANCREAS (HCC): Chronic | ICD-10-CM

## 2019-01-03 DIAGNOSIS — C25.2 MALIGNANT NEOPLASM OF TAIL OF PANCREAS (HCC): Primary | ICD-10-CM

## 2019-01-03 DIAGNOSIS — C25.2 MALIGNANT NEOPLASM OF TAIL OF PANCREAS (HCC): ICD-10-CM

## 2019-01-03 DIAGNOSIS — R19.7 DIARRHEA, UNSPECIFIED TYPE: ICD-10-CM

## 2019-01-03 LAB
ALBUMIN SERPL-MCNC: 3.5 G/DL (ref 3.5–5)
ALBUMIN/GLOB SERPL: 0.8 {RATIO} (ref 1.2–3.5)
ALP SERPL-CCNC: 124 U/L (ref 50–136)
ALT SERPL-CCNC: 43 U/L (ref 12–65)
AMYLASE SERPL-CCNC: 27 U/L (ref 25–115)
ANION GAP SERPL CALC-SCNC: 4 MMOL/L (ref 7–16)
APTT PPP: 26 SEC (ref 24.7–39.8)
AST SERPL-CCNC: 41 U/L (ref 15–37)
BASOPHILS # BLD: 0.1 K/UL (ref 0–0.2)
BASOPHILS NFR BLD: 0 % (ref 0–2)
BILIRUB SERPL-MCNC: 0.4 MG/DL (ref 0.2–1.1)
BUN SERPL-MCNC: 12 MG/DL (ref 6–23)
CALCIUM SERPL-MCNC: 8.9 MG/DL (ref 8.3–10.4)
CHLORIDE SERPL-SCNC: 93 MMOL/L (ref 98–107)
CO2 SERPL-SCNC: 34 MMOL/L (ref 21–32)
CREAT SERPL-MCNC: 0.94 MG/DL (ref 0.6–1)
DIFFERENTIAL METHOD BLD: ABNORMAL
EOSINOPHIL # BLD: 0.2 K/UL (ref 0–0.8)
EOSINOPHIL NFR BLD: 2 % (ref 0.5–7.8)
ERYTHROCYTE [DISTWIDTH] IN BLOOD BY AUTOMATED COUNT: 15 % (ref 11.9–14.6)
GLOBULIN SER CALC-MCNC: 4.3 G/DL (ref 2.3–3.5)
GLUCOSE SERPL-MCNC: 135 MG/DL (ref 65–100)
HCT VFR BLD AUTO: 39.9 % (ref 35.8–46.3)
HGB BLD-MCNC: 13 G/DL (ref 11.7–15.4)
IMM GRANULOCYTES # BLD: 0.1 K/UL (ref 0–0.5)
IMM GRANULOCYTES NFR BLD AUTO: 1 % (ref 0–5)
LIPASE SERPL-CCNC: 208 U/L (ref 73–393)
LYMPHOCYTES # BLD: 1.8 K/UL (ref 0.5–4.6)
LYMPHOCYTES NFR BLD: 13 % (ref 13–44)
MCH RBC QN AUTO: 27.8 PG (ref 26.1–32.9)
MCHC RBC AUTO-ENTMCNC: 32.6 G/DL (ref 31.4–35)
MCV RBC AUTO: 85.4 FL (ref 79.6–97.8)
MONOCYTES # BLD: 0.5 K/UL (ref 0.1–1.3)
MONOCYTES NFR BLD: 3 % (ref 4–12)
NEUTS SEG # BLD: 11.4 K/UL (ref 1.7–8.2)
NEUTS SEG NFR BLD: 81 % (ref 43–78)
NRBC # BLD: 0 K/UL (ref 0–0.2)
PLATELET # BLD AUTO: 499 K/UL (ref 150–450)
PMV BLD AUTO: 9.4 FL (ref 9.4–12.3)
POTASSIUM SERPL-SCNC: 2.9 MMOL/L (ref 3.5–5.1)
PROT SERPL-MCNC: 7.8 G/DL (ref 6.3–8.2)
RBC # BLD AUTO: 4.67 M/UL (ref 4.05–5.25)
SODIUM SERPL-SCNC: 131 MMOL/L (ref 136–145)
WBC # BLD AUTO: 14 K/UL (ref 4.3–11.1)

## 2019-01-03 PROCEDURE — 99284 EMERGENCY DEPT VISIT MOD MDM: CPT | Performed by: EMERGENCY MEDICINE

## 2019-01-03 PROCEDURE — 74011250637 HC RX REV CODE- 250/637: Performed by: NURSE PRACTITIONER

## 2019-01-03 PROCEDURE — 83690 ASSAY OF LIPASE: CPT

## 2019-01-03 PROCEDURE — 96365 THER/PROPH/DIAG IV INF INIT: CPT

## 2019-01-03 PROCEDURE — 36415 COLL VENOUS BLD VENIPUNCTURE: CPT

## 2019-01-03 PROCEDURE — 74011250637 HC RX REV CODE- 250/637: Performed by: EMERGENCY MEDICINE

## 2019-01-03 PROCEDURE — 85730 THROMBOPLASTIN TIME PARTIAL: CPT

## 2019-01-03 PROCEDURE — 74011000258 HC RX REV CODE- 258: Performed by: EMERGENCY MEDICINE

## 2019-01-03 PROCEDURE — 74011250636 HC RX REV CODE- 250/636: Performed by: EMERGENCY MEDICINE

## 2019-01-03 PROCEDURE — 74177 CT ABD & PELVIS W/CONTRAST: CPT

## 2019-01-03 PROCEDURE — 99211 OFF/OP EST MAY X REQ PHY/QHP: CPT

## 2019-01-03 PROCEDURE — 74011250637 HC RX REV CODE- 250/637: Performed by: FAMILY MEDICINE

## 2019-01-03 PROCEDURE — 96375 TX/PRO/DX INJ NEW DRUG ADDON: CPT

## 2019-01-03 PROCEDURE — 96374 THER/PROPH/DIAG INJ IV PUSH: CPT | Performed by: EMERGENCY MEDICINE

## 2019-01-03 PROCEDURE — 81003 URINALYSIS AUTO W/O SCOPE: CPT | Performed by: EMERGENCY MEDICINE

## 2019-01-03 PROCEDURE — 74011250636 HC RX REV CODE- 250/636: Performed by: NURSE PRACTITIONER

## 2019-01-03 PROCEDURE — 74011636320 HC RX REV CODE- 636/320: Performed by: EMERGENCY MEDICINE

## 2019-01-03 PROCEDURE — 85025 COMPLETE CBC W/AUTO DIFF WBC: CPT

## 2019-01-03 PROCEDURE — 96375 TX/PRO/DX INJ NEW DRUG ADDON: CPT | Performed by: EMERGENCY MEDICINE

## 2019-01-03 PROCEDURE — 74011250636 HC RX REV CODE- 250/636: Performed by: FAMILY MEDICINE

## 2019-01-03 PROCEDURE — 80053 COMPREHEN METABOLIC PANEL: CPT

## 2019-01-03 PROCEDURE — 82150 ASSAY OF AMYLASE: CPT

## 2019-01-03 PROCEDURE — 74011250636 HC RX REV CODE- 250/636

## 2019-01-03 PROCEDURE — 65270000029 HC RM PRIVATE

## 2019-01-03 RX ORDER — MORPHINE SULFATE 2 MG/ML
INJECTION, SOLUTION INTRAMUSCULAR; INTRAVENOUS
Status: COMPLETED
Start: 2019-01-03 | End: 2019-01-03

## 2019-01-03 RX ORDER — ADHESIVE BANDAGE
30 BANDAGE TOPICAL 2 TIMES DAILY
Status: DISCONTINUED | OUTPATIENT
Start: 2019-01-03 | End: 2019-01-07 | Stop reason: HOSPADM

## 2019-01-03 RX ORDER — LORAZEPAM 2 MG/ML
INJECTION INTRAMUSCULAR
Status: COMPLETED
Start: 2019-01-03 | End: 2019-01-03

## 2019-01-03 RX ORDER — DIPHENHYDRAMINE HCL 25 MG
25 CAPSULE ORAL
Status: DISCONTINUED | OUTPATIENT
Start: 2019-01-03 | End: 2019-01-07 | Stop reason: HOSPADM

## 2019-01-03 RX ORDER — ASPIRIN 81 MG/1
81 TABLET ORAL DAILY
Status: DISCONTINUED | OUTPATIENT
Start: 2019-01-04 | End: 2019-01-07 | Stop reason: HOSPADM

## 2019-01-03 RX ORDER — HEPARIN SODIUM 5000 [USP'U]/100ML
18-36 INJECTION, SOLUTION INTRAVENOUS
Status: DISCONTINUED | OUTPATIENT
Start: 2019-01-03 | End: 2019-01-07 | Stop reason: HOSPADM

## 2019-01-03 RX ORDER — ATORVASTATIN CALCIUM 10 MG/1
20 TABLET, FILM COATED ORAL
Status: DISCONTINUED | OUTPATIENT
Start: 2019-01-03 | End: 2019-01-07 | Stop reason: HOSPADM

## 2019-01-03 RX ORDER — PROMETHAZINE HYDROCHLORIDE 25 MG/1
25 TABLET ORAL
Status: DISCONTINUED | OUTPATIENT
Start: 2019-01-03 | End: 2019-01-05

## 2019-01-03 RX ORDER — SODIUM CHLORIDE 0.9 % (FLUSH) 0.9 %
10 SYRINGE (ML) INJECTION AS NEEDED
Status: DISCONTINUED | OUTPATIENT
Start: 2019-01-03 | End: 2019-01-07 | Stop reason: HOSPADM

## 2019-01-03 RX ORDER — ONDANSETRON 2 MG/ML
4 INJECTION INTRAMUSCULAR; INTRAVENOUS
Status: COMPLETED | OUTPATIENT
Start: 2019-01-03 | End: 2019-01-03

## 2019-01-03 RX ORDER — HYDROMORPHONE HYDROCHLORIDE 1 MG/ML
1 INJECTION, SOLUTION INTRAMUSCULAR; INTRAVENOUS; SUBCUTANEOUS ONCE
Status: COMPLETED | OUTPATIENT
Start: 2019-01-03 | End: 2019-01-03

## 2019-01-03 RX ORDER — HEPARIN SODIUM 5000 [USP'U]/ML
5000 INJECTION, SOLUTION INTRAVENOUS; SUBCUTANEOUS
Status: COMPLETED | OUTPATIENT
Start: 2019-01-03 | End: 2019-01-03

## 2019-01-03 RX ORDER — LORAZEPAM 2 MG/ML
1 INJECTION INTRAMUSCULAR ONCE
Status: COMPLETED | OUTPATIENT
Start: 2019-01-03 | End: 2019-01-03

## 2019-01-03 RX ORDER — MORPHINE SULFATE 2 MG/ML
2 INJECTION, SOLUTION INTRAMUSCULAR; INTRAVENOUS ONCE
Status: COMPLETED | OUTPATIENT
Start: 2019-01-03 | End: 2019-01-03

## 2019-01-03 RX ORDER — LISINOPRIL 20 MG/1
20 TABLET ORAL DAILY
Status: DISCONTINUED | OUTPATIENT
Start: 2019-01-04 | End: 2019-01-07 | Stop reason: HOSPADM

## 2019-01-03 RX ORDER — LORAZEPAM 1 MG/1
2 TABLET ORAL
Status: COMPLETED | OUTPATIENT
Start: 2019-01-03 | End: 2019-01-03

## 2019-01-03 RX ORDER — SODIUM CHLORIDE 0.9 % (FLUSH) 0.9 %
5-10 SYRINGE (ML) INJECTION AS NEEDED
Status: DISCONTINUED | OUTPATIENT
Start: 2019-01-03 | End: 2019-01-07 | Stop reason: HOSPADM

## 2019-01-03 RX ORDER — SODIUM CHLORIDE 0.9 % (FLUSH) 0.9 %
5-10 SYRINGE (ML) INJECTION EVERY 8 HOURS
Status: DISCONTINUED | OUTPATIENT
Start: 2019-01-03 | End: 2019-01-07 | Stop reason: HOSPADM

## 2019-01-03 RX ORDER — ONDANSETRON 2 MG/ML
8 INJECTION INTRAMUSCULAR; INTRAVENOUS ONCE
Status: DISPENSED | OUTPATIENT
Start: 2019-01-03 | End: 2019-01-03

## 2019-01-03 RX ORDER — DEXAMETHASONE 0.5 MG/1
2 TABLET ORAL 2 TIMES DAILY WITH MEALS
Status: DISCONTINUED | OUTPATIENT
Start: 2019-01-04 | End: 2019-01-07 | Stop reason: HOSPADM

## 2019-01-03 RX ORDER — ONDANSETRON 2 MG/ML
4 INJECTION INTRAMUSCULAR; INTRAVENOUS
Status: DISCONTINUED | OUTPATIENT
Start: 2019-01-03 | End: 2019-01-05

## 2019-01-03 RX ORDER — DEXAMETHASONE SODIUM PHOSPHATE 100 MG/10ML
10 INJECTION INTRAMUSCULAR; INTRAVENOUS ONCE
Status: DISPENSED | OUTPATIENT
Start: 2019-01-03 | End: 2019-01-03

## 2019-01-03 RX ORDER — DEXAMETHASONE SODIUM PHOSPHATE 100 MG/10ML
10 INJECTION INTRAMUSCULAR; INTRAVENOUS ONCE
Status: COMPLETED | OUTPATIENT
Start: 2019-01-03 | End: 2019-01-03

## 2019-01-03 RX ORDER — OXYCODONE AND ACETAMINOPHEN 7.5; 325 MG/1; MG/1
1 TABLET ORAL
Status: DISCONTINUED | OUTPATIENT
Start: 2019-01-03 | End: 2019-01-07 | Stop reason: HOSPADM

## 2019-01-03 RX ORDER — MORPHINE SULFATE 2 MG/ML
INJECTION, SOLUTION INTRAMUSCULAR; INTRAVENOUS
Status: ACTIVE
Start: 2019-01-03 | End: 2019-01-04

## 2019-01-03 RX ORDER — PANTOPRAZOLE SODIUM 40 MG/1
40 TABLET, DELAYED RELEASE ORAL
Status: DISCONTINUED | OUTPATIENT
Start: 2019-01-04 | End: 2019-01-07 | Stop reason: HOSPADM

## 2019-01-03 RX ORDER — POTASSIUM CHLORIDE 29.8 MG/ML
20 INJECTION INTRAVENOUS ONCE
Status: COMPLETED | OUTPATIENT
Start: 2019-01-03 | End: 2019-01-03

## 2019-01-03 RX ORDER — HEPARIN 100 UNIT/ML
300 SYRINGE INTRAVENOUS AS NEEDED
Status: ACTIVE | OUTPATIENT
Start: 2019-01-03 | End: 2019-01-03

## 2019-01-03 RX ORDER — HYDROMORPHONE HYDROCHLORIDE 2 MG/ML
1 INJECTION, SOLUTION INTRAMUSCULAR; INTRAVENOUS; SUBCUTANEOUS ONCE
Status: COMPLETED | OUTPATIENT
Start: 2019-01-03 | End: 2019-01-03

## 2019-01-03 RX ORDER — POTASSIUM CHLORIDE 750 MG/1
40 TABLET, EXTENDED RELEASE ORAL
Status: DISPENSED | OUTPATIENT
Start: 2019-01-03 | End: 2019-01-03

## 2019-01-03 RX ORDER — SODIUM CHLORIDE 9 MG/ML
1000 INJECTION, SOLUTION INTRAVENOUS CONTINUOUS
Status: ACTIVE | OUTPATIENT
Start: 2019-01-03 | End: 2019-01-04

## 2019-01-03 RX ORDER — BISACODYL 5 MG
5 TABLET, DELAYED RELEASE (ENTERIC COATED) ORAL DAILY PRN
Status: DISCONTINUED | OUTPATIENT
Start: 2019-01-03 | End: 2019-01-07 | Stop reason: HOSPADM

## 2019-01-03 RX ORDER — LIDOCAINE AND PRILOCAINE 25; 25 MG/G; MG/G
CREAM TOPICAL AS NEEDED
Status: DISCONTINUED | OUTPATIENT
Start: 2019-01-03 | End: 2019-01-07 | Stop reason: HOSPADM

## 2019-01-03 RX ORDER — POTASSIUM CHLORIDE 750 MG/1
40 TABLET, EXTENDED RELEASE ORAL
Status: COMPLETED | OUTPATIENT
Start: 2019-01-03 | End: 2019-01-03

## 2019-01-03 RX ORDER — NALOXONE HYDROCHLORIDE 0.4 MG/ML
0.4 INJECTION, SOLUTION INTRAMUSCULAR; INTRAVENOUS; SUBCUTANEOUS AS NEEDED
Status: DISCONTINUED | OUTPATIENT
Start: 2019-01-03 | End: 2019-01-07 | Stop reason: HOSPADM

## 2019-01-03 RX ORDER — MORPHINE SULFATE 2 MG/ML
2 INJECTION, SOLUTION INTRAMUSCULAR; INTRAVENOUS
Status: DISCONTINUED | OUTPATIENT
Start: 2019-01-03 | End: 2019-01-07 | Stop reason: HOSPADM

## 2019-01-03 RX ORDER — FOSAPREPITANT 150 MG/5ML
150 INJECTION, POWDER, LYOPHILIZED, FOR SOLUTION INTRAVENOUS ONCE
Status: DISCONTINUED | OUTPATIENT
Start: 2019-01-03 | End: 2019-01-03

## 2019-01-03 RX ORDER — MORPHINE SULFATE 15 MG/1
30 TABLET ORAL
Status: DISCONTINUED | OUTPATIENT
Start: 2019-01-03 | End: 2019-01-07 | Stop reason: HOSPADM

## 2019-01-03 RX ORDER — ACETAMINOPHEN 325 MG/1
650 TABLET ORAL
Status: DISCONTINUED | OUTPATIENT
Start: 2019-01-03 | End: 2019-01-07 | Stop reason: HOSPADM

## 2019-01-03 RX ORDER — LORAZEPAM 1 MG/1
1 TABLET ORAL
Status: DISCONTINUED | OUTPATIENT
Start: 2019-01-03 | End: 2019-01-05

## 2019-01-03 RX ORDER — SODIUM CHLORIDE 0.9 % (FLUSH) 0.9 %
10 SYRINGE (ML) INJECTION
Status: COMPLETED | OUTPATIENT
Start: 2019-01-03 | End: 2019-01-03

## 2019-01-03 RX ORDER — ONDANSETRON 2 MG/ML
8 INJECTION INTRAMUSCULAR; INTRAVENOUS ONCE
Status: COMPLETED | OUTPATIENT
Start: 2019-01-03 | End: 2019-01-03

## 2019-01-03 RX ORDER — POTASSIUM CHLORIDE 14.9 MG/ML
20 INJECTION INTRAVENOUS
Status: COMPLETED | OUTPATIENT
Start: 2019-01-03 | End: 2019-01-04

## 2019-01-03 RX ORDER — LORAZEPAM 1 MG/1
2 TABLET ORAL
Status: DISCONTINUED | OUTPATIENT
Start: 2019-01-03 | End: 2019-01-07 | Stop reason: HOSPADM

## 2019-01-03 RX ADMIN — LORAZEPAM 1 MG: 2 INJECTION, SOLUTION INTRAMUSCULAR; INTRAVENOUS at 12:45

## 2019-01-03 RX ADMIN — HYDROMORPHONE HYDROCHLORIDE 1 MG: 1 INJECTION, SOLUTION INTRAMUSCULAR; INTRAVENOUS; SUBCUTANEOUS at 17:27

## 2019-01-03 RX ADMIN — HYDROMORPHONE HYDROCHLORIDE 1 MG: 1 INJECTION, SOLUTION INTRAMUSCULAR; INTRAVENOUS; SUBCUTANEOUS at 21:09

## 2019-01-03 RX ADMIN — MORPHINE SULFATE 30 MG: 30 TABLET ORAL at 23:00

## 2019-01-03 RX ADMIN — SODIUM CHLORIDE 1000 ML: 900 INJECTION, SOLUTION INTRAVENOUS at 11:42

## 2019-01-03 RX ADMIN — ONDANSETRON 8 MG: 2 INJECTION INTRAMUSCULAR; INTRAVENOUS at 11:59

## 2019-01-03 RX ADMIN — DEXAMETHASONE SODIUM PHOSPHATE 10 MG: 10 INJECTION INTRAMUSCULAR; INTRAVENOUS at 12:01

## 2019-01-03 RX ADMIN — HYDROMORPHONE HYDROCHLORIDE 1 MG: 2 INJECTION, SOLUTION INTRAMUSCULAR; INTRAVENOUS; SUBCUTANEOUS at 13:00

## 2019-01-03 RX ADMIN — SODIUM CHLORIDE 100 ML: 900 INJECTION, SOLUTION INTRAVENOUS at 16:12

## 2019-01-03 RX ADMIN — HEPARIN SODIUM 5000 UNITS: 5000 INJECTION INTRAVENOUS; SUBCUTANEOUS at 20:04

## 2019-01-03 RX ADMIN — HEPARIN SODIUM AND DEXTROSE 18 UNITS/KG/HR: 5000; 5 INJECTION INTRAVENOUS at 21:13

## 2019-01-03 RX ADMIN — POTASSIUM CHLORIDE 20 MEQ: 400 INJECTION, SOLUTION INTRAVENOUS at 11:42

## 2019-01-03 RX ADMIN — LORAZEPAM 1 MG: 2 INJECTION INTRAMUSCULAR at 12:45

## 2019-01-03 RX ADMIN — LORAZEPAM 2 MG: 1 TABLET ORAL at 21:08

## 2019-01-03 RX ADMIN — MORPHINE SULFATE 2 MG: 2 INJECTION, SOLUTION INTRAMUSCULAR; INTRAVENOUS at 12:31

## 2019-01-03 RX ADMIN — IOPAMIDOL 100 ML: 755 INJECTION, SOLUTION INTRAVENOUS at 16:12

## 2019-01-03 RX ADMIN — MORPHINE SULFATE 2 MG: 2 INJECTION, SOLUTION INTRAMUSCULAR; INTRAVENOUS at 12:50

## 2019-01-03 RX ADMIN — Medication 10 ML: at 16:12

## 2019-01-03 RX ADMIN — SODIUM CHLORIDE 1000 ML: 900 INJECTION, SOLUTION INTRAVENOUS at 16:48

## 2019-01-03 RX ADMIN — LORAZEPAM 1 MG: 2 INJECTION, SOLUTION INTRAMUSCULAR; INTRAVENOUS at 22:45

## 2019-01-03 RX ADMIN — POTASSIUM CHLORIDE 20 MEQ: 14.9 INJECTION, SOLUTION INTRAVENOUS at 22:04

## 2019-01-03 RX ADMIN — POTASSIUM CHLORIDE 40 MEQ: 10 TABLET, EXTENDED RELEASE ORAL at 11:58

## 2019-01-03 RX ADMIN — ONDANSETRON 4 MG: 2 INJECTION INTRAMUSCULAR; INTRAVENOUS at 17:27

## 2019-01-03 NOTE — PROGRESS NOTES
1/3/19 saw pt today with Harmeet Valencia NP for tox check post cycle 1 FOLFIRINOX. She is reporting N/V, poor PO intake, dehydration, etc.  She did not call to report symptoms. Discussed the importance of calling me to manage symptoms. Will get IVF and IV potassium today. Follow up next week as scheduled. Encouraged to call with any concerns. Navigation will continue to follow.

## 2019-01-03 NOTE — ED PROVIDER NOTES
Patient is currently under treatment for metastatic pancreatic cancer. She was at the infusion center today getting IV fluids,, was found to have low potassium. She received 40 mEq by mouth and approximately 10 mEq by IV when she had the sudden onset of right flank pain. She describes it as stabbing,, nonradiating pain  Which was severe in intensity. She denies similar pain in the past. She had a couple episodes of emesis. She was given pain medication at the site, which resolved her pain. She was sent here for further evaluation. She denies any urinary symptoms, denies any diarrhea. Elements of this note were created using speech recognition software. As such, errors of speech recognition may be present. Past Medical History:  
Diagnosis Date  Cancer [facility]) Dx 11/2018  
 pancreaitic cancer  Hypercholesterolemia  Hypertension   
 managed with meds  Pancreatic mass Past Surgical History:  
Procedure Laterality Date [address] UNLISTED Exp Lap, resection pancreatitc mass  HX CHOLECYSTECTOMY  HX GYN    
 JULIO C BSO  HX HEENT    
 jaw surgery Family History:  
Problem Relation Age of Onset  Elevated Lipids Mother  Hypertension Mother  Heart Disease Mother  Elevated Lipids Father  Hypertension Father  Heart Disease Father Social History Socioeconomic History  Marital status:  Spouse name: Not on file  Number of children: Not on file  Years of education: Not on file  Highest education level: Not on file Social Needs  Financial resource strain: Not on file  Food insecurity - worry: Not on file  Food insecurity - inability: Not on file  Transportation needs - medical: Not on file  Transportation needs - non-medical: Not on file Occupational History  Not on file Tobacco Use  Smoking status: Former Smoker Packs/day: 1.00 Years: 20.00 Pack years: 20.00 Last attempt to quit: 2018 Years since quittin.1  Smokeless tobacco: Never Used Substance and Sexual Activity  Alcohol use: No  
  Alcohol/week: 0.0 oz Frequency: Never  Drug use: No  
 Sexual activity: Not on file Other Topics Concern  Not on file Social History Narrative  Not on file ALLERGIES: Penicillins Review of Systems Constitutional: Negative for chills and fever. Gastrointestinal: Positive for abdominal pain, nausea and vomiting. All other systems reviewed and are negative. Vitals:  
 19 1356 BP: 143/88 Pulse: 84 Resp: 16 Temp: 97.8 °F (36.6 °C) SpO2: 97% Physical Exam  
Constitutional: She is oriented to person, place, and time. She appears well-developed and well-nourished. HENT:  
Head: Normocephalic and atraumatic. Eyes: Conjunctivae are normal. Pupils are equal, round, and reactive to light. Neck: Normal range of motion. Neck supple. Cardiovascular: Normal rate, regular rhythm and normal heart sounds. Pulmonary/Chest: Effort normal and breath sounds normal.  
Abdominal: Soft. Bowel sounds are normal.  
Musculoskeletal: She exhibits no edema or tenderness. Neurological: She is alert and oriented to person, place, and time. Skin: Skin is warm and dry. Psychiatric: She has a normal mood and affect. Her behavior is normal.  
Nursing note and vitals reviewed. MDM Number of Diagnoses or Management Options Renal infarction [facility]): new and requires workup Diagnosis management comments: 3:18 PM labs from the infusion center show a potassium of 2.9. However, she has received 40 mg by mouth and 10 mEq IV 
5:42 PM patient is having pain again, we will repeat her pain medication 6:11 PM CT scan shows likely renal infarct. I have paged oncology and urology 6:40 PM spoke with PA from oncology, discussed details of case. She states the patient will likely need anticoagulation and admission. Awaiting urology 6:51 PM Spoke with Dr Karina De Paz, discussed details of case. He recommends anticoagulation, though thrombectomy would be futile 7:41 PM poke with the hospitalist, to see patient for admission Amount and/or Complexity of Data Reviewed Clinical lab tests: ordered and reviewed Tests in the radiology section of CPT®: ordered and reviewed Decide to obtain previous medical records or to obtain history from someone other than the patient: yes Review and summarize past medical records: yes Discuss the patient with other providers: yes Risk of Complications, Morbidity, and/or Mortality Presenting problems: moderate Diagnostic procedures: moderate Management options: moderate Patient Progress Patient progress: improved Procedures

## 2019-01-03 NOTE — PROGRESS NOTES
Pt arrived ambulatory with , port previously accessed, NS, [id number], zofran 8mg, 40meq KCL  Po given, 20meq potassium started, pt stood up and was lowered to the floor by staff, complainng of right side flank pain, was diaphoretic and nauseated, rapid response called, 4mg morphone, 1mg ativan and 1mg dilaudid given IV with no relief, EMS arrived to transport pt to [facility] ER accompanied by spouse

## 2019-01-04 LAB
ANION GAP SERPL CALC-SCNC: 8 MMOL/L (ref 7–16)
APTT PPP: 100.1 SEC (ref 24.7–39.8)
APTT PPP: 102.8 SEC (ref 24.7–39.8)
APTT PPP: 124.5 SEC (ref 24.7–39.8)
APTT PPP: 88.3 SEC (ref 24.7–39.8)
BASOPHILS # BLD: 0 K/UL (ref 0–0.2)
BASOPHILS NFR BLD: 0 % (ref 0–2)
BUN SERPL-MCNC: 9 MG/DL (ref 6–23)
CALCIUM SERPL-MCNC: 8.1 MG/DL (ref 8.3–10.4)
CHLORIDE SERPL-SCNC: 101 MMOL/L (ref 98–107)
CO2 SERPL-SCNC: 26 MMOL/L (ref 21–32)
CREAT SERPL-MCNC: 0.77 MG/DL (ref 0.6–1)
DIFFERENTIAL METHOD BLD: ABNORMAL
EOSINOPHIL # BLD: 0 K/UL (ref 0–0.8)
EOSINOPHIL NFR BLD: 0 % (ref 0.5–7.8)
ERYTHROCYTE [DISTWIDTH] IN BLOOD BY AUTOMATED COUNT: 14.8 % (ref 11.9–14.6)
GLUCOSE SERPL-MCNC: 137 MG/DL (ref 65–100)
HCT VFR BLD AUTO: 32.8 % (ref 35.8–46.3)
HGB BLD-MCNC: 10.6 G/DL (ref 11.7–15.4)
IMM GRANULOCYTES # BLD: 0.1 K/UL (ref 0–0.5)
IMM GRANULOCYTES NFR BLD AUTO: 1 % (ref 0–5)
LYMPHOCYTES # BLD: 1.5 K/UL (ref 0.5–4.6)
LYMPHOCYTES NFR BLD: 12 % (ref 13–44)
MCH RBC QN AUTO: 28 PG (ref 26.1–32.9)
MCHC RBC AUTO-ENTMCNC: 32.3 G/DL (ref 31.4–35)
MCV RBC AUTO: 86.8 FL (ref 79.6–97.8)
MONOCYTES # BLD: 0.7 K/UL (ref 0.1–1.3)
MONOCYTES NFR BLD: 6 % (ref 4–12)
NEUTS SEG # BLD: 10.7 K/UL (ref 1.7–8.2)
NEUTS SEG NFR BLD: 82 % (ref 43–78)
NRBC # BLD: 0 K/UL (ref 0–0.2)
PLATELET # BLD AUTO: 404 K/UL (ref 150–450)
PMV BLD AUTO: 10.1 FL (ref 9.4–12.3)
POTASSIUM SERPL-SCNC: 4 MMOL/L (ref 3.5–5.1)
RBC # BLD AUTO: 3.78 M/UL (ref 4.05–5.2)
SODIUM SERPL-SCNC: 135 MMOL/L (ref 136–145)
WBC # BLD AUTO: 13.1 K/UL (ref 4.3–11.1)

## 2019-01-04 PROCEDURE — 65270000029 HC RM PRIVATE

## 2019-01-04 PROCEDURE — 74011250637 HC RX REV CODE- 250/637: Performed by: FAMILY MEDICINE

## 2019-01-04 PROCEDURE — 85730 THROMBOPLASTIN TIME PARTIAL: CPT

## 2019-01-04 PROCEDURE — 99223 1ST HOSP IP/OBS HIGH 75: CPT | Performed by: INTERNAL MEDICINE

## 2019-01-04 PROCEDURE — 80048 BASIC METABOLIC PNL TOTAL CA: CPT

## 2019-01-04 PROCEDURE — 85025 COMPLETE CBC W/AUTO DIFF WBC: CPT

## 2019-01-04 PROCEDURE — 74011250636 HC RX REV CODE- 250/636: Performed by: EMERGENCY MEDICINE

## 2019-01-04 PROCEDURE — 36415 COLL VENOUS BLD VENIPUNCTURE: CPT

## 2019-01-04 PROCEDURE — 74011250636 HC RX REV CODE- 250/636: Performed by: NURSE PRACTITIONER

## 2019-01-04 PROCEDURE — 77030020263 HC SOL INJ SOD CL0.9% LFCR 1000ML

## 2019-01-04 PROCEDURE — 74011250636 HC RX REV CODE- 250/636: Performed by: FAMILY MEDICINE

## 2019-01-04 PROCEDURE — 93306 TTE W/DOPPLER COMPLETE: CPT

## 2019-01-04 RX ORDER — LORAZEPAM 2 MG/ML
1 INJECTION INTRAMUSCULAR ONCE
Status: ACTIVE | OUTPATIENT
Start: 2019-01-04 | End: 2019-01-04

## 2019-01-04 RX ORDER — SODIUM CHLORIDE 9 MG/ML
50 INJECTION, SOLUTION INTRAVENOUS CONTINUOUS
Status: DISCONTINUED | OUTPATIENT
Start: 2019-01-04 | End: 2019-01-06

## 2019-01-04 RX ADMIN — Medication 5 ML: at 21:18

## 2019-01-04 RX ADMIN — SODIUM CHLORIDE 50 ML/HR: 900 INJECTION, SOLUTION INTRAVENOUS at 09:00

## 2019-01-04 RX ADMIN — OXYCODONE HYDROCHLORIDE AND ACETAMINOPHEN 1 TABLET: 7.5; 325 TABLET ORAL at 20:52

## 2019-01-04 RX ADMIN — HEPARIN SODIUM AND DEXTROSE 16 UNITS/KG/HR: 5000; 5 INJECTION INTRAVENOUS at 04:30

## 2019-01-04 RX ADMIN — DEXAMETHASONE 2 MG: 0.5 TABLET ORAL at 16:53

## 2019-01-04 RX ADMIN — LISINOPRIL 20 MG: 20 TABLET ORAL at 08:11

## 2019-01-04 RX ADMIN — MAGNESIUM HYDROXIDE 30 ML: 400 SUSPENSION ORAL at 08:10

## 2019-01-04 RX ADMIN — SODIUM CHLORIDE 1000 ML: 900 INJECTION, SOLUTION INTRAVENOUS at 01:14

## 2019-01-04 RX ADMIN — HEPARIN SODIUM AND DEXTROSE 16 UNITS/KG/HR: 5000; 5 INJECTION INTRAVENOUS at 16:04

## 2019-01-04 RX ADMIN — PANTOPRAZOLE SODIUM 40 MG: 40 TABLET, DELAYED RELEASE ORAL at 16:04

## 2019-01-04 RX ADMIN — LORAZEPAM 1 MG: 1 TABLET ORAL at 20:52

## 2019-01-04 RX ADMIN — DEXAMETHASONE 2 MG: 0.5 TABLET ORAL at 08:11

## 2019-01-04 RX ADMIN — OXYCODONE HYDROCHLORIDE AND ACETAMINOPHEN 1 TABLET: 7.5; 325 TABLET ORAL at 14:28

## 2019-01-04 RX ADMIN — POTASSIUM CHLORIDE 20 MEQ: 14.9 INJECTION, SOLUTION INTRAVENOUS at 01:12

## 2019-01-04 RX ADMIN — PANTOPRAZOLE SODIUM 40 MG: 40 TABLET, DELAYED RELEASE ORAL at 08:11

## 2019-01-04 RX ADMIN — ASPIRIN 81 MG: 81 TABLET, COATED ORAL at 08:11

## 2019-01-04 RX ADMIN — HEPARIN SODIUM AND DEXTROSE 16 UNITS/KG/HR: 5000; 5 INJECTION INTRAVENOUS at 12:14

## 2019-01-04 NOTE — PROGRESS NOTES
PTT 88.3. No change in rate per protocol. This is 3rd consecutive WNL, will order PTT for the next 24 hrs per protocol.

## 2019-01-04 NOTE — ED NOTES
TRANSFER - OUT REPORT: 
 
Verbal report given to Georgette Larson RN on Deloris Colon  being transferred to surgical  for routine progression of care Report consisted of patients Situation, Background, Assessment and  
Recommendations(SBAR). Information from the following report(s) SBAR, ED Summary and MAR was reviewed with the receiving nurse. Lines:  
Venous Access Device Port 12/13/18 Upper chest (subclavicular area, right (Active) Central Line Being Utilized Yes 1/4/2019  7:29 AM  
Site Assessment Clean, dry, & intact 1/4/2019  7:29 AM  
Dressing Status Clean, dry, & intact 1/4/2019  7:29 AM  
Positive Blood Return (Medial Site) Yes 1/3/2019 11:51 AM  
Action Taken (Medial Site) Flushed 1/3/2019  1:09 PM  
   
Peripheral IV 01/03/19 Left Antecubital (Active) Site Assessment Clean, dry, & intact 1/4/2019  8:26 AM  
Phlebitis Assessment 0 1/4/2019  8:26 AM  
Infiltration Assessment 0 1/4/2019  8:26 AM  
Dressing Status Clean, dry, & intact 1/4/2019  8:26 AM  
  
 
Opportunity for questions and clarification was provided.    
 
Patient transported with: 
 RN

## 2019-01-04 NOTE — PROGRESS NOTES
Initial visit to assess pt's spiritual needs. Ministry of presence & prayer to demonstrate caring & concern, convey emotional & spiritual support.  
 
elise Arteaga MDiv,ThM,PhD

## 2019-01-04 NOTE — PROGRESS NOTES
[facility] Hematology & Oncology Inpatient Hematology / Oncology Consult NoteReason for Consult:  Renal infarct [facility]) Referring Physician:  Maty Ruiz MD 
History of Present Illness: 
Ms. Glen Rose is a 66 y.o. female admitted on 1/3/2019 with a primary diagnosis of renal infarct. Ms. Glen Rose is a 66 y.o. female who presented to the ER from infusion center 1/3/2019 due to sudden onset of severe rt flank/back pain. She has metastatic pancreatic cancer and is sp cycle one FOLFINOX on 12/26/2018. She was seen by NP for chemo follow up and was then sent over to infusion center for fluids and replacements. While in infusion center she developed stabbing pain, became diaphoretic and nauseated. RR was called and she received 4 mg morphine, 1 mg ativan, and 1 mg dilaudid with no relief. She was transported to [facility] ER via EMS. CT revealed renal infarct vs pyelonephritis. She was started on heparin drip and her pain has resolved. Vascular surgery and nephology have been consulted. We are consulted for recommendations. Oncology history copied from chart: Ms. Glen Rose was first seen in our office in December 2018 with Dr. Gemini Allen. She was seen to evaluate treatment options for her recently diagnosed pancreatic carcinoma. She was a woman of previously good health with the exception of hypertension, hypercholesterolemia and a remote JULIO C/BSO. She presented to her primary care provider at the end of October with nearly 1 month of progressive abdominal symptoms. The symptoms included changes in her bowel function with intermittent nausea and vomiting as well as episodic cramping. She was treated empirically with antibiotics for possible H. pylori but a CT scan was also ordered. The CT suggested the presence of a low-density mass in the distal body of the pancreas compatible with without metastatic disease. The lesion was felt to be potentially resectable.   CT scan of the chest showed a calcified lymph node in the left hilum and a tiny calcified granuloma in the right lower lung as well as in the spleen. Based on this, she was taken to surgery on November 12, 2018 at which point biopsies were taken but surgery aborted due to unresectability. Specifically the tumor was noted to invade into the retroperitoneum and around the root of the mesentery. Pathology from this procedure was notable for atypical glands consistent with features of adenocarcinoma. A gastro jejunostomy was created.  
  
She returns today for toxicity check following cycle 1 FOLFIRINOX. Since the day following pump removal she has had intractable nausea and vomiting and ha snot eaten much solids that has resulted in a 11 pound weight loss. She has only found to be able to tolerate yogurt and pudding. She is able to keep fluids down. She has been using zofran, phenergan, protonix BID, pepcid, ativan and creon without relief. She denies any bowel complaints. She is fatigued and weak and has exertional shortness of breath. She denies any cough. She states her pain is well controlled on Percocet 7.5 every 4 hours prn. She experienced cold sensitivity for 4 days, no residual neuropathy. She denies any fever, chills or other infectious symptoms. Review of Systems: 
Constitutional Denies fever, chills,. +appetite changes, +fatigue HEENT Denies trauma, blurry vision, hearing loss, ear pain, nosebleeds, sore throat, neck pain Skin Denies lesions or rashes. Lungs SOBOE Cardiovascular Denies chest pain, palpitations, or lower extremity edema. Neuro Denies headaches, visual changes or ataxia. Denies dizziness, tingling, tremors, sensory change, speech change, focal weakness or headaches. MSK Denies arthralgias, myalgias or frequent falls. Right flank pain Psychiatric/Behavioral Denies depression and substance abuse. The patient is not nervous/anxious. Allergies Allergen Reactions  Penicillins Unknown (comments) Childhood allergy. Past Medical History:  
Diagnosis Date  Cancer [facility]) Dx 2018  
 pancreaitic cancer  Hypercholesterolemia  Hypertension   
 managed with meds  Pancreatic mass Past Surgical History:  
Procedure Laterality Date [address] UNLISTED Exp Lap, resection pancreatitc mass  HX CHOLECYSTECTOMY  HX GYN    
 JULIO C BSO  HX HEENT    
 jaw surgery Family History Problem Relation Age of Onset  Elevated Lipids Mother  Hypertension Mother  Heart Disease Mother  Elevated Lipids Father  Hypertension Father  Heart Disease Father Social History Socioeconomic History  Marital status:  Spouse name: Not on file  Number of children: Not on file  Years of education: Not on file  Highest education level: Not on file Social Needs  Financial resource strain: Not on file  Food insecurity - worry: Not on file  Food insecurity - inability: Not on file  Transportation needs - medical: Not on file  Transportation needs - non-medical: Not on file Occupational History  Not on file Tobacco Use  Smoking status: Former Smoker Packs/day: 1.00 Years: 20.00 Pack years: 20.00 Last attempt to quit: 2018 Years since quittin.1  Smokeless tobacco: Never Used Substance and Sexual Activity  Alcohol use: No  
  Alcohol/week: 0.0 oz Frequency: Never  Drug use: No  
 Sexual activity: Not on file Other Topics Concern  Not on file Social History Narrative  Not on file Current Facility-Administered Medications Medication Dose Route Frequency Provider Last Rate Last Dose  
 0.9% sodium chloride infusion  50 mL/hr IntraVENous CONTINUOUS Rima Smith NP 50 mL/hr at 19 1030 50 mL/hr at 19 1030  
 heparin 25,000 units in dextrose 500 mL infusion  18-36 Units/kg/hr IntraVENous TITRATE Dilan Lion MD 27.3 mL/hr at 01/04/19 1214 16 Units/kg/hr at 01/04/19 1214  aspirin delayed-release tablet 81 mg  81 mg Oral DAILY Dontae Andrea MD   81 mg at 01/04/19 4917  
 atorvastatin (LIPITOR) tablet 20 mg  20 mg Oral QHS Dontae Andrea MD      
 dexamethasone (DECADRON) tablet 2 mg  2 mg Oral BID WITH MEALS Harshad Radford MD   2 mg at 01/04/19 7523  lidocaine-prilocaine (EMLA) 2.5-2.5 % cream   Topical PRN Harshad Radford MD      
 lipase-protease-amylase (CREON 36,000) capsule 2 Cap (Patient Supplied)  2 Cap Oral TID WITH MEALS Dontae Andrea MD   Stopped at 01/04/19 0800  
 lisinopril (PRINIVIL, ZESTRIL) tablet 20 mg  20 mg Oral DAILY Dontae Andrea MD   20 mg at 01/04/19 0919  LORazepam (ATIVAN) tablet 2 mg  2 mg Oral Q6H PRN Harshad Radford MD      
 magnesium hydroxide (MILK OF MAGNESIA) 400 mg/5 mL oral suspension 30 mL  30 mL Oral BID Dontae Andrea MD   30 mL at 01/04/19 0810  
 oxyCODONE-acetaminophen (PERCOCET 7.5) 7.5-325 mg per tablet 1 Tab  1 Tab Oral Q4H PRN Dontae Andrea MD      
 pantoprazole (PROTONIX) tablet 40 mg  40 mg Oral ACB&D Dontae Andrea MD   40 mg at 01/04/19 0811  
 promethazine (PHENERGAN) tablet 25 mg  25 mg Oral Q6H PRN Dontae Andrea MD      
 sodium chloride (NS) flush 5-10 mL  5-10 mL IntraVENous Q8H Harshad Radford MD      
 sodium chloride (NS) flush 5-10 mL  5-10 mL IntraVENous PRN Harshad Radford MD      
 acetaminophen (TYLENOL) tablet 650 mg  650 mg Oral Q4H PRN Dontae Andrea MD      
 naloxone [facility]) injection 0.4 mg  0.4 mg IntraVENous PRN Dontae Andrea MD      
 diphenhydrAMINE (BENADRYL) capsule 25 mg  25 mg Oral Q6H PRN Dontae Andrea MD      
 ondansetron [facility]) injection 4 mg  4 mg IntraVENous Q4H PRN Dontae Andrea MD      
  bisacodyl (DULCOLAX) tablet 5 mg  5 mg Oral DAILY PRN Iris Roberson MD      
 LORazepam (ATIVAN) tablet 1 mg  1 mg Oral BID PRN Iris Roberson MD      
 morphine IR (MS IR) tablet 30 mg  30 mg Oral Q4H PRN Iris Roberson MD   30 mg at 19 2300 Facility-Administered Medications Ordered in Other Encounters Medication Dose Route Frequency Provider Last Rate Last Dose  central line flush (saline) syringe 10 mL  10 mL InterCATHeter PRN Nathalie Chávez MD      
 morphine injection 2 mg  2 mg IntraVENous Q3H PRN Nathalie Chávez MD   2 mg at 19 1250 OBJECTIVE: 
Patient Vitals for the past 8 hrs: 
 BP Temp Pulse Resp SpO2  
19 1214 119/62 98.1 °F (36.7 °C) 82 20 98 % 19 1100 130/63    96 % 19 1040 132/65    96 % 19 1019 145/80    97 % 19 1000 130/61    97 % 19 0940 138/65    97 % 19 0920 136/69    95 % 19 0729 143/78    96 % 19 0715 143/76    96 % 19 0645 158/76    96 % 19 0629 150/77    96 % 19 0615 151/81    96 % 19 0600 147/82    96 % 19 0545 158/86    96 % 19 0529 154/84    97 % 19 0515 154/83    97 % Temp (24hrs), Av °F (36.7 °C), Min:97.8 °F (36.6 °C), Max:98.1 °F (36.7 °C) No intake/output data recorded. Physical Exam: 
Constitutional: Well developed, well nourished female in no acute distress, sitting comfortably in the hospital bed. Spouse at bedside. HEENT: Normocephalic and atraumatic. Oropharynx is clear, mucous membranes are moist.  Pupils are equal, round, and reactive to light. Extraocular muscles are intact. Sclerae anicteric. Skin Warm and dry. No bruising and no rash noted. No erythema. No pallor. Respiratory Lungs are clear to auscultation bilaterally without wheezes, rales or rhonchi, normal air exchange without accessory muscle use. CVS Normal rate, regular rhythm and normal S1 and S2. No murmurs, gallops, or rubs. Abdomen Mildly tender and nondistended, normoactive bowel sounds. Neuro Grossly nonfocal with no obvious sensory or motor deficits. MSK Normal range of motion in general.  No edema and no tenderness. Psych Appropriate mood and affect. Labs:   
Recent Results (from the past 24 hour(s)) PTT Collection Time: 01/03/19  7:52 PM  
Result Value Ref Range aPTT 26.0 24.7 - 39.8 SEC METABOLIC PANEL, BASIC Collection Time: 01/04/19  3:16 AM  
Result Value Ref Range Sodium 135 (L) 136 - 145 mmol/L Potassium 4.0 3.5 - 5.1 mmol/L Chloride 101 98 - 107 mmol/L  
 CO2 26 21 - 32 mmol/L Anion gap 8 7 - 16 mmol/L Glucose 137 (H) 65 - 100 mg/dL BUN 9 6 - 23 MG/DL Creatinine 0.77 0.6 - 1.0 MG/DL  
 GFR est AA >60 >60 ml/min/1.73m2 GFR est non-AA >60 >60 ml/min/1.73m2 Calcium 8.1 (L) 8.3 - 10.4 MG/DL  
CBC WITH AUTOMATED DIFF Collection Time: 01/04/19  3:16 AM  
Result Value Ref Range WBC 13.1 (H) 4.3 - 11.1 K/uL  
 RBC 3.78 (L) 4.05 - 5.2 M/uL  
 HGB 10.6 (L) 11.7 - 15.4 g/dL HCT 32.8 (L) 35.8 - 46.3 % MCV 86.8 79.6 - 97.8 FL  
 MCH 28.0 26.1 - 32.9 PG  
 MCHC 32.3 31.4 - 35.0 g/dL  
 RDW 14.8 (H) 11.9 - 14.6 % PLATELET 824 075 - 814 K/uL MPV 10.1 9.4 - 12.3 FL ABSOLUTE NRBC 0.00 0.0 - 0.2 K/uL  
 DF AUTOMATED NEUTROPHILS 82 (H) 43 - 78 % LYMPHOCYTES 12 (L) 13 - 44 % MONOCYTES 6 4.0 - 12.0 % EOSINOPHILS 0 (L) 0.5 - 7.8 % BASOPHILS 0 0.0 - 2.0 % IMMATURE GRANULOCYTES 1 0.0 - 5.0 %  
 ABS. NEUTROPHILS 10.7 (H) 1.7 - 8.2 K/UL  
 ABS. LYMPHOCYTES 1.5 0.5 - 4.6 K/UL  
 ABS. MONOCYTES 0.7 0.1 - 1.3 K/UL  
 ABS. EOSINOPHILS 0.0 0.0 - 0.8 K/UL  
 ABS. BASOPHILS 0.0 0.0 - 0.2 K/UL  
 ABS. IMM. GRANS. 0.1 0.0 - 0.5 K/UL  
PTT Collection Time: 01/04/19  3:18 AM  
Result Value Ref Range aPTT 124.5 (H) 24.7 - 39.8 SEC  
PTT  Collection Time: 01/04/19  9:27 AM  
 Result Value Ref Range aPTT 102.8 (H) 24.7 - 39.8 SEC  
 
 
 
 
ASSESSMENT: 
Problem List  Date Reviewed: 1/3/2019 Codes Class Noted * (Principal) Renal infarct ([facility] ICD-10-CM: N28.0 ICD-9-CM: 593.81  1/3/2019 Hypokalemia ICD-10-CM: E87.6 ICD-9-CM: 276.8  1/3/2019 Severe obesity ([facility] [address] ICD-10-CM: E66.01 
ICD-9-CM: 278.01  12/6/2018 Malignant neoplasm of tail of pancreas (HCC) (Chronic) ICD-10-CM: C25.2 ICD-9-CM: 157.2  12/6/2018 Hypercholesterolemia ICD-10-CM: E78.00 ICD-9-CM: 272.0  Unknown Hypertension (Chronic) ICD-10-CM: I10 
ICD-9-CM: 401.9  Unknown RECOMMENDATIONS: 
Metastatic pancreatic cancer sp cycle one FOLFIRINOX 12/26/2018 Renal infarct 1/4 on heparin drip. Nephrology and vascular surgeon consulted. Right flank pain 1/4 resolved Nausea/vomiting 1/4 continue antiemetics as needed. No appetite/RD consulted. Electrolyte abnormalities 1/4 corrected K+ and Na+. Continue to monitor. Echo ordered Lab studies and imaging studies were personally reviewed. Pertinent old records were reviewed. Thank you for allowing us to participate in the care of Ms. Pia Munoz. Bg Elise NP [facility] Hematology & Oncology [address] [place] [address] Office : [phone] Fax : [phone] Attending Addendum: 
Patient seen with LOREE Mchugh. Ms Pia Munoz is a andrzej 66yo woman with newly diagnosed pancreatic cancer currently s/p C1 of FOLFIRINOX. She presented yesterday to [facility] for tox screen and reported N/V and was found to be dehydrated with electrolyte abnormalities. She was treated in infusion with IVF when she developed acute right flank pain and was brought to [facility] for further evaluation. Her CT showed what seems to be most c/w right renal infarct. She was started on heparin gtt. Today, she is feeling better. Her flank pain is almost resolved.   She continues on heparin without any signs of bleeding. Echo pending. On exam, no arrhythmia noted to suggest afib. She should transition to indefinite anticoagulation with NOAC upon discharge, as pts with active malignancy are at increased risk for thrombosis. Her nausea and vomiting have resolved and she feels much better than yesterday. I personally performed a face to face diagnostic evaluation on this patient. My findings are as follows: A&ox3, lungs clear, RRR, abdomen benign and no LE edema.  at bedside updated. I have reviewed and agree with the care plan. Meaghan Roche MD 
[facility] Hematology and Oncology [address] [place] [address] Office : [phone] Fax : [phone]

## 2019-01-04 NOTE — CONSULTS
[address] [facility], [address] [facility] 
[phone] History and Physical / Surgical Consultation  
Marce Gordon    Admit date: 1/3/2019 MRN: [id number]     : 1960     Age: 66 y.o.       
 
2019 2:40 PM 
 
Subjective/HPI:  
This patient is a 66 y.o. white female seen at the request of Humera Robb, LOREE / Oncology service and evaluated for renal infarct. She was seen in the presence of her  but provides her own history. PMH with HTN, HL, obesity and recent diagnosis of metastatic pancreatic cancer s/p laparotomy identifying unresectable disease, she was admitted via the ED 1/3/2019 from the [facility] for excruciating right abdominal / flank pain and diaphoresis following her first round of chemotherapy and subsequent hydration / electrolyte replacement. CT identified pyelonephritis vs. renal infarct. She was admitted for pain control and IV heparin, and we were consulted. Today patient admits abdominal / flank pain has improved but not resolved, notes \"achey\" pain of 5/10. She denies further nausea or diaphoresis. She denies fever, chills, hematuria or oliguria. She is tolerating a diet. She denies chest pain or dyspnea. Patient's past medical, surgical, family and social histories were reviewed as noted here and below. Review of Systems Pertinent items are noted in HPI. Past Medical History:  
Diagnosis Date  Cancer [facility]) Dx 2018  
 pancreaitic cancer  Hypercholesterolemia  Hypertension   
 managed with meds  Pancreatic mass Past Surgical History:  
Procedure Laterality Date [address] UNLISTED Exp Lap, resection pancreatitc mass  HX CHOLECYSTECTOMY  HX GYN    
 JULIO C BSO  HX HEENT    
 jaw surgery Allergies Allergen Reactions  Penicillins Unknown (comments) Childhood allergy. Social History Tobacco Use  Smoking status: Former Smoker Packs/day: 1.00 Years: 20.00 Pack years: 20.00 Last attempt to quit: 2018 Years since quittin.1  Smokeless tobacco: Never Used Substance Use Topics  Alcohol use: No  
  Alcohol/week: 0.0 oz Frequency: Never Social History Social History Narrative  Not on file Family History Problem Relation Age of Onset  Elevated Lipids Mother  Hypertension Mother  Heart Disease Mother  Elevated Lipids Father  Hypertension Father  Heart Disease Father Prior to Admission Medications Prescriptions Last Dose Informant Patient Reported? Taking? LORazepam (ATIVAN) 2 mg tablet   No No  
Sig: Take 1 Tab by mouth every six (6) hours as needed. Max Daily Amount: 8 mg. aspirin delayed-release 81 mg tablet   Yes No  
Sig: Take 81 mg by mouth daily. atorvastatin (LIPITOR) 20 mg tablet   Yes No  
Sig: Take 20 mg by mouth nightly. dexamethasone (DECADRON) 2 mg tablet   No No  
Sig: Take 1 Tab by mouth two (2) times daily (with meals). lidocaine-prilocaine (EMLA) topical cream   No No  
Sig: Apply  to affected area as needed for Pain. Apply to port site 45-60 minutes prior to lab appointment or infusion appointment. lipase-protease-amylase (CREON) 36,000-114,000- 180,000 unit cpDR capsule   No No  
Sig: Take 2 capsules with each meal and 1 with snacks. lisinopril (PRINIVIL, ZESTRIL) 20 mg tablet   No No  
Sig: Take 1 Tab by mouth daily. magnesium hydroxide (BARBOZA MILK OF MAGNESIA) 400 mg/5 mL suspension   Yes No  
Sig: Take 30 mL by mouth two (2) times a day. ondansetron hcl (ZOFRAN) 8 mg tablet   Yes No  
Sig: TAKE 1 TABLET BY MOUTH EVERY 8 HOURS AS NEEDED FOR NAUSEA  
oxyCODONE-acetaminophen (PERCOCET 7.5) 7.5-325 mg per tablet   No No  
Sig: Take 1 Tab by mouth every four (4) hours as needed. Max Daily Amount: 6 Tabs. pantoprazole (PROTONIX) 40 mg tablet   No No  
Sig: Take 1 Tab by mouth Before breakfast and dinner. promethazine (PHENERGAN) 25 mg tablet   No No  
Sig: Take 1 Tab by mouth every six (6) hours as needed for Nausea (and vomiting). Facility-Administered Medications: None Current Facility-Administered Medications Medication Dose Route Frequency  0.9% sodium chloride infusion  50 mL/hr IntraVENous CONTINUOUS  
 heparin 25,000 units in dextrose 500 mL infusion  18-36 Units/kg/hr IntraVENous TITRATE  aspirin delayed-release tablet 81 mg  81 mg Oral DAILY  atorvastatin (LIPITOR) tablet 20 mg  20 mg Oral QHS  dexamethasone (DECADRON) tablet 2 mg  2 mg Oral BID WITH MEALS  lidocaine-prilocaine (EMLA) 2.5-2.5 % cream   Topical PRN  
 lipase-protease-amylase (CREON 36,000) capsule 2 Cap (Patient Supplied)  2 Cap Oral TID WITH MEALS  lisinopril (PRINIVIL, ZESTRIL) tablet 20 mg  20 mg Oral DAILY  LORazepam (ATIVAN) tablet 2 mg  2 mg Oral Q6H PRN  
 magnesium hydroxide (MILK OF MAGNESIA) 400 mg/5 mL oral suspension 30 mL  30 mL Oral BID  
 oxyCODONE-acetaminophen (PERCOCET 7.5) 7.5-325 mg per tablet 1 Tab  1 Tab Oral Q4H PRN  pantoprazole (PROTONIX) tablet 40 mg  40 mg Oral ACB&D  promethazine (PHENERGAN) tablet 25 mg  25 mg Oral Q6H PRN  
 sodium chloride (NS) flush 5-10 mL  5-10 mL IntraVENous Q8H  
 sodium chloride (NS) flush 5-10 mL  5-10 mL IntraVENous PRN  
 acetaminophen (TYLENOL) tablet 650 mg  650 mg Oral Q4H PRN  
 naloxone (NARCAN) injection 0.4 mg  0.4 mg IntraVENous PRN  
 diphenhydrAMINE (BENADRYL) capsule 25 mg  25 mg Oral Q6H PRN  
 ondansetron (ZOFRAN) injection 4 mg  4 mg IntraVENous Q4H PRN  
 bisacodyl (DULCOLAX) tablet 5 mg  5 mg Oral DAILY PRN  
 LORazepam (ATIVAN) tablet 1 mg  1 mg Oral BID PRN  
 morphine IR (MS IR) tablet 30 mg  30 mg Oral Q4H PRN Facility-Administered Medications Ordered in Other Encounters Medication Dose Route Frequency  central line flush (saline) syringe 10 mL  10 mL InterCATHeter PRN  
  morphine injection 2 mg  2 mg IntraVENous Q3H PRN Objective:  
 
Vitals:  
 01/04/19 1019 01/04/19 1040 01/04/19 1100 01/04/19 1214 BP: 145/80 132/65 130/63 119/62 Pulse:    82 Resp:    20 Temp:    98.1 °F (36.7 °C) SpO2: 97% 96% 96% 98% Physical Exam:  
General well-developed, obese, in no acute distress Skin  warm and moist with good texture and good turgor; no jaundice or rashes HEENT normocephalic, extraocular muscles intact; mouth with adequate dentition, oral mucosa moist 
Neck  supple without adenopathy, JVD or bruits; trachea midline Lungs  respirations unlabored, lungs clear to auscultation bilaterally Heart  regular rate and rhythm, no appreciable murmurs Abdomen soft, protuberant but non-distended, mildly globally tender; bowel sounds normoactive Extremities warm without cyanosis; normal ROM Pulses  2+ palpable and symmetric at radial, brachial, femoral, DP and PT Neurological alert and oriented; no gross sensorimotor deficits Data Review Recent Labs 01/04/19 
0316 01/03/19 
1001 WBC 13.1* 14.0* HGB 10.6* 13.0 HCT 32.8* 39.9  499* Recent Labs 01/04/19 
0316 01/03/19 
1001 * 131* K 4.0 2.9*  
 93* CO2 26 34* * 135* BUN 9 12 CREA 0.77 0.94 Imaging CT abdomen and pelvis dated 1/3/2019 CLINICAL INFORMATION: Right lower quadrant pain, nausea and vomiting Comparison made with outside CT 11/8/2018 IMPRESSION: 
1. No change in a mass in tail of pancreas. 2. Multiple sharply circumscribed areas of decreased enhancement in the mid and 
lower right kidney. Pyelonephritis or ischemic change could give this 
appearance. 3. Diverticulosis Assessment / Plan:  
 
Hospital Problems  Date Reviewed: 1/3/2019 Codes Class Noted POA * (Principal) Renal infarct ([facility] [address] ICD-10-CM: N28.0 ICD-9-CM: 593.81  1/3/2019 Yes Hypokalemia ICD-10-CM: E87.6 ICD-9-CM: 276.8  1/3/2019 Yes Malignant neoplasm of tail of pancreas (HCC) (Chronic) ICD-10-CM: C25.2 ICD-9-CM: 157.2  12/6/2018 Yes Hypertension (Chronic) ICD-10-CM: I10 
ICD-9-CM: 401.9  Unknown Yes  
   
  
 
 
Deloris Colon is a 66 y.o. female with renal infarct following hydration / electrolyte replacement after 1st episode of chemotherapy for unresectable metastatic pancreatic cancer. Patient was discussed with vascular surgeon on call, Idalia Sanchez MD who independently reviewed imaging and EMR. - agree with IV heparin, pain control, HTN control 
- no indication for surgical intervention by vascular service Thank you very much for this referral. We appreciate the opportunity to participate in this patient's care. We will follow along with above stated plan. Naomie Verma PA-C Physician Assistant with [facility] Vascular Surgery [place].  Kari Ziegler MD / Shana Zheng MD

## 2019-01-04 NOTE — PROGRESS NOTES
Hospitalist Progress Note 2019 Admit Date: 1/3/2019  2:51 PM  
NAME: Reynold Cuenca :  1960 MRN:  [id number] Attending: Hubert Ortiz MD 
PCP:  Tj Wilson MD 
 
SUBJECTIVE:  
Patient is a pleasant 07KVK with known pancreatic cancer found to have evidence of renal infarct on CT. Admitted last night by Hospitalist Service to start heparin drip with plan for Oncology to assume care today. Patient feels okay. No current complaints. Still in ER awaiting bed, heparin drip going. Review of Systems negative with exception of pertinent positives noted above PHYSICAL EXAM  
 
Visit Vitals /63 Pulse 87 Temp 97.8 °F (36.6 °C) Resp 16 SpO2 96% Temp (24hrs), Av.8 °F (36.6 °C), Min:97.8 °F (36.6 °C), Max:97.8 °F (36.6 °C) Oxygen Therapy O2 Sat (%): 96 % (19 1100) Pulse via Oximetry: 80 beats per minute (19 1100) O2 Device: Room air (19 1356) No intake or output data in the 24 hours ending 19 1129 General: No acute distress   
Lungs:  CTA Bilaterally. Heart:  Regular rate and rhythm,  No murmur, rub, or gallop Abdomen: Soft, Non distended, Non tender, Positive bowel sounds Extremities: No cyanosis, clubbing or edema Neurologic:  No focal deficits ASSESSMENT Active Hospital Problems Diagnosis Date Noted  Renal infarct ([facility] [address] 2019  Hypokalemia 2019  Malignant neoplasm of tail of pancreas ([address] 2018  Hypertension Plan: 
Renal Infarct 
- Heparin drip - Vascular consult Malignant Neoplasm of Pancreas 
- Oncology to assume care - Appreciate their involvement Hypokalemia - Replace - Monitor HTN 
- Home meds DVT Prophylaxis: heparin drip Signed By: Sarthak Preston MD   
 2019

## 2019-01-04 NOTE — PROGRESS NOTES
Care Management Interventions PCP Verified by CM: Ralph Servin MD) Mode of Transport at Discharge: (family) Transition of Care Consult (CM Consult): Discharge Planning(Pt is insured with pharmacy benefits. She has close follow up at the [facility] for treatment of her pancreatic cancer.  ) Current Support Network: Lives with Spouse(Pt normally manages  ADL's. Lives with spouse and has supportive extended family.) Confirm Follow Up Transport: Family Plan discussed with Pt/Family/Caregiver: Yes Freedom of Choice Offered: Yes [employer] Information Provided?: No 
Discharge Location Discharge Placement: Home with outpatient services(Continued out pt care at [facility].)

## 2019-01-04 NOTE — H&P
HOSPITALIST H&PNAME:  Pricila Reed Age:  66 y.o. 
:   1960 MRN:   [id number] PCP: Sonali Sandhu MD 
Treatment Team: Attending Provider: Zee Nolasco MD; Primary Nurse: Bean Abreu RN Full Code CC: Reason for admission is: renal infarct HPI:  
Patient history was obtained from the ER provider prior to seeing the patient. Patient is a 66 y.o. female who presents to the ER from [facility] center due to sudden onset of severe rt flank/back pain. She has pancreatic cancer and is on chemo; but she was at [facility] for IVF due to mild dehydration. She describes pain as severe, stabbing, non-radiating at onset; now has had pain meds and is more achy/throbbing. She denies any fever/chills, urinary symptoms, prior similar pain, no h/o kidney stones. She does have n/v related to her chemo. ER workup with CT abd does show a renal infarct and they discussed with oncology. We are asked to start heparin and admit, they will assume care in am 
 
ROS: 
All systems have been reviewed and are negative except as stated in HPI or elsewhere. Past Medical History:  
Diagnosis Date  Cancer [facility]) Dx 2018  
 pancreaitic cancer  Hypercholesterolemia  Hypertension   
 managed with meds  Pancreatic mass Past Surgical History:  
Procedure Laterality Date [address] UNLISTED Exp Lap, resection pancreatitc mass  HX CHOLECYSTECTOMY  HX GYN    
 JULIO C BSO  HX HEENT    
 jaw surgery Social History Tobacco Use  Smoking status: Former Smoker Packs/day: 1.00 Years: 20.00 Pack years: 20.00 Last attempt to quit: 2018 Years since quittin.1  Smokeless tobacco: Never Used Substance Use Topics  Alcohol use: No  
  Alcohol/week: 0.0 oz Frequency: Never Family History Problem Relation Age of Onset  Elevated Lipids Mother  Hypertension Mother  Heart Disease Mother  Elevated Lipids Father  Hypertension Father  Heart Disease Father FH Reviewed and non-contributory to admitting diagnosis Allergies Allergen Reactions  Penicillins Unknown (comments) Childhood allergy. Prior to Admission Medications Prescriptions Last Dose Informant Patient Reported? Taking? LORazepam (ATIVAN) 2 mg tablet   No No  
Sig: Take 1 Tab by mouth every six (6) hours as needed. Max Daily Amount: 8 mg. aspirin delayed-release 81 mg tablet   Yes No  
Sig: Take 81 mg by mouth daily. atorvastatin (LIPITOR) 20 mg tablet   Yes No  
Sig: Take 20 mg by mouth nightly. dexamethasone (DECADRON) 2 mg tablet   No No  
Sig: Take 1 Tab by mouth two (2) times daily (with meals). lidocaine-prilocaine (EMLA) topical cream   No No  
Sig: Apply  to affected area as needed for Pain. Apply to port site 45-60 minutes prior to lab appointment or infusion appointment. lipase-protease-amylase (CREON) 36,000-114,000- 180,000 unit cpDR capsule   No No  
Sig: Take 2 capsules with each meal and 1 with snacks. lisinopril (PRINIVIL, ZESTRIL) 20 mg tablet   No No  
Sig: Take 1 Tab by mouth daily. magnesium hydroxide (APJeT MILK OF MAGNESIA) 400 mg/5 mL suspension   Yes No  
Sig: Take 30 mL by mouth two (2) times a day. ondansetron hcl (ZOFRAN) 8 mg tablet   Yes No  
Sig: TAKE 1 TABLET BY MOUTH EVERY 8 HOURS AS NEEDED FOR NAUSEA  
oxyCODONE-acetaminophen (PERCOCET 7.5) 7.5-325 mg per tablet   No No  
Sig: Take 1 Tab by mouth every four (4) hours as needed. Max Daily Amount: 6 Tabs. pantoprazole (PROTONIX) 40 mg tablet   No No  
Sig: Take 1 Tab by mouth Before breakfast and dinner. promethazine (PHENERGAN) 25 mg tablet   No No  
Sig: Take 1 Tab by mouth every six (6) hours as needed for Nausea (and vomiting). Facility-Administered Medications Last Administration Doses Remaining  
heparin (porcine) pf 500 Units 1/3/2019  9:56 AM 0 Objective: Patient Vitals for the past 24 hrs: 
 Temp Pulse Resp BP SpO2  
19  82  150/86 96 % 19 2130  86  151/75 96 % 19  85  151/75 99 % 19 1930  84  145/69 99 % 19 1901  86  162/77 98 % 19 1830  84  147/78 97 % 19 1801  79  173/82 98 % 19 1730  88  179/81 99 % 19 1700  89  131/58 96 % 19 1649  89  140/65 97 % 19 1356 97.8 °F (36.6 °C) 84 16 143/88 97 % No intake or output data in the 24 hours ending 19 2302 Temp (24hrs), Av.6 °F (36.4 °C), Min:97.4 °F (36.3 °C), Max:97.8 °F (36.6 °C) Oxygen Therapy O2 Sat (%): 96 % (19) Pulse via Oximetry: 82 beats per minute (19) O2 Device: Room air (19 1356) There is no height or weight on file to calculate BMI. Physical Exam: 
 
General:    WD and WN, No apparent distress. Head:   Normocephalic, without obvious abnormality, atraumatic. Eyes:  PERRL; EOMI; sclera normal/non-icteric ENT:  Hearing is normal.  Oropharynx is clear with tacky mucous membranes Resp:    Clear to auscultation bilaterally. No Wheezing or Rhonchi. Resp are even and unlabored Heart[de-identified]  Regular rate and rhythm,  no murmur,   No LE edema Abdomen:   Soft, non-tender. Not distended. Bowel sounds normal.  hepato-splenomegaly cannot be assess due to obesity Musc/SK: Muscle strength is good and tone normal; No cyanosis. No clubbing Skin:     Texture, turgor normal. No significant rashes or lesions. Neurologic: CN II - XII are grossly intact - other than Eye exam as noted above Psych: Alert and oriented x 4;  Judgement and insight are normal 
  
Data Review:  
Recent Results (from the past 24 hour(s)) CBC WITH AUTOMATED DIFF Collection Time: 19 10:01 AM  
Result Value Ref Range WBC 14.0 (H) 4.3 - 11.1 K/uL  
 RBC 4.67 4.05 - 5.25 M/uL  
 HGB 13.0 11.7 - 15.4 g/dL HCT 39.9 35.8 - 46.3 %  MCV 85.4 79.6 - 97.8 FL  
 MCH 27.8 26.1 - 32.9 PG  
 MCHC 32.6 31.4 - 35.0 g/dL  
 RDW 15.0 (H) 11.9 - 14.6 % PLATELET 856 (H) 604 - 450 K/uL MPV 9.4 9.4 - 12.3 FL ABSOLUTE NRBC 0.00 0.0 - 0.2 K/uL  
 DF AUTOMATED NEUTROPHILS 81 (H) 43 - 78 % LYMPHOCYTES 13 13 - 44 % MONOCYTES 3 (L) 4.0 - 12.0 % EOSINOPHILS 2 0.5 - 7.8 % BASOPHILS 0 0.0 - 2.0 % IMMATURE GRANULOCYTES 1 0.0 - 5.0 %  
 ABS. NEUTROPHILS 11.4 (H) 1.7 - 8.2 K/UL  
 ABS. LYMPHOCYTES 1.8 0.5 - 4.6 K/UL  
 ABS. MONOCYTES 0.5 0.1 - 1.3 K/UL  
 ABS. EOSINOPHILS 0.2 0.0 - 0.8 K/UL  
 ABS. BASOPHILS 0.1 0.0 - 0.2 K/UL  
 ABS. IMM. GRANS. 0.1 0.0 - 0.5 K/UL METABOLIC PANEL, COMPREHENSIVE Collection Time: 01/03/19 10:01 AM  
Result Value Ref Range Sodium 131 (L) 136 - 145 mmol/L Potassium 2.9 (LL) 3.5 - 5.1 mmol/L Chloride 93 (L) 98 - 107 mmol/L  
 CO2 34 (H) 21 - 32 mmol/L Anion gap 4 (L) 7 - 16 mmol/L Glucose 135 (H) 65 - 100 mg/dL BUN 12 6 - 23 MG/DL Creatinine 0.94 0.6 - 1.0 MG/DL  
 GFR est AA >60 >60 ml/min/1.73m2 GFR est non-AA >60 >60 ml/min/1.73m2 Calcium 8.9 8.3 - 10.4 MG/DL Bilirubin, total 0.4 0.2 - 1.1 MG/DL  
 ALT (SGPT) 43 12 - 65 U/L  
 AST (SGOT) 41 (H) 15 - 37 U/L Alk. phosphatase 124 50 - 136 U/L Protein, total 7.8 6.3 - 8.2 g/dL Albumin 3.5 3.5 - 5.0 g/dL Globulin 4.3 (H) 2.3 - 3.5 g/dL A-G Ratio 0.8 (L) 1.2 - 3.5 LIPASE Collection Time: 01/03/19 10:01 AM  
Result Value Ref Range Lipase 208 73 - 393 U/L  
AMYLASE Collection Time: 01/03/19 10:01 AM  
Result Value Ref Range Amylase 27 25 - 115 U/L  
PTT Collection Time: 01/03/19  7:52 PM  
Result Value Ref Range aPTT 26.0 24.7 - 39.8 SEC CXR Results  (Last 48 hours) None CT Results  (Last 48 hours) 01/03/19 1619  CT ABD PELV W CONT Final result Impression:  IMPRESSION:  
1. No change in a mass in tail of pancreas.   
2. Multiple sharply circumscribed areas of decreased enhancement in the mid and  
lower right kidney. Pyelonephritis or ischemic change could give this  
appearance. 3. Diverticulosis Narrative:  CT abdomen and pelvis dated 1/3/2019 CLINICAL INFORMATION: Right lower quadrant pain, nausea and vomiting Comparison made with outside CT 11/8/2018 Spiral images of the abdomen and pelvis were obtained during intravenous  
injection of 100 cc nonionic contrast.  
   
This study requested and performed no oral contrast. This limits evaluation of  
bowel. All CT scanners at this facility use one or all of the following:  automated  
exposure control, adjustment of the mA and or kVp according to patient size,  
iterative reconstruction CT ABDOMEN:  
Upper images show no infiltrate in either lung base. The spleen is small measuring 2.6 x 1.7 cm. There is diffuse fatty infiltration  
of the liver. Mass is noted at the tail of the pancreas. This measures 3.6 x 2.5  
cm. Overall no definite change. There are postoperative changes at the stomach. Adrenals are unremarkable. The kidneys are normal in size. Left kidney is  
unremarkable. There are sharply circumscribed areas of decreased contrast  
enhancement in the mid and lower pole the right kidney. Pyelonephritis or  
ischemic change could give this appearance. Abdominal aorta is normal in size. No adenopathy. CT PELVIS:  
No mass, adenopathy or abnormal fluid collection. There is mild colonic  
diverticulosis. Assessment and Plan: Active Hospital Problems Diagnosis Date Noted  Renal infarct ([address] 01/03/2019  Hypokalemia 01/03/2019  Malignant neoplasm of tail of pancreas ([address] 12/06/2018  Hypertension Principal Problem: 
  Renal infarct ([address] (1/3/2019) Start IV heparin drip; pain control; consult nephrology Active Problems: Hypertension () Home meds Malignant neoplasm of tail of pancreas ([address] (12/6/2018) Consult Oncology for assumption of care HypoK+ Replace and monitor · PLAN General 
·  
· Cont appropriate home meds (see MAR) · Control symptoms (pain, n/v, fever, etc) · Monitor appropriate labs · DVT prophylaxis:  Heparin drip · Code status: Full;  HCPOA:  
· Risk: high · Anticipated DC needs: · Estimated LOS:  Greater than 2 midnights · Plans discussed with patient and/or caregiver; questions answered. Med records reviewed if applicable; findings:  
 
Critical care time if applicable:   
 
Signed By: Mily Willard MD   
 January 3, 2019

## 2019-01-04 NOTE — PROGRESS NOTES
TRANSFER - IN REPORT: 
 
Verbal report received from  [facility] on Ayla Vargas  being received from ER for routine progression of care Report consisted of patients Situation, Background, Assessment and  
Recommendations(SBAR). Information from the following report(s) SBAR, Kardex, ED Summary, Procedure Summary, Intake/Output, MAR and Recent Results was reviewed with the receiving nurse. Opportunity for questions and clarification was provided. Assessment completed upon patients arrival to unit and care assumed.

## 2019-01-05 LAB
ANION GAP SERPL CALC-SCNC: 11 MMOL/L (ref 7–16)
APTT PPP: 124.5 SEC (ref 24.7–39.8)
APTT PPP: 92.3 SEC (ref 24.7–39.8)
BASOPHILS # BLD: 0 K/UL (ref 0–0.2)
BASOPHILS NFR BLD: 0 % (ref 0–2)
BUN SERPL-MCNC: 6 MG/DL (ref 6–23)
CALCIUM SERPL-MCNC: 8.4 MG/DL (ref 8.3–10.4)
CHLORIDE SERPL-SCNC: 103 MMOL/L (ref 98–107)
CO2 SERPL-SCNC: 23 MMOL/L (ref 21–32)
CREAT SERPL-MCNC: 0.74 MG/DL (ref 0.6–1)
DIFFERENTIAL METHOD BLD: ABNORMAL
EOSINOPHIL # BLD: 0 K/UL (ref 0–0.8)
EOSINOPHIL NFR BLD: 0 % (ref 0.5–7.8)
ERYTHROCYTE [DISTWIDTH] IN BLOOD BY AUTOMATED COUNT: 15.2 % (ref 11.9–14.6)
GLUCOSE SERPL-MCNC: 126 MG/DL (ref 65–100)
HCT VFR BLD AUTO: 33 % (ref 35.8–46.3)
HGB BLD-MCNC: 10.5 G/DL (ref 11.7–15.4)
IMM GRANULOCYTES # BLD: 0.1 K/UL (ref 0–0.5)
IMM GRANULOCYTES NFR BLD AUTO: 1 % (ref 0–5)
LYMPHOCYTES # BLD: 2.3 K/UL (ref 0.5–4.6)
LYMPHOCYTES NFR BLD: 18 % (ref 13–44)
MCH RBC QN AUTO: 28.3 PG (ref 26.1–32.9)
MCHC RBC AUTO-ENTMCNC: 31.8 G/DL (ref 31.4–35)
MCV RBC AUTO: 88.9 FL (ref 79.6–97.8)
MONOCYTES # BLD: 0.8 K/UL (ref 0.1–1.3)
MONOCYTES NFR BLD: 6 % (ref 4–12)
NEUTS SEG # BLD: 9.7 K/UL (ref 1.7–8.2)
NEUTS SEG NFR BLD: 75 % (ref 43–78)
NRBC # BLD: 0 K/UL (ref 0–0.2)
PLATELET # BLD AUTO: 419 K/UL (ref 150–450)
PMV BLD AUTO: 9.8 FL (ref 9.4–12.3)
POTASSIUM SERPL-SCNC: 3.8 MMOL/L (ref 3.5–5.1)
RBC # BLD AUTO: 3.71 M/UL (ref 4.05–5.2)
SODIUM SERPL-SCNC: 137 MMOL/L (ref 136–145)
WBC # BLD AUTO: 13 K/UL (ref 4.3–11.1)

## 2019-01-05 PROCEDURE — 77030003560 HC NDL HUBR BARD -A

## 2019-01-05 PROCEDURE — 80048 BASIC METABOLIC PNL TOTAL CA: CPT

## 2019-01-05 PROCEDURE — 77030020263 HC SOL INJ SOD CL0.9% LFCR 1000ML

## 2019-01-05 PROCEDURE — 99232 SBSQ HOSP IP/OBS MODERATE 35: CPT | Performed by: INTERNAL MEDICINE

## 2019-01-05 PROCEDURE — 74011250637 HC RX REV CODE- 250/637: Performed by: FAMILY MEDICINE

## 2019-01-05 PROCEDURE — 74011250636 HC RX REV CODE- 250/636: Performed by: NURSE PRACTITIONER

## 2019-01-05 PROCEDURE — 74011250636 HC RX REV CODE- 250/636: Performed by: FAMILY MEDICINE

## 2019-01-05 PROCEDURE — 74011250636 HC RX REV CODE- 250/636: Performed by: EMERGENCY MEDICINE

## 2019-01-05 PROCEDURE — 65270000029 HC RM PRIVATE

## 2019-01-05 PROCEDURE — 36415 COLL VENOUS BLD VENIPUNCTURE: CPT

## 2019-01-05 PROCEDURE — 85730 THROMBOPLASTIN TIME PARTIAL: CPT

## 2019-01-05 PROCEDURE — 85025 COMPLETE CBC W/AUTO DIFF WBC: CPT

## 2019-01-05 RX ORDER — PROCHLORPERAZINE EDISYLATE 5 MG/ML
10 INJECTION INTRAMUSCULAR; INTRAVENOUS
Status: DISCONTINUED | OUTPATIENT
Start: 2019-01-05 | End: 2019-01-07 | Stop reason: HOSPADM

## 2019-01-05 RX ORDER — LORAZEPAM 2 MG/ML
1 INJECTION INTRAMUSCULAR
Status: DISCONTINUED | OUTPATIENT
Start: 2019-01-05 | End: 2019-01-07 | Stop reason: HOSPADM

## 2019-01-05 RX ORDER — LOPERAMIDE HYDROCHLORIDE 2 MG/1
2 CAPSULE ORAL
Status: DISCONTINUED | OUTPATIENT
Start: 2019-01-05 | End: 2019-01-07 | Stop reason: HOSPADM

## 2019-01-05 RX ORDER — ONDANSETRON 2 MG/ML
8 INJECTION INTRAMUSCULAR; INTRAVENOUS EVERY 8 HOURS
Status: DISCONTINUED | OUTPATIENT
Start: 2019-01-05 | End: 2019-01-07 | Stop reason: HOSPADM

## 2019-01-05 RX ADMIN — LORAZEPAM 1 MG: 1 TABLET ORAL at 10:39

## 2019-01-05 RX ADMIN — ONDANSETRON 8 MG: 2 INJECTION INTRAMUSCULAR; INTRAVENOUS at 16:00

## 2019-01-05 RX ADMIN — ONDANSETRON 4 MG: 2 INJECTION INTRAMUSCULAR; INTRAVENOUS at 09:12

## 2019-01-05 RX ADMIN — LORAZEPAM 2 MG: 1 TABLET ORAL at 19:06

## 2019-01-05 RX ADMIN — Medication 10 ML: at 14:30

## 2019-01-05 RX ADMIN — LOPERAMIDE HYDROCHLORIDE 2 MG: 2 CAPSULE ORAL at 19:07

## 2019-01-05 RX ADMIN — MORPHINE SULFATE 30 MG: 30 TABLET ORAL at 19:07

## 2019-01-05 RX ADMIN — DEXAMETHASONE 2 MG: 0.5 TABLET ORAL at 09:23

## 2019-01-05 RX ADMIN — ONDANSETRON 8 MG: 2 INJECTION INTRAMUSCULAR; INTRAVENOUS at 23:35

## 2019-01-05 RX ADMIN — LOPERAMIDE HYDROCHLORIDE 2 MG: 2 CAPSULE ORAL at 12:08

## 2019-01-05 RX ADMIN — LISINOPRIL 20 MG: 20 TABLET ORAL at 09:08

## 2019-01-05 RX ADMIN — DEXAMETHASONE 2 MG: 0.5 TABLET ORAL at 16:58

## 2019-01-05 RX ADMIN — PANTOPRAZOLE SODIUM 40 MG: 40 TABLET, DELAYED RELEASE ORAL at 06:02

## 2019-01-05 RX ADMIN — HEPARIN SODIUM AND DEXTROSE 16 UNITS/KG/HR: 5000; 5 INJECTION INTRAVENOUS at 09:21

## 2019-01-05 RX ADMIN — LOPERAMIDE HYDROCHLORIDE 2 MG: 2 CAPSULE ORAL at 07:15

## 2019-01-05 RX ADMIN — Medication 5 ML: at 06:02

## 2019-01-05 RX ADMIN — SODIUM CHLORIDE 50 ML/HR: 900 INJECTION, SOLUTION INTRAVENOUS at 09:27

## 2019-01-05 RX ADMIN — LOPERAMIDE HYDROCHLORIDE 2 MG: 2 CAPSULE ORAL at 23:35

## 2019-01-05 RX ADMIN — Medication 10 ML: at 21:24

## 2019-01-05 RX ADMIN — PANTOPRAZOLE SODIUM 40 MG: 40 TABLET, DELAYED RELEASE ORAL at 16:58

## 2019-01-05 RX ADMIN — ASPIRIN 81 MG: 81 TABLET, COATED ORAL at 09:07

## 2019-01-05 NOTE — PROGRESS NOTES
1719-TRANSFER - IN REPORT: 
Verbal report received from Jaida RN(name) on Diogo Rowland  being received from 2nd floor (unit) for routine progression of care Report consisted of patients Situation, Background, Assessment and  
Recommendations(SBAR). Information from the following report(s) SBAR, Kardex, Intake/Output, MAR and Recent Results was reviewed with the receiving nurse. Opportunity for questions and clarification was provided. Assessment will be completed upon patients arrival to unit and care assumed. 1824-Pt arrived to room from 2nd floor. Primary Nurse Cassandra Underwood and Trinda Blizzard, RN performed a dual skin assessment on this patient No impairment noted.

## 2019-01-05 NOTE — PROGRESS NOTES
Transferred to [facility] via wheelchair and heparin drip verified with receiving RN. In no distress at time of transfer.

## 2019-01-05 NOTE — PROGRESS NOTES
1831-Spoke with Indu Ovalle NP and she stated okay to discontinue c diff order due to pt taking milk of mag for last two days.

## 2019-01-05 NOTE — PROGRESS NOTES
Central line dressing changed to lifeport. positiive blood return obtained when ingram needle changed.

## 2019-01-05 NOTE — PROGRESS NOTES
[facility] Hematology & Oncology Inpatient Hematology / Oncology Progress NoteAdmission Date: 1/3/2019  2:51 PM 
Reason for Admission/Hospital Course: Renal infarct ([address] 24 Hour Events: Worsening nausea overnight Pain resolved On heparin gtt New onset diarrhea (>10x yesterday and 6 today) VSS, afebrile Family at bedside Echo ROS: 
Constitutional: negative for fever, chills. + malaise CV: negative for chest pain, palpitations, edema. Respiratory: negative for dyspnea, cough, wheezing. GI: Positive for N/V/D; no abdominal pain 10 point review of systems is otherwise negative with the exception of the elements mentioned above in the HPI. Allergies Allergen Reactions  Penicillins Unknown (comments) Childhood allergy. OBJECTIVE: 
Patient Vitals for the past 8 hrs: 
 BP Temp Pulse Resp SpO2  
19 1125 136/72 97.4 °F (36.3 °C) 88 18 96 % 19 1115 143/68 97.9 °F (36.6 °C) 98 18 96 % Temp (24hrs), Av.1 °F (36.7 °C), Min:97.4 °F (36.3 °C), Max:98.7 °F (37.1 °C) 
 
 07 -  1900 In: 1815 [P.O.:370; I.V.:1445] Out: - Physical Exam: 
Constitutional: Well developed, well nourished female in no acute distress at this time, sitting comfortably in the hospital bed. HEENT: Normocephalic and atraumatic. Oropharynx is clear, mucous membranes are moist.  Pupils are equal, round, and reactive to light. Extraocular muscles are intact. Sclerae anicteric. Lymph node No palpable submandibular, cervical  
Skin Warm and dry. No bruising and no rash noted. No erythema. No pallor. Respiratory Lungs are clear to auscultation bilaterally without wheezes, rales or rhonchi, normal air exchange without accessory muscle use. CVS Normal rate, regular rhythm and normal S1 and S2. No murmurs Abdomen Soft, nontender and nondistended, normoactive bowel sounds. Neuro Grossly nonfocal with no obvious sensory or motor deficits. MSK Normal range of motion in general.  No edema and no tenderness. Psych Appropriate mood and affect. Labs: 
   
Recent Labs 19 
03219 
0316 19 
1001 WBC 13.0* 13.1* 14.0*  
RBC 3.71* 3.78* 4.67 HGB 10.5* 10.6* 13.0 HCT 33.0* 32.8* 39.9 MCV 88.9 86.8 85.4 MCH 28.3 28.0 27.8 MCHC 31.8 32.3 32.6 RDW 15.2* 14.8* 15.0*  
 404 499* GRANS 75 82* 81* LYMPH 18 12* 13  
MONOS 6 6 3* EOS 0* 0* 2 BASOS 0 0 0 IG 1 1 1 DF AUTOMATED AUTOMATED AUTOMATED ANEU 9.7* 10.7* 11.4* ABL 2.3 1.5 1.8 ABM 0.8 0.7 0.5 HUSSAIN 0.0 0.0 0.2 ABB 0.0 0.0 0.1 AIG 0.1 0.1 0.1 Recent Labs 19 
1001  135* 131*  
K 3.8 4.0 2.9*  
 101 93* CO2 23 26 34* AGAP 11 8 4* * 137* 135* BUN 6 9 12 CREA 0.74 0.77 0.94 GFRAA >60 >60 >60 GFRNA >60 >60 >60  
CA 8.4 8.1* 8.9 SGOT  --   --  41* AP  --   --  124 TP  --   --  7.8 ALB  --   --  3.5 GLOB  --   --  4.3* AGRAT  --   --  0.8* Imagin2019 16:11 2019 16:19 Study Result CT abdomen and pelvis dated 1/3/2019 
  
CLINICAL INFORMATION: Right lower quadrant pain, nausea and vomiting 
  
Comparison made with outside CT 2018 
  
Spiral images of the abdomen and pelvis were obtained during intravenous 
injection of 100 cc nonionic contrast. 
  
This study requested and performed no oral contrast. This limits evaluation of 
bowel. 
  
All CT scanners at this facility use one or all of the following:  automated 
exposure control, adjustment of the mA and or kVp according to patient size, 
iterative reconstruction 
  
CT ABDOMEN: 
Upper images show no infiltrate in either lung base. 
  
The spleen is small measuring 2.6 x 1.7 cm. There is diffuse fatty infiltration 
of the liver. Mass is noted at the tail of the pancreas. This measures 3.6 x 2.5 
cm. Overall no definite change.  There are postoperative changes at the stomach. Adrenals are unremarkable. The kidneys are normal in size. Left kidney is 
unremarkable. There are sharply circumscribed areas of decreased contrast 
enhancement in the mid and lower pole the right kidney. Pyelonephritis or 
ischemic change could give this appearance. Abdominal aorta is normal in size. No adenopathy. 
  
CT PELVIS: 
No mass, adenopathy or abnormal fluid collection. There is mild colonic 
diverticulosis. 
  
IMPRESSION IMPRESSION: 
1. No change in a mass in tail of pancreas. 2. Multiple sharply circumscribed areas of decreased enhancement in the mid and 
lower right kidney. Pyelonephritis or ischemic change could give this 
appearance. 3. Diverticulosis Medications: 
Current Facility-Administered Medications Medication Dose Route Frequency  loperamide (IMODIUM) capsule 2 mg  2 mg Oral Q4H PRN  
 ondansetron (ZOFRAN) injection 8 mg  8 mg IntraVENous Q8H  prochlorperazine (COMPAZINE) injection 10 mg  10 mg IntraVENous Q6H PRN  
 LORazepam (ATIVAN) injection 1 mg  1 mg IntraVENous Q4H PRN  
 0.9% sodium chloride infusion  50 mL/hr IntraVENous CONTINUOUS  
 heparin 25,000 units in dextrose 500 mL infusion  18-36 Units/kg/hr IntraVENous TITRATE  aspirin delayed-release tablet 81 mg  81 mg Oral DAILY  atorvastatin (LIPITOR) tablet 20 mg  20 mg Oral QHS  dexamethasone (DECADRON) tablet 2 mg  2 mg Oral BID WITH MEALS  lidocaine-prilocaine (EMLA) 2.5-2.5 % cream   Topical PRN  
 lipase-protease-amylase (CREON 36,000) capsule 2 Cap (Patient Supplied)  2 Cap Oral TID WITH MEALS  lisinopril (PRINIVIL, ZESTRIL) tablet 20 mg  20 mg Oral DAILY  LORazepam (ATIVAN) tablet 2 mg  2 mg Oral Q6H PRN  
 magnesium hydroxide (MILK OF MAGNESIA) 400 mg/5 mL oral suspension 30 mL  30 mL Oral BID  
 oxyCODONE-acetaminophen (PERCOCET 7.5) 7.5-325 mg per tablet 1 Tab  1 Tab Oral Q4H PRN  pantoprazole (PROTONIX) tablet 40 mg  40 mg Oral ACB&D  
  sodium chloride (NS) flush 5-10 mL  5-10 mL IntraVENous Q8H  
 sodium chloride (NS) flush 5-10 mL  5-10 mL IntraVENous PRN  
 acetaminophen (TYLENOL) tablet 650 mg  650 mg Oral Q4H PRN  
 naloxone (NARCAN) injection 0.4 mg  0.4 mg IntraVENous PRN  
 diphenhydrAMINE (BENADRYL) capsule 25 mg  25 mg Oral Q6H PRN  
 bisacodyl (DULCOLAX) tablet 5 mg  5 mg Oral DAILY PRN  
 morphine IR (MS IR) tablet 30 mg  30 mg Oral Q4H PRN Facility-Administered Medications Ordered in Other Encounters Medication Dose Route Frequency  central line flush (saline) syringe 10 mL  10 mL InterCATHeter PRN  
 morphine injection 2 mg  2 mg IntraVENous Q3H PRN  
 
 
ASSESSMENT: 
 
Problem List  Date Reviewed: 1/3/2019 Codes Class Noted * (Principal) Renal infarct ([facility] ICD-10-CM: N28.0 ICD-9-CM: 593.81  1/3/2019 Hypokalemia ICD-10-CM: E87.6 ICD-9-CM: 276.8  1/3/2019 Severe obesity ([facility] ICD-10-CM: E66.01 
ICD-9-CM: 278.01  12/6/2018 Malignant neoplasm of tail of pancreas (HCC) (Chronic) ICD-10-CM: C25.2 ICD-9-CM: 157.2  12/6/2018 Hypercholesterolemia ICD-10-CM: E78.00 ICD-9-CM: 272.0  Unknown Hypertension (Chronic) ICD-10-CM: I10 
ICD-9-CM: 401.9  Unknown PLAN: 
Metastatic pancreatic cancer sp cycle one FOLFIRINOX 12/26/2018 
  
Renal infarct 1/4 on heparin drip. Nephrology and vascular surgeon consulted. Vascular sx felt the image findings were c/w renal infarct. Pt had no fevers, dysuria, oliguria preceding the acute event. 1/5 Echo ordered - no structural abnormalities noted, EF normal. Continue heparin Right flank pain 1/4 resolved  
  
Nausea/vomiting 1/4 continue antiemetics as needed. No appetite/RD consulted. 1/5 worsening of nausea; schedule zofran. Change phenergan to IV compazine and add IV ativan 
  
Electrolyte abnormalities 1/4 corrected K+ and Na+. Continue to monitor. Diarrhea 1/5 check cdiff 
  
 Lab studies and imaging studies were personally reviewed. Pertinent old records were reviewed. Goals and plan of care reviewed with the patient. All questions answered to the best of our ability. Nicole Summers MD 
[facility] Hematology & Oncology [address] [place] [address] Office : [phone] Fax : [phone]

## 2019-01-05 NOTE — PROGRESS NOTES
MYRANDAPharmacist,informed of recent ptt 92.3,that ptt originally supposed to be drawn at 1600.   Stated it was okay to draw another ptt in am.

## 2019-01-06 LAB
ANION GAP SERPL CALC-SCNC: 6 MMOL/L (ref 7–16)
APTT PPP: 102.5 SEC (ref 24.7–39.8)
APTT PPP: 92.2 SEC (ref 24.7–39.8)
APTT PPP: >200 SEC (ref 24.7–39.8)
BUN SERPL-MCNC: 4 MG/DL (ref 6–23)
CALCIUM SERPL-MCNC: 8.5 MG/DL (ref 8.3–10.4)
CHLORIDE SERPL-SCNC: 102 MMOL/L (ref 98–107)
CO2 SERPL-SCNC: 28 MMOL/L (ref 21–32)
CREAT SERPL-MCNC: 0.71 MG/DL (ref 0.6–1)
GLUCOSE SERPL-MCNC: 112 MG/DL (ref 65–100)
MAGNESIUM SERPL-MCNC: 2.1 MG/DL (ref 1.8–2.4)
POTASSIUM SERPL-SCNC: 3.1 MMOL/L (ref 3.5–5.1)
SODIUM SERPL-SCNC: 136 MMOL/L (ref 136–145)

## 2019-01-06 PROCEDURE — 85730 THROMBOPLASTIN TIME PARTIAL: CPT

## 2019-01-06 PROCEDURE — 83735 ASSAY OF MAGNESIUM: CPT

## 2019-01-06 PROCEDURE — 74011250636 HC RX REV CODE- 250/636: Performed by: EMERGENCY MEDICINE

## 2019-01-06 PROCEDURE — 36591 DRAW BLOOD OFF VENOUS DEVICE: CPT | Performed by: NURSE PRACTITIONER

## 2019-01-06 PROCEDURE — 74011250637 HC RX REV CODE- 250/637: Performed by: FAMILY MEDICINE

## 2019-01-06 PROCEDURE — 65270000029 HC RM PRIVATE

## 2019-01-06 PROCEDURE — 74011250636 HC RX REV CODE- 250/636: Performed by: NURSE PRACTITIONER

## 2019-01-06 PROCEDURE — 74011250636 HC RX REV CODE- 250/636: Performed by: FAMILY MEDICINE

## 2019-01-06 PROCEDURE — 99232 SBSQ HOSP IP/OBS MODERATE 35: CPT | Performed by: INTERNAL MEDICINE

## 2019-01-06 PROCEDURE — 36415 COLL VENOUS BLD VENIPUNCTURE: CPT

## 2019-01-06 PROCEDURE — 80048 BASIC METABOLIC PNL TOTAL CA: CPT

## 2019-01-06 PROCEDURE — 74011250636 HC RX REV CODE- 250/636: Performed by: INTERNAL MEDICINE

## 2019-01-06 PROCEDURE — 36591 DRAW BLOOD OFF VENOUS DEVICE: CPT

## 2019-01-06 RX ORDER — POTASSIUM CHLORIDE 29.8 MG/ML
40 INJECTION INTRAVENOUS ONCE
Status: DISCONTINUED | OUTPATIENT
Start: 2019-01-06 | End: 2019-01-06 | Stop reason: SDUPTHER

## 2019-01-06 RX ORDER — POTASSIUM CHLORIDE 14.9 MG/ML
20 INJECTION INTRAVENOUS
Status: COMPLETED | OUTPATIENT
Start: 2019-01-06 | End: 2019-01-06

## 2019-01-06 RX ORDER — HEPARIN 100 UNIT/ML
300 SYRINGE INTRAVENOUS EVERY 8 HOURS
Status: DISCONTINUED | OUTPATIENT
Start: 2019-01-06 | End: 2019-01-07 | Stop reason: HOSPADM

## 2019-01-06 RX ADMIN — ONDANSETRON 8 MG: 2 INJECTION INTRAMUSCULAR; INTRAVENOUS at 16:20

## 2019-01-06 RX ADMIN — POTASSIUM CHLORIDE 20 MEQ: 200 INJECTION, SOLUTION INTRAVENOUS at 09:03

## 2019-01-06 RX ADMIN — LISINOPRIL 20 MG: 20 TABLET ORAL at 07:18

## 2019-01-06 RX ADMIN — ASPIRIN 81 MG: 81 TABLET, COATED ORAL at 07:17

## 2019-01-06 RX ADMIN — HEPARIN SODIUM AND DEXTROSE 16 UNITS/KG/HR: 5000; 5 INJECTION INTRAVENOUS at 05:47

## 2019-01-06 RX ADMIN — PANTOPRAZOLE SODIUM 40 MG: 40 TABLET, DELAYED RELEASE ORAL at 07:16

## 2019-01-06 RX ADMIN — PROCHLORPERAZINE EDISYLATE 10 MG: 5 INJECTION INTRAMUSCULAR; INTRAVENOUS at 19:47

## 2019-01-06 RX ADMIN — Medication 10 ML: at 21:04

## 2019-01-06 RX ADMIN — DEXAMETHASONE 2 MG: 0.5 TABLET ORAL at 16:20

## 2019-01-06 RX ADMIN — ONDANSETRON 8 MG: 2 INJECTION INTRAMUSCULAR; INTRAVENOUS at 07:17

## 2019-01-06 RX ADMIN — SODIUM CHLORIDE 50 ML/HR: 900 INJECTION, SOLUTION INTRAVENOUS at 04:22

## 2019-01-06 RX ADMIN — SODIUM CHLORIDE, PRESERVATIVE FREE 300 UNITS: 5 INJECTION INTRAVENOUS at 21:04

## 2019-01-06 RX ADMIN — PROCHLORPERAZINE EDISYLATE 10 MG: 5 INJECTION INTRAMUSCULAR; INTRAVENOUS at 11:11

## 2019-01-06 RX ADMIN — ONDANSETRON 8 MG: 2 INJECTION INTRAMUSCULAR; INTRAVENOUS at 23:36

## 2019-01-06 RX ADMIN — POTASSIUM CHLORIDE 20 MEQ: 200 INJECTION, SOLUTION INTRAVENOUS at 11:07

## 2019-01-06 RX ADMIN — MORPHINE SULFATE 30 MG: 30 TABLET ORAL at 11:10

## 2019-01-06 RX ADMIN — LORAZEPAM 1 MG: 2 INJECTION, SOLUTION INTRAMUSCULAR; INTRAVENOUS at 07:17

## 2019-01-06 RX ADMIN — Medication 10 ML: at 13:14

## 2019-01-06 RX ADMIN — DEXAMETHASONE 2 MG: 0.5 TABLET ORAL at 07:16

## 2019-01-06 RX ADMIN — MORPHINE SULFATE 30 MG: 30 TABLET ORAL at 01:13

## 2019-01-06 RX ADMIN — SODIUM CHLORIDE, PRESERVATIVE FREE 300 UNITS: 5 INJECTION INTRAVENOUS at 13:16

## 2019-01-06 RX ADMIN — MORPHINE SULFATE 30 MG: 30 TABLET ORAL at 19:47

## 2019-01-06 RX ADMIN — PANTOPRAZOLE SODIUM 40 MG: 40 TABLET, DELAYED RELEASE ORAL at 16:20

## 2019-01-06 NOTE — PROGRESS NOTES
[facility] Hematology & Oncology Inpatient Hematology / Oncology Progress NoteAdmission Date: 1/3/2019  2:51 PM 
Reason for Admission/Hospital Course: Renal infarct ([address] 24 Hour Events: 
Nausea/diarrhea improved - 8 stools in last 24 hours Milk of mag d/c Pain resolved On heparin gtt Vitals stable, afebrile ROS: 
Constitutional: negative for fever, chills. + malaise CV: negative for chest pain, palpitations, edema. Respiratory: negative for dyspnea, cough, wheezing. GI: Positive for N/V/D - improved; no abdominal pain 10 point review of systems is otherwise negative with the exception of the elements mentioned above in the HPI. Allergies Allergen Reactions  Penicillins Unknown (comments) Childhood allergy. OBJECTIVE: 
Patient Vitals for the past 8 hrs: 
 BP Temp Pulse Resp SpO2  
19 1035 134/65 98.2 °F (36.8 °C) 60 18 96 % 19 0648 132/68 98.1 °F (36.7 °C) (!) 57 18 98 % Temp (24hrs), Av.1 °F (36.7 °C), Min:97.5 °F (36.4 °C), Max:98.4 °F (36.9 °C) 
 
 0701 -  1900 In: 240 [P.O.:240] Out: - Physical Exam: 
Constitutional: Well developed, well nourished female in no acute distress at this time, sitting comfortably in the hospital bed. HEENT: Normocephalic and atraumatic. Oropharynx is clear, mucous membranes are moist.  Pupils are equal, round, and reactive to light. Extraocular muscles are intact. Sclerae anicteric. Lymph node No palpable submandibular, cervical  
Skin Warm and dry. No bruising and no rash noted. No erythema. No pallor. Respiratory Lungs are clear to auscultation bilaterally without wheezes, rales or rhonchi, normal air exchange without accessory muscle use. CVS Normal rate, regular rhythm and normal S1 and S2. No murmurs Abdomen Soft, nontender and nondistended, normoactive bowel sounds. Neuro Grossly nonfocal with no obvious sensory or motor deficits. MSK Normal range of motion in general.  No edema and no tenderness. Psych Appropriate mood and affect. Labs: 
   
Recent Labs 19 
03219 
9925 WBC 13.0* 13.1*  
RBC 3.71* 3.78* HGB 10.5* 10.6* HCT 33.0* 32.8* MCV 88.9 86.8 MCH 28.3 28.0 MCHC 31.8 32.3 RDW 15.2* 14.8*  
 404 GRANS 75 82* LYMPH 18 12* MONOS 6 6 EOS 0* 0*  
BASOS 0 0 IG 1 1 DF AUTOMATED AUTOMATED ANEU 9.7* 10.7* ABL 2.3 1.5 ABM 0.8 0.7 HUSSAIN 0.0 0.0 ABB 0.0 0.0 AIG 0.1 0.1 Recent Labs 19 
4745 19 
3691  137 135* K 3.1* 3.8 4.0  
 103 101 CO2 28 23 26 AGAP 6* 11 8 * 126* 137* BUN 4* 6 9 CREA 0.71 0.74 0.77 GFRAA >60 >60 >60 GFRNA >60 >60 >60  
CA 8.5 8.4 8.1*  
MG 2.1  --   --   
 
 
 
Imagin2019 16:11 2019 16:19 Study Result CT abdomen and pelvis dated 1/3/2019 
  
CLINICAL INFORMATION: Right lower quadrant pain, nausea and vomiting 
  
Comparison made with outside CT 2018 
  
Spiral images of the abdomen and pelvis were obtained during intravenous 
injection of 100 cc nonionic contrast. 
  
This study requested and performed no oral contrast. This limits evaluation of 
bowel. 
  
All CT scanners at this facility use one or all of the following:  automated 
exposure control, adjustment of the mA and or kVp according to patient size, 
iterative reconstruction 
  
CT ABDOMEN: 
Upper images show no infiltrate in either lung base. 
  
The spleen is small measuring 2.6 x 1.7 cm. There is diffuse fatty infiltration 
of the liver. Mass is noted at the tail of the pancreas. This measures 3.6 x 2.5 
cm. Overall no definite change. There are postoperative changes at the stomach. Adrenals are unremarkable. The kidneys are normal in size. Left kidney is 
unremarkable.  There are sharply circumscribed areas of decreased contrast 
 enhancement in the mid and lower pole the right kidney. Pyelonephritis or 
ischemic change could give this appearance. Abdominal aorta is normal in size. No adenopathy. 
  
CT PELVIS: 
No mass, adenopathy or abnormal fluid collection. There is mild colonic 
diverticulosis. 
  
IMPRESSION IMPRESSION: 
1. No change in a mass in tail of pancreas. 2. Multiple sharply circumscribed areas of decreased enhancement in the mid and 
lower right kidney. Pyelonephritis or ischemic change could give this 
appearance. 3. Diverticulosis Medications: 
Current Facility-Administered Medications Medication Dose Route Frequency  potassium chloride 20 mEq in 100 ml IVPB  20 mEq IntraVENous Q2H  
 loperamide (IMODIUM) capsule 2 mg  2 mg Oral Q4H PRN  
 ondansetron (ZOFRAN) injection 8 mg  8 mg IntraVENous Q8H  prochlorperazine (COMPAZINE) injection 10 mg  10 mg IntraVENous Q6H PRN  
 LORazepam (ATIVAN) injection 1 mg  1 mg IntraVENous Q4H PRN  
 0.9% sodium chloride infusion  50 mL/hr IntraVENous CONTINUOUS  
 heparin 25,000 units in dextrose 500 mL infusion  18-36 Units/kg/hr IntraVENous TITRATE  aspirin delayed-release tablet 81 mg  81 mg Oral DAILY  atorvastatin (LIPITOR) tablet 20 mg  20 mg Oral QHS  dexamethasone (DECADRON) tablet 2 mg  2 mg Oral BID WITH MEALS  lidocaine-prilocaine (EMLA) 2.5-2.5 % cream   Topical PRN  
 lipase-protease-amylase (CREON 36,000) capsule 2 Cap (Patient Supplied)  2 Cap Oral TID WITH MEALS  lisinopril (PRINIVIL, ZESTRIL) tablet 20 mg  20 mg Oral DAILY  LORazepam (ATIVAN) tablet 2 mg  2 mg Oral Q6H PRN  
 magnesium hydroxide (MILK OF MAGNESIA) 400 mg/5 mL oral suspension 30 mL  30 mL Oral BID  
 oxyCODONE-acetaminophen (PERCOCET 7.5) 7.5-325 mg per tablet 1 Tab  1 Tab Oral Q4H PRN  pantoprazole (PROTONIX) tablet 40 mg  40 mg Oral ACB&D  
 sodium chloride (NS) flush 5-10 mL  5-10 mL IntraVENous Q8H  
  sodium chloride (NS) flush 5-10 mL  5-10 mL IntraVENous PRN  
 acetaminophen (TYLENOL) tablet 650 mg  650 mg Oral Q4H PRN  
 naloxone (NARCAN) injection 0.4 mg  0.4 mg IntraVENous PRN  
 diphenhydrAMINE (BENADRYL) capsule 25 mg  25 mg Oral Q6H PRN  
 bisacodyl (DULCOLAX) tablet 5 mg  5 mg Oral DAILY PRN  
 morphine IR (MS IR) tablet 30 mg  30 mg Oral Q4H PRN Facility-Administered Medications Ordered in Other Encounters Medication Dose Route Frequency  central line flush (saline) syringe 10 mL  10 mL InterCATHeter PRN  
 morphine injection 2 mg  2 mg IntraVENous Q3H PRN  
 
 
ASSESSMENT: 
 
Problem List  Date Reviewed: 1/3/2019 Codes Class Noted * (Principal) Renal infarct ([facility] ICD-10-CM: N28.0 ICD-9-CM: 593.81  1/3/2019 Hypokalemia ICD-10-CM: E87.6 ICD-9-CM: 276.8  1/3/2019 Severe obesity ([facility] ICD-10-CM: E66.01 
ICD-9-CM: 278.01  12/6/2018 Malignant neoplasm of tail of pancreas (HCC) (Chronic) ICD-10-CM: C25.2 ICD-9-CM: 157.2  12/6/2018 Hypercholesterolemia ICD-10-CM: E78.00 ICD-9-CM: 272.0  Unknown Hypertension (Chronic) ICD-10-CM: I10 
ICD-9-CM: 401.9  Unknown PLAN: 
Metastatic pancreatic cancer sp cycle one FOLFIRINOX 12/26/2018 
  
Renal infarct 1/4 on heparin drip. Nephrology and vascular surgeon consulted. Vascular sx felt the image findings were c/w renal infarct. Pt had no fevers, dysuria, oliguria preceding the acute event. 1/5 Echo ordered - no structural abnormalities noted, EF normal. Continue heparin Right flank pain 1/4 resolved  
  
Nausea/vomiting 1/4 continue antiemetics as needed. No appetite/RD consulted. 1/5 worsening of nausea; schedule zofran. Change phenergan to IV compazine and add IV ativan 
1/6 Improved. Continue zofran and antiemetics PRN 
  
Electrolyte abnormalities 1/4 corrected K+ and Na+. Continue to monitor. 1/6 IV potassium today Diarrhea 1/5 check cdiff 1/6 D/c Milk of mag. Unable to test cdiff due to laxatives. Monitor 
  
Lab studies and imaging studies were personally reviewed. Pertinent old records were reviewed. Potential d/c tomorrow if diarrhea improves. Xarelto sent to pharmacy Goals and plan of care reviewed with the patient. All questions answered to the best of our ability. Gwen Lee NP [facility] [id number] [address] Office : [phone] Fax : [phone] Attending Addendum: 
Patient seen with LOREE Perez. Ms Heidy Montoya is a andrzej 66yo woman with newly diagnosed pancreatic cancer currently s/p C1 of FOLFIRINOX. She presented to [facility] for tox check and reported N/V and was found to be dehydrated with electrolyte abnormalities. She was treated in infusion with IVF when she developed acute right flank pain and was brought to [facility] for further evaluation. Her CT showed what seems to be most c/w right renal infarct and she was started on heparin gtt. Today, she is feeling better. Her flank pain is resolved. She continues on heparin without any signs of bleeding. Echo reviewed with the pt. On exam, no arrhythmia noted to suggest afib. She developed diarrhea which is now improving. MOM stopped. She had improvement in last 24 hrs. Upon discharge, she will transition to indefinite anticoagulation with NOAC upon discharge, as pts with active malignancy are at increased risk for thrombosis. Will d/c fluids, as drinking well. I personally performed a face to face diagnostic evaluation on this patient. My findings are as follows: A&ox3, lungs clear, RRR, abdomen benign and no LE edema.     
  
I have reviewed and agree with the care plan.     
  
  
 
  
Anahi Coronado, [facility] [address] Hematology [facility] Oncology [address] [address] Office : [phone] Fax : [phone]

## 2019-01-06 NOTE — PROGRESS NOTES
0648-Bedside report received from Maddison Pacheco RN. Resting in bed. No needs voiced. No s/s of acute distress. 1830-END OF SHIFT NOTE: 
Pt's VSS and is in no acute distress. Pt on heparin drip for renal infarct. Pt's nausea improved today. Intake/Output 01/06 0701 - 01/06 1900 In: 2757 [P.O.:1080; I.V.:419] Out: -   
Voiding: YES Catheter: NO 
Drain:   
 
Stool:  0 occurrences. Stool Assessment Stool Appearance: Watery (01/05/19 0750) Emesis:  0 occurrences. VITAL SIGNS Patient Vitals for the past 12 hrs: 
 Temp Pulse Resp BP SpO2  
01/06/19 1445 97.9 °F (36.6 °C) 65 18 108/58 97 % 01/06/19 1035 98.2 °F (36.8 °C) 60 18 134/65 96 % 01/06/19 0648 98.1 °F (36.7 °C) (!) 57 18 132/68 98 % Pain Assessment Pain 1 Pain Scale 1: Numeric (0 - 10) (01/06/19 1146) Pain Intensity 1: 0 (01/06/19 1146) Patient Stated Pain Goal: 0 (01/06/19 0113) Pain Reassessment 1: Yes (01/06/19 1146) Pain Onset 1: ongoing (01/06/19 0113) Pain Location 1: Abdomen (01/06/19 1111) Pain Orientation 1: Lower;Mid (01/06/19 1111) Pain Description 1: Aching (01/06/19 1111) Pain Intervention(s) 1: Medication (see MAR) (01/06/19 1111) Ambulating Yes Kevin Lindsey 1900-Bedside shift change report given to Marco Phipps RN (oncoming nurse) by Fracnis Cody RN (offgoing nurse). Report included the following information SBAR, Kardex, Intake/Output, MAR and Recent Results.

## 2019-01-06 NOTE — PROGRESS NOTES
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

## 2019-01-07 VITALS
RESPIRATION RATE: 18 BRPM | SYSTOLIC BLOOD PRESSURE: 139 MMHG | DIASTOLIC BLOOD PRESSURE: 75 MMHG | TEMPERATURE: 98.2 F | OXYGEN SATURATION: 97 % | HEART RATE: 72 BPM

## 2019-01-07 LAB
ANION GAP SERPL CALC-SCNC: 5 MMOL/L (ref 7–16)
BASOPHILS # BLD: 0 K/UL (ref 0–0.2)
BASOPHILS NFR BLD: 0 % (ref 0–2)
BUN SERPL-MCNC: 5 MG/DL (ref 6–23)
CALCIUM SERPL-MCNC: 8.6 MG/DL (ref 8.3–10.4)
CHLORIDE SERPL-SCNC: 102 MMOL/L (ref 98–107)
CO2 SERPL-SCNC: 28 MMOL/L (ref 21–32)
CREAT SERPL-MCNC: 0.69 MG/DL (ref 0.6–1)
DIFFERENTIAL METHOD BLD: ABNORMAL
EOSINOPHIL # BLD: 0.1 K/UL (ref 0–0.8)
EOSINOPHIL NFR BLD: 1 % (ref 0.5–7.8)
ERYTHROCYTE [DISTWIDTH] IN BLOOD BY AUTOMATED COUNT: 15.2 % (ref 11.9–14.6)
GLUCOSE SERPL-MCNC: 104 MG/DL (ref 65–100)
HCT VFR BLD AUTO: 29.9 % (ref 35.8–46.3)
HGB BLD-MCNC: 9.5 G/DL (ref 11.7–15.4)
IMM GRANULOCYTES # BLD: 0.1 K/UL (ref 0–0.5)
IMM GRANULOCYTES NFR BLD AUTO: 0 % (ref 0–5)
LYMPHOCYTES # BLD: 2 K/UL (ref 0.5–4.6)
LYMPHOCYTES NFR BLD: 17 % (ref 13–44)
MAGNESIUM SERPL-MCNC: 2 MG/DL (ref 1.8–2.4)
MCH RBC QN AUTO: 28.3 PG (ref 26.1–32.9)
MCHC RBC AUTO-ENTMCNC: 31.8 G/DL (ref 31.4–35)
MCV RBC AUTO: 89 FL (ref 79.6–97.8)
MONOCYTES # BLD: 0.8 K/UL (ref 0.1–1.3)
MONOCYTES NFR BLD: 7 % (ref 4–12)
NEUTS SEG # BLD: 9 K/UL (ref 1.7–8.2)
NEUTS SEG NFR BLD: 76 % (ref 43–78)
NRBC # BLD: 0 K/UL (ref 0–0.2)
PLATELET # BLD AUTO: 400 K/UL (ref 150–450)
PMV BLD AUTO: 10.2 FL (ref 9.4–12.3)
POTASSIUM SERPL-SCNC: 3.3 MMOL/L (ref 3.5–5.1)
RBC # BLD AUTO: 3.36 M/UL (ref 4.05–5.2)
SODIUM SERPL-SCNC: 135 MMOL/L (ref 136–145)
WBC # BLD AUTO: 11.9 K/UL (ref 4.3–11.1)

## 2019-01-07 PROCEDURE — 74011250637 HC RX REV CODE- 250/637: Performed by: FAMILY MEDICINE

## 2019-01-07 PROCEDURE — 85025 COMPLETE CBC W/AUTO DIFF WBC: CPT

## 2019-01-07 PROCEDURE — 99239 HOSP IP/OBS DSCHRG MGMT >30: CPT | Performed by: INTERNAL MEDICINE

## 2019-01-07 PROCEDURE — 74011250636 HC RX REV CODE- 250/636: Performed by: INTERNAL MEDICINE

## 2019-01-07 PROCEDURE — 74011250636 HC RX REV CODE- 250/636: Performed by: NURSE PRACTITIONER

## 2019-01-07 PROCEDURE — 74011250636 HC RX REV CODE- 250/636: Performed by: EMERGENCY MEDICINE

## 2019-01-07 PROCEDURE — 74011250636 HC RX REV CODE- 250/636: Performed by: FAMILY MEDICINE

## 2019-01-07 PROCEDURE — 83735 ASSAY OF MAGNESIUM: CPT

## 2019-01-07 PROCEDURE — 80048 BASIC METABOLIC PNL TOTAL CA: CPT

## 2019-01-07 PROCEDURE — 36591 DRAW BLOOD OFF VENOUS DEVICE: CPT

## 2019-01-07 RX ORDER — POTASSIUM CHLORIDE 20 MEQ/1
20 TABLET, EXTENDED RELEASE ORAL 2 TIMES DAILY
Status: DISCONTINUED | OUTPATIENT
Start: 2019-01-07 | End: 2019-01-07

## 2019-01-07 RX ORDER — POTASSIUM CHLORIDE 29.8 MG/ML
40 INJECTION INTRAVENOUS ONCE
Status: COMPLETED | OUTPATIENT
Start: 2019-01-07 | End: 2019-01-07

## 2019-01-07 RX ADMIN — POTASSIUM CHLORIDE 40 MEQ: 400 INJECTION, SOLUTION INTRAVENOUS at 08:52

## 2019-01-07 RX ADMIN — Medication 10 ML: at 13:55

## 2019-01-07 RX ADMIN — SODIUM CHLORIDE, PRESERVATIVE FREE 300 UNITS: 5 INJECTION INTRAVENOUS at 05:05

## 2019-01-07 RX ADMIN — DEXAMETHASONE 2 MG: 0.5 TABLET ORAL at 07:44

## 2019-01-07 RX ADMIN — MORPHINE SULFATE 30 MG: 30 TABLET ORAL at 07:49

## 2019-01-07 RX ADMIN — PANTOPRAZOLE SODIUM 40 MG: 40 TABLET, DELAYED RELEASE ORAL at 07:44

## 2019-01-07 RX ADMIN — ONDANSETRON 8 MG: 2 INJECTION INTRAMUSCULAR; INTRAVENOUS at 07:44

## 2019-01-07 RX ADMIN — HEPARIN SODIUM AND DEXTROSE 13 UNITS/KG/HR: 5000; 5 INJECTION INTRAVENOUS at 02:56

## 2019-01-07 RX ADMIN — LISINOPRIL 20 MG: 20 TABLET ORAL at 07:44

## 2019-01-07 RX ADMIN — MORPHINE SULFATE 30 MG: 30 TABLET ORAL at 13:55

## 2019-01-07 RX ADMIN — Medication 10 ML: at 05:06

## 2019-01-07 RX ADMIN — SODIUM CHLORIDE, PRESERVATIVE FREE 300 UNITS: 5 INJECTION INTRAVENOUS at 13:55

## 2019-01-07 RX ADMIN — ASPIRIN 81 MG: 81 TABLET, COATED ORAL at 07:45

## 2019-01-07 NOTE — PROGRESS NOTES
Problem: Falls - Risk of 
Goal: *Absence of Falls Document Silas Olmstead Fall Risk and appropriate interventions in the flowsheet. Outcome: Progressing Towards Goal 
Fall Risk Interventions: 
  
 
  
 
Medication Interventions: Teach patient to arise slowly Problem: Pressure Injury - Risk of 
Goal: *Prevention of pressure injury Document Mo Scale and appropriate interventions in the flowsheet. Outcome: Progressing Towards Goal 
Pressure Injury Interventions: Activity Interventions: Pressure redistribution bed/mattress(bed type) Mobility Interventions: Pressure redistribution bed/mattress (bed type) Nutrition Interventions: Discuss nutritional consult with provider, Document food/fluid/supplement intake, Offer support with meals,snacks and hydration

## 2019-01-07 NOTE — DISCHARGE SUMMARY
[facility] Hematology & Oncology: Inpatient Hematology / Oncology Discharge Summary NotePatient ID: 
Mike Warren [id number] 
66 y.o. 
1960 Admit Date: 1/3/2019 Discharge Date: 1/7/2019 Admission Diagnoses: Renal infarct ([address] Discharge Diagnoses: 
Principal Diagnosis: Renal infarct ([address] Principal Problem: 
  Renal infarct ([address] (1/3/2019) Active Problems: Hypertension () Malignant neoplasm of tail of pancreas ([address] (12/6/2018) Hypokalemia (1/3/2019) Hospital Course: Ms. Glen Medina is a 66 y.o. female admitted on 1/3/2019 with a primary diagnosis of renal infarct. She presented to the ED from infusion center 1/3/2019 due to sudden onset of severe rt flank/back pain.   She has metastatic pancreatic cancer and is sp cycle one FOLFINOX on 12/26/2018. She was seen by NP for chemo follow up and was then sent over to infusion center for fluids and replacements. While in infusion center she developed stabbing pain, became diaphoretic and nauseated. RR was called and she received 4 mg morphine, 1 mg ativan, and 1 mg dilaudid with no relief. She was transported to [facility] ER via EMS. CT revealed renal infarct vs pyelonephritis. Per vascular, imaging most c/w renal infarct. She was on heparin gtt while admitted and will transition to Xarelto at discharge. She is feeling well and is ready for discharge after a potassium bolus. She will f/u as previously scheduled with Dr. Jonathon Frias tomorrow, 1/8. Advised to call with fever, chills, uncontrollable symptoms, or with any other concerns. Pt verbalizes understanding. Metastatic pancreatic cancer sp cycle one FOLFIRINOX 12/26/2018 
  
Renal infarct 1/4 on heparin drip. Nephrology and vascular surgeon consulted.  Vascular sx felt the image findings were c/w renal infarct. Pt had no fevers, dysuria, oliguria preceding the acute event.    
1/5 Echo ordered - no structural abnormalities noted, EF normal. Continue heparin 
  
 Right flank pain 1/4 resolved  
  
Nausea/vomiting 1/4 continue antiemetics as needed. No appetite/RD consulted. 1/5 worsening of nausea; schedule zofran. Change phenergan to IV compazine and add IV ativan 
1/6 Improved. Continue zofran and antiemetics PRN 
  
Electrolyte abnormalities 1/4 corrected K+ and Na+. Continue to monitor. 1/6 IV potassium today 
  
Diarrhea 1/5 check cdiff 1/6 D/c Milk of mag. Unable to test cdiff due to laxatives. Monitor Consults: 
IP CONSULT TO ONCOLOGY 
IP CONSULT TO NEPHROLOGY 
IP CONSULT TO VASCULAR SURGERY Pertinent Diagnostic Studies:  
Labs:   
Recent Labs 01/07/19 
0254 01/05/19 
0321 WBC 11.9* 13.0* HGB 9.5* 10.5*  419 ANEU 9.0* 9.7* Recent Labs 01/07/19 
9493 01/06/19 
0528 01/05/19 
0321 * 136 137  
K 3.3* 3.1* 3.8  102 103 CO2 28 28 23 * 112* 126* BUN 5* 4* 6  
CREA 0.69 0.71 0.74 CA 8.6 8.5 8.4 MG 2.0 2.1  --   
 
 
Imaging: 
CT ABD PELV W CONT [[id number]] Collected: 01/03/19 1629 Order Status: Completed Updated: 01/03/19 1640 Narrative:    
CT abdomen and pelvis dated 1/3/2019 CLINICAL INFORMATION: Right lower quadrant pain, nausea and vomiting Comparison made with outside CT 11/8/2018 Spiral images of the abdomen and pelvis were obtained during intravenous 
injection of 100 cc nonionic contrast. 
 
This study requested and performed no oral contrast. This limits evaluation of 
bowel. All CT scanners at this facility use one or all of the following:  automated 
exposure control, adjustment of the mA and or kVp according to patient size, 
iterative reconstruction CT ABDOMEN: 
Upper images show no infiltrate in either lung base. The spleen is small measuring 2.6 x 1.7 cm. There is diffuse fatty infiltration 
of the liver. Mass is noted at the tail of the pancreas. This measures 3.6 x 2.5 
cm. Overall no definite change. There are postoperative changes at the stomach. Adrenals are unremarkable. The kidneys are normal in size. Left kidney is 
unremarkable. There are sharply circumscribed areas of decreased contrast 
enhancement in the mid and lower pole the right kidney. Pyelonephritis or 
ischemic change could give this appearance. Abdominal aorta is normal in size. No adenopathy. CT PELVIS: 
No mass, adenopathy or abnormal fluid collection. There is mild colonic 
diverticulosis. Impression:    
IMPRESSION: 
1. No change in a mass in tail of pancreas. 2. Multiple sharply circumscribed areas of decreased enhancement in the mid and 
lower right kidney. Pyelonephritis or ischemic change could give this 
appearance. 3. Diverticulosis Current Discharge Medication List  
  
START taking these medications Details  
rivaroxaban (XARELTO) 15 mg tab tablet Take 1 Tab by mouth two (2) times daily (with meals) for 21 days. Qty: 42 Tab, Refills: 0 CONTINUE these medications which have NOT CHANGED Details  
dexamethasone (DECADRON) 2 mg tablet Take 1 Tab by mouth two (2) times daily (with meals). Qty: 60 Tab, Refills: 1  
  
ondansetron hcl (ZOFRAN) 8 mg tablet TAKE 1 TABLET BY MOUTH EVERY 8 HOURS AS NEEDED FOR NAUSEA Refills: 2  
  
oxyCODONE-acetaminophen (PERCOCET 7.5) 7.5-325 mg per tablet Take 1 Tab by mouth every four (4) hours as needed. Max Daily Amount: 6 Tabs. Qty: 60 Tab, Refills: 0 Associated Diagnoses: Mass of pancreas LORazepam (ATIVAN) 2 mg tablet Take 1 Tab by mouth every six (6) hours as needed. Max Daily Amount: 8 mg. Qty: 60 Tab, Refills: 1 Associated Diagnoses: Malignant neoplasm of tail of pancreas ([address] aspirin delayed-release 81 mg tablet Take 81 mg by mouth daily. atorvastatin (LIPITOR) 20 mg tablet Take 20 mg by mouth nightly. lipase-protease-amylase (CREON) 36,000-114,000- 180,000 unit cpDR capsule Take 2 capsules with each meal and 1 with snacks. Qty: 126 Cap, Refills: 0 Associated Diagnoses: Pancreatic insufficiency  
  
lidocaine-prilocaine (EMLA) topical cream Apply  to affected area as needed for Pain. Apply to port site 45-60 minutes prior to lab appointment or infusion appointment. Qty: 30 g, Refills: 2  
  
promethazine (PHENERGAN) 25 mg tablet Take 1 Tab by mouth every six (6) hours as needed for Nausea (and vomiting). Qty: 90 Tab, Refills: 2  
  
pantoprazole (PROTONIX) 40 mg tablet Take 1 Tab by mouth Before breakfast and dinner. Qty: 30 Tab, Refills: 2  
  
lisinopril (PRINIVIL, ZESTRIL) 20 mg tablet Take 1 Tab by mouth daily. Qty: 30 Tab, Refills: 6 Associated Diagnoses: Essential hypertension STOP taking these medications  
  
 magnesium hydroxide (BARBOZA MILK OF MAGNESIA) 400 mg/5 mL suspension Comments:  
Reason for Stopping: OBJECTIVE: 
Patient Vitals for the past 8 hrs: 
 BP Temp Pulse Resp SpO2  
19 0744 125/69 99 °F (37.2 °C) 71 18 99 % 19 0346 128/69 98.3 °F (36.8 °C) 63 18 97 % Temp (24hrs), Av.4 °F (36.9 °C), Min:97.9 °F (36.6 °C), Max:99 °F (37.2 °C) 
 
701 -  1900 In: 360 [P.O.:360] Out: - Physical Exam: 
Constitutional: Well developed, well nourished female in no acute distress, sitting comfortably on the hospital bed. HEENT: Normocephalic and atraumatic. Oropharynx is clear, mucous membranes are moist.  Extraocular muscles are intact. Sclerae anicteric. Neck supple without JVD. No thyromegaly present. Lymph node Deferred Skin Warm and dry. No bruising and no rash noted. No erythema. No pallor. Respiratory Lungs are clear to auscultation bilaterally without wheezes, rales or rhonchi, normal air exchange without accessory muscle use. CVS Normal rate, regular rhythm and normal S1 and S2. No murmurs, gallops, or rubs. Abdomen Soft, nontender and nondistended, normoactive bowel sounds. No palpable mass. No hepatosplenomegaly. Neuro Grossly nonfocal with no obvious sensory or motor deficits. MSK Normal range of motion in general.  No edema and no tenderness. Psych Appropriate mood and affect. Anxious, tearful at times. ASSESSMENT: 
 
Principal Problem: 
  Renal infarct ([facility] (1/3/2019) Active Problems: Hypertension () Malignant neoplasm of tail of pancreas ([facility] (12/6/2018) Hypokalemia (1/3/2019) DISPOSITION: 
Follow-up Appointments Procedures  FOLLOW UP VISIT Appointment in: Other (Specify) Follow up as previously scheduled with Dr. Nelli Rodriguez tomorrow, 1/8 Follow up as previously scheduled with Dr. Nelli Rodriguez tomorrow, 1/8 Standing Status:   Standing Number of Occurrences:   1 Order Specific Question:   Appointment in Answer: Other (Specify) Over 30 minutes was spent in discharge planning and coordination of care. Pranay Jaimes NP [facility] Hematology [facility] On[facility] [address] [place] [address] Office : [phone] Fax : [phone] I personally saw, exammed and counselled the patient, and discussed with NP, agree with above history/assessment/plan. 66 y. o.female with newly diagnosed pancreatic cancer currently s/p C1 of FOLFIRINOX.  She presented to [facility] for tox check and reported N/V and was found to be dehydrated with electrolyte abnormalities.  She was treated in infusion with IVF when she developed acute right flank pain and was brought to [facility] for further evaluation. Yane Peed CT showed what seems to be most c/w right renal infarct and she was started on heparin gtt.  She had responded properly and pain controlled. Upon discharge, she will transition to indefinite anticoagulation with xarelto, as pts with active malignancy are at increased risk for thrombosis.  I personally performed a face to face diagnostic evaluation on this patient.  My findings are as follows: A&ox3, lungs clear, RRR, abdomen benign and no LE edema.     
 
 
 
 
 Erika Sen M.D. [address] Office : [phone] Fax : [phone]

## 2019-01-07 NOTE — DISCHARGE INSTRUCTIONS
Patient Education        Hypokalemia: Care Instructions  Your Care Instructions    Hypokalemia (say \"pr-bb-jlm-LIZZY-kyle-uh\") is a low level of potassium. The heart, muscles, kidneys, and nervous system all need potassium to work well. This problem has many different causes. Kidney problems, diet, and medicines like diuretics and laxatives can cause it. So can vomiting or diarrhea. In some cases, cancer is the cause. Your doctor may do tests to find the cause of your low potassium levels. You may need medicines to bring your potassium levels back to normal. You may also need regular blood tests to check your potassium. If you have very low potassium, you may need intravenous (IV) medicines. You also may need tests to check the electrical activity of your heart. Heart problems caused by low potassium levels can be very serious. Follow-up care is a key part of your treatment and safety. Be sure to make and go to all appointments, and call your doctor if you are having problems. It's also a good idea to know your test results and keep a list of the medicines you take. How can you care for yourself at home? · If your doctor recommends it, eat foods that have a lot of potassium. These include fresh fruits, juices, and vegetables. They also include nuts, beans, and milk. · Be safe with medicines. If your doctor prescribes medicines or potassium supplements, take them exactly as directed. Call your doctor if you have any problems with your medicines. · Get your potassium levels tested as often as your doctor tells you. When should you call for help? Call 911 anytime you think you may need emergency care. For example, call if:    · You feel like your heart is missing beats.  Heart problems caused by low potassium can cause death.     · You passed out (lost consciousness).     · You have a seizure.    Call your doctor now or seek immediate medical care if:    · You feel weak or unusually tired.     · You have severe arm or leg cramps.     · You have tingling or numbness.     · You feel sick to your stomach, or you vomit.     · You have belly cramps.     · You feel bloated or constipated.     · You have to urinate a lot.     · You feel very thirsty most of the time.     · You are dizzy or lightheaded, or you feel like you may faint.     · You feel depressed, or you lose touch with reality.    Watch closely for changes in your health, and be sure to contact your doctor if:    · You do not get better as expected. Where can you learn more? Go to http://flip-flower.info/. Enter G358 in the search box to learn more about \"Hypokalemia: Care Instructions. \"  Current as of: March 15, 2018  Content Version: 11.8  © 5997-2159 [employer]. Care instructions adapted under license by [employer] (which disclaims liability or warranty for this information). If you have questions about a medical condition or this instruction, always ask your healthcare professional. [place] [address] any warranty or liability for your use of this information. Acute Kidney Injury: Care Instructions  Your Care Instructions    Acute kidney injury (ANISA) is a sudden decrease in kidney function. This can happen over a period of hours, days or, in some cases, weeks. ANISA used to be called acute renal failure, but kidney failure doesn't always happen with ANISA. Common causes of ANISA are dehydration, blood loss, and medicines. When ANISA happens, the kidneys have trouble removing waste and excess fluids from the body. The waste and fluids build up and become harmful. Kidney function may return to normal if the cause of ANISA is treated quickly. Your chance of a full recovery depends on what caused the problem, how quickly the cause was treated, and what other medical problems you have. A machine may be used to help your kidneys remove waste and fluids for a short period of time.  This is called dialysis. Follow-up care is a key part of your treatment and safety. Be sure to make and go to all appointments, and call your doctor if you are having problems. It's also a good idea to know your test results and keep a list of the medicines you take. How can you care for yourself at home? · Talk to your doctor about how much fluid you should drink. · Eat a balanced diet. Talk to your doctor or a dietitian about what type of diet may be best for you. You may need to limit sodium, potassium, and phosphorus. · If you need dialysis, follow the instructions and schedule for dialysis that your doctor gives you. · Do not smoke. Smoking can make your condition worse. If you need help quitting, talk to your doctor about stop-smoking programs and medicines. These can increase your chances of quitting for good. · Do not drink alcohol. · Review all of your medicines with your doctor. Do not take any medicines, including nonsteroidal anti-inflammatory drugs (NSAIDs) such as ibuprofen (Advil, Motrin) or naproxen (Aleve), unless your doctor says it is safe for you to do so. · Make sure that anyone treating you for any health problem knows that you have had ANISA. When should you call for help? Call 911 anytime you think you may need emergency care. For example, call if:    · You passed out (lost consciousness).    Call your doctor now or seek immediate medical care if:    · You have new or worse nausea and vomiting.     · You have much less urine than normal, or you have no urine.     · You are feeling confused or cannot think clearly.     · You have new or more blood in your urine.     · You have new swelling.     · You are dizzy or lightheaded, or feel like you may faint.    Watch closely for changes in your health, and be sure to contact your doctor if:    · You do not get better as expected. Where can you learn more? Go to http://flip-flower.info/.   Enter X323 in the search box to learn more about \"Acute Kidney Injury: Care Instructions. \"  Current as of: March 15, 2018  Content Version: 11.8  © 3419-0882 [employer]. Care instructions adapted under license by [employer] (which disclaims liability or warranty for this information). If you have questions about a medical condition or this instruction, always ask your healthcare professional. [address] any warranty or liability for your use of this information. DISCHARGE SUMMARY from Nurse    PATIENT INSTRUCTIONS:    After general anesthesia or intravenous sedation, for 24 hours or while taking prescription Narcotics:  · Limit your activities  · Do not drive and operate hazardous machinery  · Do not make important personal or business decisions  · Do  not drink alcoholic beverages  · If you have not urinated within 8 hours after discharge, please contact your surgeon on call. Report the following to your surgeon:  · Excessive pain, swelling, redness or odor of or around the surgical area  · Temperature over 100.5  · Nausea and vomiting lasting longer than 4 hours or if unable to take medications  · Any signs of decreased circulation or nerve impairment to extremity: change in color, persistent  numbness, tingling, coldness or increase pain  · Any questions    What to do at Home:  Recommended activity: Activity as tolerated,     If you experience any of the following symptoms fever greater then 100.5, pain unrelieved by medication, increase in shortness of breath, please follow up with primary care doctor. *  Please give a list of your current medications to your Primary Care Provider. *  Please update this list whenever your medications are discontinued, doses are      changed, or new medications (including over-the-counter products) are added. *  Please carry medication information at all times in case of emergency situations.     These are general instructions for a healthy lifestyle:    No smoking/ No tobacco products/ Avoid exposure to second hand smoke  Surgeon General's Warning:  Quitting smoking now greatly reduces serious risk to your health. Obesity, smoking, and sedentary lifestyle greatly increases your risk for illness    A healthy diet, regular physical exercise & weight monitoring are important for maintaining a healthy lifestyle    You may be retaining fluid if you have a history of heart failure or if you experience any of the following symptoms:  Weight gain of 3 pounds or more overnight or 5 pounds in a week, increased swelling in our hands or feet or shortness of breath while lying flat in bed. Please call your doctor as soon as you notice any of these symptoms; do not wait until your next office visit. Recognize signs and symptoms of STROKE:    F-face looks uneven    A-arms unable to move or move unevenly    S-speech slurred or non-existent    T-time-call 911 as soon as signs and symptoms begin-DO NOT go       Back to bed or wait to see if you get better-TIME IS BRAIN. Warning Signs of HEART ATTACK     Call 911 if you have these symptoms:   Chest discomfort. Most heart attacks involve discomfort in the center of the chest that lasts more than a few minutes, or that goes away and comes back. It can feel like uncomfortable pressure, squeezing, fullness, or pain.  Discomfort in other areas of the upper body. Symptoms can include pain or discomfort in one or both arms, the back, neck, jaw, or stomach.  Shortness of breath with or without chest discomfort.  Other signs may include breaking out in a cold sweat, nausea, or lightheadedness. Don't wait more than five minutes to call 911 - MINUTES MATTER! Fast action can save your life. Calling 911 is almost always the fastest way to get lifesaving treatment. Emergency Medical Services staff can begin treatment when they arrive -- up to an hour sooner than if someone gets to the hospital by car.      The discharge information has been reviewed with the patient. The patient verbalized understanding. Discharge medications reviewed with the patient and appropriate educational materials and side effects teaching were provided.   ___________________________________________________________________________________________________________________________________

## 2019-01-07 NOTE — PROGRESS NOTES
Gave discharge instructions to the pt. The pt voiced a clear understanding, Iv removed from [facility] and site dressed the primary care nurse is aware.

## 2019-01-08 ENCOUNTER — HOSPITAL ENCOUNTER (OUTPATIENT)
Dept: INFUSION THERAPY | Age: 59
Discharge: HOME | End: 2019-01-08
Payer: COMMERCIAL

## 2019-01-08 ENCOUNTER — PATIENT OUTREACH (OUTPATIENT)
Dept: CASE MANAGEMENT | Age: 59
End: 2019-01-08

## 2019-01-08 ENCOUNTER — HOSPITAL ENCOUNTER (OUTPATIENT)
Dept: LAB | Age: 59
Discharge: HOME | End: 2019-01-08
Payer: COMMERCIAL

## 2019-01-08 DIAGNOSIS — C25.2 MALIGNANT NEOPLASM OF TAIL OF PANCREAS (HCC): ICD-10-CM

## 2019-01-08 LAB
ALBUMIN SERPL-MCNC: 2.8 G/DL (ref 3.5–5)
ALBUMIN/GLOB SERPL: 0.7 {RATIO} (ref 1.2–3.5)
ALP SERPL-CCNC: 105 U/L (ref 50–136)
ALT SERPL-CCNC: 48 U/L (ref 12–65)
ANION GAP SERPL CALC-SCNC: 7 MMOL/L (ref 7–16)
AST SERPL-CCNC: 19 U/L (ref 15–37)
BASOPHILS # BLD: 0 K/UL (ref 0–0.2)
BASOPHILS NFR BLD: 0 % (ref 0–2)
BILIRUB SERPL-MCNC: 0.3 MG/DL (ref 0.2–1.1)
BUN SERPL-MCNC: 8 MG/DL (ref 6–23)
CALCIUM SERPL-MCNC: 9 MG/DL (ref 8.3–10.4)
CHLORIDE SERPL-SCNC: 102 MMOL/L (ref 98–107)
CO2 SERPL-SCNC: 28 MMOL/L (ref 21–32)
CREAT SERPL-MCNC: 0.88 MG/DL (ref 0.6–1)
DIFFERENTIAL METHOD BLD: ABNORMAL
EOSINOPHIL # BLD: 0.1 K/UL (ref 0–0.8)
EOSINOPHIL NFR BLD: 1 % (ref 0.5–7.8)
ERYTHROCYTE [DISTWIDTH] IN BLOOD BY AUTOMATED COUNT: 15.3 % (ref 11.9–14.6)
GLOBULIN SER CALC-MCNC: 4.1 G/DL (ref 2.3–3.5)
GLUCOSE SERPL-MCNC: 132 MG/DL (ref 65–100)
HCT VFR BLD AUTO: 33.2 % (ref 35.8–46.3)
HGB BLD-MCNC: 10.5 G/DL (ref 11.7–15.4)
IMM GRANULOCYTES # BLD: 0 K/UL (ref 0–0.5)
IMM GRANULOCYTES NFR BLD AUTO: 0 % (ref 0–5)
LYMPHOCYTES # BLD: 1.1 K/UL (ref 0.5–4.6)
LYMPHOCYTES NFR BLD: 12 % (ref 13–44)
MAGNESIUM SERPL-MCNC: 1.9 MG/DL (ref 1.8–2.4)
MCH RBC QN AUTO: 27.3 PG (ref 26.1–32.9)
MCHC RBC AUTO-ENTMCNC: 31.6 G/DL (ref 31.4–35)
MCV RBC AUTO: 86.2 FL (ref 79.6–97.8)
MONOCYTES # BLD: 1.2 K/UL (ref 0.1–1.3)
MONOCYTES NFR BLD: 13 % (ref 4–12)
NEUTS SEG # BLD: 6.7 K/UL (ref 1.7–8.2)
NEUTS SEG NFR BLD: 74 % (ref 43–78)
NRBC # BLD: 0 K/UL (ref 0–0.2)
PLATELET # BLD AUTO: 435 K/UL (ref 150–450)
PMV BLD AUTO: 9.7 FL (ref 9.4–12.3)
POTASSIUM SERPL-SCNC: 3.2 MMOL/L (ref 3.5–5.1)
PROT SERPL-MCNC: 6.9 G/DL (ref 6.3–8.2)
RBC # BLD AUTO: 3.85 M/UL (ref 4.05–5.25)
SODIUM SERPL-SCNC: 137 MMOL/L (ref 136–145)
WBC # BLD AUTO: 9.1 K/UL (ref 4.3–11.1)

## 2019-01-08 PROCEDURE — 83735 ASSAY OF MAGNESIUM: CPT

## 2019-01-08 PROCEDURE — 85025 COMPLETE CBC W/AUTO DIFF WBC: CPT

## 2019-01-08 PROCEDURE — 80053 COMPREHEN METABOLIC PANEL: CPT

## 2019-01-10 ENCOUNTER — APPOINTMENT (OUTPATIENT)
Dept: INFUSION THERAPY | Age: 59
End: 2019-01-10
Payer: COMMERCIAL

## 2019-01-15 ENCOUNTER — HOSPITAL ENCOUNTER (OUTPATIENT)
Dept: LAB | Age: 59
Discharge: HOME | End: 2019-01-15
Payer: COMMERCIAL

## 2019-01-15 ENCOUNTER — HOSPITAL ENCOUNTER (OUTPATIENT)
Dept: INFUSION THERAPY | Age: 59
Discharge: HOME | End: 2019-01-15
Payer: COMMERCIAL

## 2019-01-15 VITALS
OXYGEN SATURATION: 96 % | SYSTOLIC BLOOD PRESSURE: 125 MMHG | RESPIRATION RATE: 18 BRPM | HEART RATE: 68 BPM | DIASTOLIC BLOOD PRESSURE: 76 MMHG

## 2019-01-15 DIAGNOSIS — C25.2 MALIGNANT NEOPLASM OF TAIL OF PANCREAS (HCC): Primary | ICD-10-CM

## 2019-01-15 DIAGNOSIS — C25.2 MALIGNANT NEOPLASM OF TAIL OF PANCREAS (HCC): Chronic | ICD-10-CM

## 2019-01-15 LAB
ALBUMIN SERPL-MCNC: 2.8 G/DL (ref 3.5–5)
ALBUMIN/GLOB SERPL: 0.7 {RATIO} (ref 1.2–3.5)
ALP SERPL-CCNC: 94 U/L (ref 50–136)
ALT SERPL-CCNC: 41 U/L (ref 12–65)
ANION GAP SERPL CALC-SCNC: 5 MMOL/L (ref 7–16)
AST SERPL-CCNC: 23 U/L (ref 15–37)
BASOPHILS # BLD: 0.3 K/UL (ref 0–0.2)
BASOPHILS NFR BLD: 1 % (ref 0–2)
BILIRUB SERPL-MCNC: 0.1 MG/DL (ref 0.2–1.1)
BUN SERPL-MCNC: 16 MG/DL (ref 6–23)
CALCIUM SERPL-MCNC: 8.9 MG/DL (ref 8.3–10.4)
CANCER AG19-9 SERPL-ACNC: 4462.1 U/ML (ref 2–37)
CHLORIDE SERPL-SCNC: 99 MMOL/L (ref 98–107)
CO2 SERPL-SCNC: 30 MMOL/L (ref 21–32)
CREAT SERPL-MCNC: 1 MG/DL (ref 0.6–1)
DIFFERENTIAL METHOD BLD: ABNORMAL
EOSINOPHIL # BLD: 0.3 K/UL (ref 0–0.8)
EOSINOPHIL NFR BLD: 1 % (ref 0.5–7.8)
ERYTHROCYTE [DISTWIDTH] IN BLOOD BY AUTOMATED COUNT: 15.8 % (ref 11.9–14.6)
GLOBULIN SER CALC-MCNC: 4 G/DL (ref 2.3–3.5)
GLUCOSE SERPL-MCNC: 112 MG/DL (ref 65–100)
HCT VFR BLD AUTO: 32.9 % (ref 35.8–46.3)
HGB BLD-MCNC: 10.6 G/DL (ref 11.7–15.4)
IMM GRANULOCYTES # BLD AUTO: 1.5 K/UL (ref 0–0.5)
IMM GRANULOCYTES NFR BLD AUTO: 6 % (ref 0–5)
LYMPHOCYTES # BLD: 5 K/UL (ref 0.5–4.6)
LYMPHOCYTES NFR BLD: 20 % (ref 13–44)
MAGNESIUM SERPL-MCNC: 2.6 MG/DL (ref 1.8–2.4)
MCH RBC QN AUTO: 27.7 PG (ref 26.1–32.9)
MCHC RBC AUTO-ENTMCNC: 32.2 G/DL (ref 31.4–35)
MCV RBC AUTO: 85.9 FL (ref 79.6–97.8)
MONOCYTES # BLD: 2.3 K/UL (ref 0.1–1.3)
MONOCYTES NFR BLD: 9 % (ref 4–12)
NEUTS SEG # BLD: 15.8 K/UL (ref 1.7–8.2)
NEUTS SEG NFR BLD: 63 % (ref 43–78)
NRBC # BLD: 0 K/UL (ref 0–0.2)
PLATELET # BLD AUTO: 895 K/UL (ref 150–450)
PLATELET COMMENTS,PCOM: ABNORMAL
PMV BLD AUTO: 8.9 FL (ref 9.4–12.3)
POTASSIUM SERPL-SCNC: 3.4 MMOL/L (ref 3.5–5.1)
PROT SERPL-MCNC: 6.8 G/DL (ref 6.3–8.2)
RBC # BLD AUTO: 3.83 M/UL (ref 4.05–5.25)
RBC MORPH BLD: ABNORMAL
SODIUM SERPL-SCNC: 134 MMOL/L (ref 136–145)
WBC # BLD AUTO: 25.2 K/UL (ref 4.3–11.1)
WBC MORPH BLD: ABNORMAL

## 2019-01-15 PROCEDURE — 74011250636 HC RX REV CODE- 250/636: Performed by: INTERNAL MEDICINE

## 2019-01-15 PROCEDURE — 80053 COMPREHEN METABOLIC PANEL: CPT

## 2019-01-15 PROCEDURE — 96417 CHEMO IV INFUS EACH ADDL SEQ: CPT

## 2019-01-15 PROCEDURE — 83735 ASSAY OF MAGNESIUM: CPT

## 2019-01-15 PROCEDURE — 74011000258 HC RX REV CODE- 258: Performed by: INTERNAL MEDICINE

## 2019-01-15 PROCEDURE — 74011250636 HC RX REV CODE- 250/636

## 2019-01-15 PROCEDURE — 85025 COMPLETE CBC W/AUTO DIFF WBC: CPT

## 2019-01-15 PROCEDURE — 96415 CHEMO IV INFUSION ADDL HR: CPT

## 2019-01-15 PROCEDURE — 96411 CHEMO IV PUSH ADDL DRUG: CPT

## 2019-01-15 PROCEDURE — 96413 CHEMO IV INFUSION 1 HR: CPT

## 2019-01-15 PROCEDURE — 86301 IMMUNOASSAY TUMOR CA 19-9: CPT

## 2019-01-15 PROCEDURE — 96372 THER/PROPH/DIAG INJ SC/IM: CPT

## 2019-01-15 PROCEDURE — 96368 THER/DIAG CONCURRENT INF: CPT

## 2019-01-15 PROCEDURE — 96375 TX/PRO/DX INJ NEW DRUG ADDON: CPT

## 2019-01-15 RX ORDER — ONDANSETRON 2 MG/ML
8 INJECTION INTRAMUSCULAR; INTRAVENOUS ONCE
Status: COMPLETED | OUTPATIENT
Start: 2019-01-15 | End: 2019-01-15

## 2019-01-15 RX ORDER — ATROPINE SULFATE 0.4 MG/ML
0.4 INJECTION, SOLUTION ENDOTRACHEAL; INTRAMEDULLARY; INTRAMUSCULAR; INTRAVENOUS; SUBCUTANEOUS ONCE
Status: COMPLETED | OUTPATIENT
Start: 2019-01-15 | End: 2019-01-15

## 2019-01-15 RX ORDER — DEXTROSE MONOHYDRATE 50 MG/ML
25 INJECTION, SOLUTION INTRAVENOUS CONTINUOUS
Status: ACTIVE | OUTPATIENT
Start: 2019-01-15 | End: 2019-01-15

## 2019-01-15 RX ORDER — FLUOROURACIL 50 MG/ML
400 INJECTION, SOLUTION INTRAVENOUS ONCE
Status: COMPLETED | OUTPATIENT
Start: 2019-01-15 | End: 2019-01-15

## 2019-01-15 RX ORDER — SODIUM CHLORIDE 0.9 % (FLUSH) 0.9 %
10 SYRINGE (ML) INJECTION AS NEEDED
Status: ACTIVE | OUTPATIENT
Start: 2019-01-15 | End: 2019-01-15

## 2019-01-15 RX ADMIN — LEUCOVORIN CALCIUM 820 MG: 350 INJECTION, POWDER, LYOPHILIZED, FOR SOLUTION INTRAMUSCULAR; INTRAVENOUS at 12:55

## 2019-01-15 RX ADMIN — FLUOROURACIL 820 MG: 50 INJECTION, SOLUTION INTRAVENOUS at 14:26

## 2019-01-15 RX ADMIN — IRINOTECAN HYDROCHLORIDE 360 MG: 20 INJECTION, SOLUTION INTRAVENOUS at 12:55

## 2019-01-15 RX ADMIN — Medication 10 ML: at 10:03

## 2019-01-15 RX ADMIN — DEXAMETHASONE SODIUM PHOSPHATE 12 MG: 4 INJECTION, SOLUTION INTRAMUSCULAR; INTRAVENOUS at 10:16

## 2019-01-15 RX ADMIN — DEXTROSE MONOHYDRATE 25 ML/HR: 5 INJECTION, SOLUTION INTRAVENOUS at 10:03

## 2019-01-15 RX ADMIN — ATROPINE SULFATE 0.4 MG: 0.4 INJECTION, SOLUTION INTRAMUSCULAR; INTRAVENOUS; SUBCUTANEOUS at 10:26

## 2019-01-15 RX ADMIN — OXALIPLATIN 174.5 MG: 5 INJECTION, SOLUTION, CONCENTRATE INTRAVENOUS at 10:50

## 2019-01-15 RX ADMIN — ONDANSETRON 8 MG: 2 INJECTION, SOLUTION INTRAMUSCULAR; INTRAVENOUS at 10:14

## 2019-01-15 NOTE — PROGRESS NOTES
Pt ambulatory to area without complaints. Received chemo treatment per order, tolerated well. Chemo pump infusing at discharge. Aware of next appt on [email or web address]. Advised to call dr with any issues/concerns. Discharged home without complaints.

## 2019-01-17 ENCOUNTER — HOSPITAL ENCOUNTER (OUTPATIENT)
Dept: INFUSION THERAPY | Age: 59
Discharge: HOME | End: 2019-01-17
Payer: COMMERCIAL

## 2019-01-17 VITALS
DIASTOLIC BLOOD PRESSURE: 56 MMHG | HEART RATE: 89 BPM | WEIGHT: 186 LBS | TEMPERATURE: 98.1 F | RESPIRATION RATE: 18 BRPM | SYSTOLIC BLOOD PRESSURE: 133 MMHG | OXYGEN SATURATION: 97 % | BODY MASS INDEX: 34.02 KG/M2

## 2019-01-17 DIAGNOSIS — C25.2 MALIGNANT NEOPLASM OF TAIL OF PANCREAS (HCC): ICD-10-CM

## 2019-01-17 DIAGNOSIS — R11.2 CINV (CHEMOTHERAPY-INDUCED NAUSEA AND VOMITING): Primary | ICD-10-CM

## 2019-01-17 DIAGNOSIS — T45.1X5A CINV (CHEMOTHERAPY-INDUCED NAUSEA AND VOMITING): Primary | ICD-10-CM

## 2019-01-17 PROCEDURE — 96361 HYDRATE IV INFUSION ADD-ON: CPT

## 2019-01-17 PROCEDURE — 74011000258 HC RX REV CODE- 258: Performed by: NURSE PRACTITIONER

## 2019-01-17 PROCEDURE — 74011250636 HC RX REV CODE- 250/636: Performed by: INTERNAL MEDICINE

## 2019-01-17 PROCEDURE — 96365 THER/PROPH/DIAG IV INF INIT: CPT

## 2019-01-17 PROCEDURE — 74011250636 HC RX REV CODE- 250/636: Performed by: NURSE PRACTITIONER

## 2019-01-17 PROCEDURE — 96375 TX/PRO/DX INJ NEW DRUG ADDON: CPT

## 2019-01-17 RX ORDER — ONDANSETRON 2 MG/ML
8 INJECTION INTRAMUSCULAR; INTRAVENOUS ONCE
Status: COMPLETED | OUTPATIENT
Start: 2019-01-17 | End: 2019-01-17

## 2019-01-17 RX ORDER — FOSAPREPITANT 150 MG/5ML
150 INJECTION, POWDER, LYOPHILIZED, FOR SOLUTION INTRAVENOUS ONCE
Status: DISCONTINUED | OUTPATIENT
Start: 2019-01-17 | End: 2019-01-17

## 2019-01-17 RX ORDER — DEXAMETHASONE SODIUM PHOSPHATE 100 MG/10ML
10 INJECTION INTRAMUSCULAR; INTRAVENOUS ONCE
Status: COMPLETED | OUTPATIENT
Start: 2019-01-17 | End: 2019-01-17

## 2019-01-17 RX ORDER — SODIUM CHLORIDE 0.9 % (FLUSH) 0.9 %
10-30 SYRINGE (ML) INJECTION AS NEEDED
Status: DISCONTINUED | OUTPATIENT
Start: 2019-01-17 | End: 2019-01-21 | Stop reason: HOSPADM

## 2019-01-17 RX ORDER — HEPARIN 100 UNIT/ML
500 SYRINGE INTRAVENOUS AS NEEDED
Status: DISPENSED | OUTPATIENT
Start: 2019-01-17 | End: 2019-01-17

## 2019-01-17 RX ADMIN — ONDANSETRON 8 MG: 2 INJECTION, SOLUTION INTRAMUSCULAR; INTRAVENOUS at 14:20

## 2019-01-17 RX ADMIN — DEXAMETHASONE SODIUM PHOSPHATE 10 MG: 10 INJECTION INTRAMUSCULAR; INTRAVENOUS at 14:22

## 2019-01-17 RX ADMIN — SODIUM CHLORIDE, PRESERVATIVE FREE 500 UNITS: 5 INJECTION INTRAVENOUS at 15:58

## 2019-01-17 RX ADMIN — SODIUM CHLORIDE 150 MG: 900 INJECTION, SOLUTION INTRAVENOUS at 14:30

## 2019-01-17 RX ADMIN — SODIUM CHLORIDE 1000 ML: 900 INJECTION, SOLUTION INTRAVENOUS at 14:55

## 2019-01-17 RX ADMIN — Medication 20 ML: at 14:00

## 2019-01-17 NOTE — PROGRESS NOTES
Arrived to the [place] [id number] FU pump d/c'ed,also received 1 liter of NS and IV Zofran,Decadron,and Emend .  Patient tolerated well   Any issues or concerns during appointment: no  Patient aware of next infusion appointment on Tuesday,January 29th @ 1030  Discharged home ambulatory with

## 2019-01-18 ENCOUNTER — APPOINTMENT (OUTPATIENT)
Dept: INFUSION THERAPY | Age: 59
End: 2019-01-18
Payer: COMMERCIAL

## 2019-01-22 ENCOUNTER — APPOINTMENT (OUTPATIENT)
Dept: INFUSION THERAPY | Age: 59
End: 2019-01-22
Payer: COMMERCIAL

## 2019-01-24 ENCOUNTER — APPOINTMENT (OUTPATIENT)
Dept: INFUSION THERAPY | Age: 59
End: 2019-01-24
Payer: COMMERCIAL

## 2019-01-25 ENCOUNTER — PATIENT OUTREACH (OUTPATIENT)
Dept: CASE MANAGEMENT | Age: 59
End: 2019-01-25

## 2019-01-25 ENCOUNTER — HOSPITAL ENCOUNTER (OUTPATIENT)
Dept: LAB | Age: 59
Discharge: HOME | End: 2019-01-25
Payer: COMMERCIAL

## 2019-01-25 DIAGNOSIS — C25.2 MALIGNANT NEOPLASM OF TAIL OF PANCREAS (HCC): Chronic | ICD-10-CM

## 2019-01-25 LAB
ALBUMIN SERPL-MCNC: 3 G/DL (ref 3.5–5)
ALBUMIN/GLOB SERPL: 0.8 {RATIO} (ref 1.2–3.5)
ALP SERPL-CCNC: 104 U/L (ref 50–136)
ALT SERPL-CCNC: 34 U/L (ref 12–65)
ANION GAP SERPL CALC-SCNC: 9 MMOL/L (ref 7–16)
AST SERPL-CCNC: 21 U/L (ref 15–37)
BASOPHILS # BLD: 0 K/UL (ref 0–0.2)
BASOPHILS NFR BLD: 0 % (ref 0–2)
BILIRUB SERPL-MCNC: 0.1 MG/DL (ref 0.2–1.1)
BUN SERPL-MCNC: 10 MG/DL (ref 6–23)
CALCIUM SERPL-MCNC: 9 MG/DL (ref 8.3–10.4)
CANCER AG19-9 SERPL-ACNC: 6634.7 U/ML (ref 2–37)
CHLORIDE SERPL-SCNC: 98 MMOL/L (ref 98–107)
CO2 SERPL-SCNC: 27 MMOL/L (ref 21–32)
CREAT SERPL-MCNC: 0.92 MG/DL (ref 0.6–1)
DIFFERENTIAL METHOD BLD: ABNORMAL
EOSINOPHIL # BLD: 0.1 K/UL (ref 0–0.8)
EOSINOPHIL NFR BLD: 1 % (ref 0.5–7.8)
ERYTHROCYTE [DISTWIDTH] IN BLOOD BY AUTOMATED COUNT: 15.6 % (ref 11.9–14.6)
GLOBULIN SER CALC-MCNC: 4 G/DL (ref 2.3–3.5)
GLUCOSE SERPL-MCNC: 166 MG/DL (ref 65–100)
HCT VFR BLD AUTO: 29.6 % (ref 35.8–46.3)
HGB BLD-MCNC: 9.6 G/DL (ref 11.7–15.4)
IMM GRANULOCYTES # BLD AUTO: 0 K/UL (ref 0–0.5)
IMM GRANULOCYTES NFR BLD AUTO: 0 % (ref 0–5)
LYMPHOCYTES # BLD: 0.9 K/UL (ref 0.5–4.6)
LYMPHOCYTES NFR BLD: 11 % (ref 13–44)
MAGNESIUM SERPL-MCNC: 2.7 MG/DL (ref 1.8–2.4)
MCH RBC QN AUTO: 27 PG (ref 26.1–32.9)
MCHC RBC AUTO-ENTMCNC: 32.4 G/DL (ref 31.4–35)
MCV RBC AUTO: 83.1 FL (ref 79.6–97.8)
MONOCYTES # BLD: 0.2 K/UL (ref 0.1–1.3)
MONOCYTES NFR BLD: 2 % (ref 4–12)
NEUTS SEG # BLD: 6.9 K/UL (ref 1.7–8.2)
NEUTS SEG NFR BLD: 85 % (ref 43–78)
NRBC # BLD: 0 K/UL (ref 0–0.2)
PLATELET # BLD AUTO: 426 K/UL (ref 150–450)
PMV BLD AUTO: 9 FL (ref 9.4–12.3)
POTASSIUM SERPL-SCNC: 3 MMOL/L (ref 3.5–5.1)
PROT SERPL-MCNC: 7 G/DL (ref 6.3–8.2)
RBC # BLD AUTO: 3.56 M/UL (ref 4.05–5.25)
SODIUM SERPL-SCNC: 134 MMOL/L (ref 136–145)
WBC # BLD AUTO: 8.1 K/UL (ref 4.3–11.1)

## 2019-01-25 PROCEDURE — 86301 IMMUNOASSAY TUMOR CA 19-9: CPT

## 2019-01-25 PROCEDURE — 83735 ASSAY OF MAGNESIUM: CPT

## 2019-01-25 PROCEDURE — 85025 COMPLETE CBC W/AUTO DIFF WBC: CPT

## 2019-01-25 PROCEDURE — 80053 COMPREHEN METABOLIC PANEL: CPT

## 2019-01-25 NOTE — PROGRESS NOTES
1/25/19 pt called to report she is thinking about stopping chemo. Side effects are too much for her to want to continue. Nausea was issue with cycle 1 and oral thrush with cycle 2. Also continues to have weight loss. She is due for next cycle on 1/29. Explained that it would be best to come in and talk with Dr. Yulissa Boo. There were several things we could possibly do to manage symptoms better or adjust chemo. Discussed with Dr. Yulissa Boo. Will arrange for pt to come in today to discuss how to proceed.

## 2019-01-25 NOTE — PROGRESS NOTES
1/25/19 saw pt today with Dr. Kaveh Lobato to discuss how to proceed with treatment. She is scheduled for cycle 3 FOLFIRINOX on 1/29. Cycle 1 was issues with nausea and cycle 2 was severe oral thrush. These symptoms are resolved/controlled now. At this point she does not want to proceed with any further treatment. Discussed potential treatment options - 1. Dose reduce current regimen, 2. Change to a easier regimen, or 3. No treatment at all. We want to give her a little time to recover and start feeling better. Follow up on 2/5 to discuss final plans. xarelto starter pack completed, will start 20 mg daily. Having FC look into insurance issues with oxycodone only being approved for 7 days at a time. Encouraged to call with any concerns. Navigation will continue to follow.

## 2019-01-29 ENCOUNTER — HOSPITAL ENCOUNTER (OUTPATIENT)
Dept: INFUSION THERAPY | Age: 59
End: 2019-01-29
Payer: COMMERCIAL

## 2019-01-31 ENCOUNTER — APPOINTMENT (OUTPATIENT)
Dept: INFUSION THERAPY | Age: 59
End: 2019-01-31
Payer: COMMERCIAL

## 2019-02-05 ENCOUNTER — HOSPITAL ENCOUNTER (OUTPATIENT)
Dept: LAB | Age: 59
Discharge: HOME | End: 2019-02-05
Payer: COMMERCIAL

## 2019-02-05 ENCOUNTER — PATIENT OUTREACH (OUTPATIENT)
Dept: CASE MANAGEMENT | Age: 59
End: 2019-02-05

## 2019-02-05 DIAGNOSIS — C25.2 MALIGNANT NEOPLASM OF TAIL OF PANCREAS (HCC): Chronic | ICD-10-CM

## 2019-02-05 LAB
ALBUMIN SERPL-MCNC: 2.6 G/DL (ref 3.5–5)
ALBUMIN/GLOB SERPL: 0.7 {RATIO} (ref 1.2–3.5)
ALP SERPL-CCNC: 129 U/L (ref 50–136)
ALT SERPL-CCNC: 27 U/L (ref 12–65)
ANION GAP SERPL CALC-SCNC: 4 MMOL/L (ref 7–16)
AST SERPL-CCNC: 20 U/L (ref 15–37)
BASOPHILS # BLD: 0.2 K/UL (ref 0–0.2)
BASOPHILS NFR BLD: 1 % (ref 0–2)
BILIRUB SERPL-MCNC: 0.1 MG/DL (ref 0.2–1.1)
BUN SERPL-MCNC: 10 MG/DL (ref 6–23)
CALCIUM SERPL-MCNC: 8.7 MG/DL (ref 8.3–10.4)
CHLORIDE SERPL-SCNC: 99 MMOL/L (ref 98–107)
CO2 SERPL-SCNC: 35 MMOL/L (ref 21–32)
CREAT SERPL-MCNC: 0.84 MG/DL (ref 0.6–1)
DIFFERENTIAL METHOD BLD: ABNORMAL
EOSINOPHIL # BLD: 0.2 K/UL (ref 0–0.8)
EOSINOPHIL NFR BLD: 1 % (ref 0.5–7.8)
ERYTHROCYTE [DISTWIDTH] IN BLOOD BY AUTOMATED COUNT: 16.9 % (ref 11.9–14.6)
GLOBULIN SER CALC-MCNC: 3.9 G/DL (ref 2.3–3.5)
GLUCOSE SERPL-MCNC: 94 MG/DL (ref 65–100)
HCT VFR BLD AUTO: 30.9 % (ref 35.8–46.3)
HGB BLD-MCNC: 9.7 G/DL (ref 11.7–15.4)
IMM GRANULOCYTES # BLD AUTO: 2 K/UL (ref 0–0.5)
IMM GRANULOCYTES NFR BLD AUTO: 8 % (ref 0–5)
LYMPHOCYTES # BLD: 6.4 K/UL (ref 0.5–4.6)
LYMPHOCYTES NFR BLD: 26 % (ref 13–44)
MCH RBC QN AUTO: 26.5 PG (ref 26.1–32.9)
MCHC RBC AUTO-ENTMCNC: 31.4 G/DL (ref 31.4–35)
MCV RBC AUTO: 84.4 FL (ref 79.6–97.8)
MONOCYTES # BLD: 2 K/UL (ref 0.1–1.3)
MONOCYTES NFR BLD: 8 % (ref 4–12)
NEUTS SEG # BLD: 14 K/UL (ref 1.7–8.2)
NEUTS SEG NFR BLD: 56 % (ref 43–78)
NRBC # BLD: 0.38 K/UL (ref 0–0.2)
PLATELET # BLD AUTO: 679 K/UL (ref 150–450)
PLATELET COMMENTS,PCOM: ABNORMAL
PMV BLD AUTO: 8.5 FL (ref 9.4–12.3)
POTASSIUM SERPL-SCNC: 3.2 MMOL/L (ref 3.5–5.1)
PROT SERPL-MCNC: 6.5 G/DL (ref 6.3–8.2)
RBC # BLD AUTO: 3.66 M/UL (ref 4.05–5.25)
RBC MORPH BLD: ABNORMAL
SODIUM SERPL-SCNC: 138 MMOL/L (ref 136–145)
WBC # BLD AUTO: 24.8 K/UL (ref 4.3–11.1)
WBC MORPH BLD: ABNORMAL

## 2019-02-05 PROCEDURE — 85025 COMPLETE CBC W/AUTO DIFF WBC: CPT

## 2019-02-05 PROCEDURE — 80053 COMPREHEN METABOLIC PANEL: CPT

## 2019-02-05 NOTE — PROGRESS NOTES
2/5/19 saw pt today with Dr. Imani Mckeon for follow up pancreatic cancer. She does not want to do any more chemo but would like to continue to be seen by Dr. Imani Mckeon. PO intake is improving, weight up a few pounds. Will do rad onc referral to see if they would be able to do any radiation. Pain control - will start duragesic 12 mcg patch. Follow up in 4 weeks including palliative care. Encouraged to call with any concerns. Since pt is not pursuing treatment navigation will sign off.

## 2019-02-12 ENCOUNTER — APPOINTMENT (OUTPATIENT)
Dept: INFUSION THERAPY | Age: 59
End: 2019-02-12

## 2019-02-14 ENCOUNTER — HOSPITAL ENCOUNTER (OUTPATIENT)
Dept: INFUSION THERAPY | Age: 59
End: 2019-02-14

## 2019-02-18 ENCOUNTER — HOSPITAL ENCOUNTER (OUTPATIENT)
Dept: PET IMAGING | Age: 59
Discharge: HOME | End: 2019-02-18
Payer: COMMERCIAL

## 2019-02-18 DIAGNOSIS — C25.2 MALIGNANT NEOPLASM OF TAIL OF PANCREAS (HCC): ICD-10-CM

## 2019-02-18 PROCEDURE — A9552 F18 FDG: HCPCS

## 2019-02-18 PROCEDURE — 74011636320 HC RX REV CODE- 636/320: Performed by: RADIOLOGY

## 2019-02-18 RX ORDER — SODIUM CHLORIDE 0.9 % (FLUSH) 0.9 %
10 SYRINGE (ML) INJECTION
Status: COMPLETED | OUTPATIENT
Start: 2019-02-18 | End: 2019-02-18

## 2019-02-18 RX ADMIN — Medication 10 ML: at 07:16

## 2019-02-18 RX ADMIN — DIATRIZOATE MEGLUMINE AND DIATRIZOATE SODIUM 10 ML: 660; 100 LIQUID ORAL; RECTAL at 08:00

## 2019-03-05 ENCOUNTER — HOSPITAL ENCOUNTER (OUTPATIENT)
Dept: LAB | Age: 59
Discharge: HOME | End: 2019-03-05
Payer: COMMERCIAL

## 2019-03-05 ENCOUNTER — HOSPITAL ENCOUNTER (OUTPATIENT)
Dept: RADIATION ONCOLOGY | Age: 59
Discharge: HOME | End: 2019-03-05
Payer: COMMERCIAL

## 2019-03-05 VITALS
OXYGEN SATURATION: 99 % | SYSTOLIC BLOOD PRESSURE: 138 MMHG | HEART RATE: 84 BPM | DIASTOLIC BLOOD PRESSURE: 75 MMHG | TEMPERATURE: 97.6 F

## 2019-03-05 DIAGNOSIS — C25.2 MALIGNANT NEOPLASM OF TAIL OF PANCREAS (HCC): Chronic | ICD-10-CM

## 2019-03-05 LAB
ALBUMIN SERPL-MCNC: 2.8 G/DL (ref 3.5–5)
ALBUMIN/GLOB SERPL: 0.7 {RATIO} (ref 1.2–3.5)
ALP SERPL-CCNC: 111 U/L (ref 50–136)
ALT SERPL-CCNC: 11 U/L (ref 12–65)
ANION GAP SERPL CALC-SCNC: 5 MMOL/L (ref 7–16)
AST SERPL-CCNC: 9 U/L (ref 15–37)
BASOPHILS # BLD: 0.1 K/UL (ref 0–0.2)
BASOPHILS NFR BLD: 0 % (ref 0–2)
BILIRUB SERPL-MCNC: 0.2 MG/DL (ref 0.2–1.1)
BUN SERPL-MCNC: 7 MG/DL (ref 6–23)
CALCIUM SERPL-MCNC: 9.5 MG/DL (ref 8.3–10.4)
CANCER AG19-9 SERPL-ACNC: 9778.5 U/ML (ref 2–37)
CHLORIDE SERPL-SCNC: 104 MMOL/L (ref 98–107)
CO2 SERPL-SCNC: 28 MMOL/L (ref 21–32)
CREAT SERPL-MCNC: 0.74 MG/DL (ref 0.6–1)
DIFFERENTIAL METHOD BLD: ABNORMAL
EOSINOPHIL # BLD: 0.1 K/UL (ref 0–0.8)
EOSINOPHIL NFR BLD: 1 % (ref 0.5–7.8)
ERYTHROCYTE [DISTWIDTH] IN BLOOD BY AUTOMATED COUNT: 18.2 % (ref 11.9–14.6)
GLOBULIN SER CALC-MCNC: 4.1 G/DL (ref 2.3–3.5)
GLUCOSE SERPL-MCNC: 134 MG/DL (ref 65–100)
HCT VFR BLD AUTO: 28.6 % (ref 35.8–46.3)
HGB BLD-MCNC: 8.6 G/DL (ref 11.7–15.4)
IMM GRANULOCYTES # BLD AUTO: 0.2 K/UL (ref 0–0.5)
IMM GRANULOCYTES NFR BLD AUTO: 1 % (ref 0–5)
LYMPHOCYTES # BLD: 1.5 K/UL (ref 0.5–4.6)
LYMPHOCYTES NFR BLD: 7 % (ref 13–44)
MCH RBC QN AUTO: 25 PG (ref 26.1–32.9)
MCHC RBC AUTO-ENTMCNC: 30.1 G/DL (ref 31.4–35)
MCV RBC AUTO: 83.1 FL (ref 79.6–97.8)
MONOCYTES # BLD: 0.6 K/UL (ref 0.1–1.3)
MONOCYTES NFR BLD: 3 % (ref 4–12)
NEUTS SEG # BLD: 19.3 K/UL (ref 1.7–8.2)
NEUTS SEG NFR BLD: 88 % (ref 43–78)
NRBC # BLD: 0 K/UL (ref 0–0.2)
PLATELET # BLD AUTO: 748 K/UL (ref 150–450)
PMV BLD AUTO: 9 FL (ref 9.4–12.3)
POTASSIUM SERPL-SCNC: 3.8 MMOL/L (ref 3.5–5.1)
PROT SERPL-MCNC: 6.9 G/DL (ref 6.3–8.2)
RBC # BLD AUTO: 3.44 M/UL (ref 4.05–5.25)
SODIUM SERPL-SCNC: 137 MMOL/L (ref 136–145)
WBC # BLD AUTO: 21.9 K/UL (ref 4.3–11.1)

## 2019-03-05 PROCEDURE — 86301 IMMUNOASSAY TUMOR CA 19-9: CPT

## 2019-03-05 PROCEDURE — 85025 COMPLETE CBC W/AUTO DIFF WBC: CPT

## 2019-03-05 PROCEDURE — 99211 OFF/OP EST MAY X REQ PHY/QHP: CPT

## 2019-03-05 PROCEDURE — 80053 COMPREHEN METABOLIC PANEL: CPT

## 2019-03-05 PROCEDURE — 77470 SPECIAL RADIATION TREATMENT: CPT

## 2019-03-05 NOTE — NURSE NAVIGATOR
F/u pancreatic cancer. Pet scan 2-18-19. C/o low back pain at night. Takes Roxicodone and uses Duragesic patch. Chemo 12/18. Stopped treatment due to side effects. F/u Dr. Guicho Heath today. CONSENTS SIGNED FOR RT TO THE ABDOMEN. [facility] SCHEDULED Friday, 3-7-19 AT 9 AM.  APT GIVEN TO PT. PLAN: CONCURRENT XELODA AND RT.       Cheyenne Rodriguez RN

## 2019-03-05 NOTE — PROGRESS NOTES
Patient: Diogo Rowland MRN: [id number]  SSN: [id number]    YOB: 1960  Age: 64 y.o. Sex: female      Other Providers:          Elvi Wilkins MD     CHIEF COMPLAINT: Pancreatic cancer     DIAGNOSIS: Adenocarcinoma of the tail of pancreas, T4 N0, unresectable     HISTORY OF PRESENT ILLNESS:  Diogo Rowland is a 64 y.o. female who I am seeing at the request of Dr. Seferino Villalta. She has a medical history significant for hypertension, hypercholesterolemia and a remote history of JULIO C/BSO. She presented to her PCP in 10/18 with a one-month history of progressive abdominal symptoms including intermittent nausea and vomiting, as well as episodic cramping. CT scan of the abdomen and pelvis on 11/08/18 showed a 30 x 33 x 39 mm hypodense mass within the tail and body of the pancreas. Infiltration of the peripancreatic tissues with peripancreatic lymphadenopathy was also identified. This process was seen to involve an adjacent small bowel loop. CT scan of the chest on 11/09/18 for completion of staging showed a low-density mass in the distal body of the pancreas compatible with the known pancreatic cancer. Old granulomatous disease with a calcified lymph node was seen in the left hilum and a tiny calcified granuloma was seen in the right lower lung, as well as a calcified granuloma in the spleen. On 11/12/18 she was taken to the operating room by Dr. Xavier Brian for exploratory laparotomy, partial gastrectomy, pancreatic core biopsy, gastrojejunostomy and splenectomy. Laparoscopy showed no peritoneal disease. However, a large pancreatic mass was identified in the body and tail of the pancreas with invasion of the stomach wall and invasion through the ligament of Treitz into the duodenum around the root of the mesentery. Secondary to the extent of disease it was deemed to be unresectable.     She was then seen by Dr. Seferino Villalta in medical oncology and initiated on FOLFIRINOX in 12/18.  Following her first cycle, she had progressive nausea and ultimately developed acute right-sided flank pain unresponsive to intravenous pain medication. She was admitted. CT scan of the abdomen and pelvis on 01/03/19 showed no change in the mass in the tail of pancreas. This measured 3.6 x 2.5 cm. No adenopathy was seen. However, it did show multiple right renal infarcts. She was placed on Xarelto with resolution of flank pain. She then decided to discontinue further chemotherapy due to severe side effects.      She was willing to consider the possibility of radiation therapy to the pancreas mass as a means of minimizing discomfort and perhaps slowing the progression of disease. I saw her on 02/08/19 for discussion of the potential role of radiation therapy and further management of her unresectable pancreatic cancer. INTERVAL HISTORY: Ms. Kirsten Ascencio returns for further discussion of her inoperable pancreatic cancer. PET/CT scan on 02/18/19 showed a solitary lesion in the tail of pancreas with elevated FDG activity consistent with adenocarcinoma. There was no additional regional or distant metastatic disease identified. I spoke with Dr. Karol Go who felt that if she had a good response to chemoradiotherapy that there was a possibility that the disease might be rendered operable.     At this time, Ms. Kirsten Ascencio is doing reasonably well. She is understandably emotional about the current situation. She is on fentanyl 12.5 µg patch for abdominal pain for which she had previously been taking oxycodone. She is very concerned with maintaining good quality of life. PAST MEDICAL HISTORY:    Past Medical History:   Diagnosis Date    Cancer [facility]) Dx 11/2018    pancreaitic cancer    Hypercholesterolemia     Hypertension     managed with meds    Pancreatic mass        The patient denies history of collagen vascular diseases, pacemaker insertion, prior radiation.     PAST SURGICAL HISTORY:   Past Surgical History:   Procedure Laterality Date    ABDOMEN SURGERY PROC UNLISTED      Exp Lap, resection pancreatitc mass    HX CHOLECYSTECTOMY      HX GYN      JULIO C BSO    HX HEENT      jaw surgery       MEDICATIONS:     Current Outpatient Medications:     oxyCODONE IR (ROXICODONE) 10 mg tab immediate release tablet, Take 1 Tab by mouth every six (6) hours as needed for Pain for up to 30 days. Max Daily Amount: 40 mg., Disp: 90 Tab, Rfl: 0    fentaNYL (DURAGESIC) 12 mcg/hr patch, 1 Patch by TransDERmal route every seventy-two (72) hours for 30 days. Max Daily Amount: 1 Patch., Disp: 10 Patch, Rfl: 0    LORazepam (ATIVAN) 2 mg tablet, Take 1 Tab by mouth every six (6) hours as needed for Anxiety. Max Daily Amount: 8 mg., Disp: 60 Tab, Rfl: 1    capecitabine (XELODA) 500 mg tablet, Take 2 ( 500mg) tabs twice a day until radiation complete including weekends, Disp: 148 Tab, Rfl: 1    fish oil-omega-3 fatty acids 300-500 mg cap, Take 1 Tab by mouth daily. , Disp: , Rfl:     nystatin (MYCOSTATIN) 100,000 unit/mL suspension, Take  by mouth four (4) times daily. swish and spit, Disp: , Rfl:     rivaroxaban (XARELTO) 20 mg tab tablet, Take 1 Tab by mouth daily. , Disp: 30 Tab, Rfl: 5    pantoprazole (PROTONIX) 40 mg tablet, Take 1 Tab by mouth Before breakfast and dinner., Disp: 30 Tab, Rfl: 2    ondansetron hcl (ZOFRAN) 8 mg tablet, TAKE 1 TABLET BY MOUTH EVERY 8 HOURS AS NEEDED FOR NAUSEA, Disp: , Rfl: 2    lipase-protease-amylase (CREON) 36,000-114,000- 180,000 unit cpDR capsule, Take 2 capsules with each meal and 1 with snacks. , Disp: 126 Cap, Rfl: 0    lidocaine-prilocaine (EMLA) topical cream, Apply  to affected area as needed for Pain. Apply to port site 45-60 minutes prior to lab appointment or infusion appointment. , Disp: 30 g, Rfl: 2    promethazine (PHENERGAN) 25 mg tablet, Take 1 Tab by mouth every six (6) hours as needed for Nausea (and vomiting). , Disp: 90 Tab, Rfl: 2  No current facility-administered medications for this encounter.      ALLERGIES: Allergies   Allergen Reactions    Penicillins Unknown (comments)     Childhood allergy. SOCIAL HISTORY:   Social History     Socioeconomic History    Marital status:      Spouse name: Not on file    Number of children: Not on file    Years of education: Not on file    Highest education level: Not on file   Social Needs    Financial resource strain: Not on file    Food insecurity - worry: Not on file    Food insecurity - inability: Not on file   [place] needs - medical: Not on file   [place] needs - non-medical: Not on file   Occupational History    Not on file   Tobacco Use    Smoking status: Former Smoker     Packs/day: 1.00     Years: 20.00     Pack years: 20.00     Last attempt to quit: 2018     Years since quittin.3    Smokeless tobacco: Never Used   Substance and Sexual Activity    Alcohol use: No     Alcohol/week: 0.0 oz     Frequency: Never    Drug use: No    Sexual activity: Not on file   Other Topics Concern    Not on file   Social History Narrative    Not on file       FAMILY HISTORY:   Family History   Problem Relation Age of Onset    Elevated Lipids Mother     Hypertension Mother     Heart Disease Mother     Elevated Lipids Father     Hypertension Father     Heart Disease Father        REVIEW OF SYSTEMS: Please see the completed review of systems sheet in the chart that I have reviewed today. PHYSICAL EXAMINATION:   ECOG Performance status 1  VITAL SIGNS:   Visit Vitals  /75 (BP 1 Location: Left arm, BP Patient Position: Sitting)   Pulse 84   Temp 97.6 °F (36.4 °C)   SpO2 99%        GENERAL: The patient is well-developed, ambulatory, alert and in no acute distress. HEENT: Head is normocephalic, atraumatic. Pupils are equal, round and reactive to light and accommodation. Extraocular movement intact. NECK: Neck is supple with no masses. CARDIOVASCULAR: Heart has regular rate and rhythm. There are no murmurs, rubs or gallops. Radial pulses are 2+ RESPIRATORY: Lungs are clear to auscultation and percussion. There is normal respiratory effort. GASTROINTESTINAL: The abdomen is soft, non-tender, nondistended with no hepatospelnomagaly. Digital rectal examination: deferred. MUSCULOSKELETAL: Extremities reveal no cyanosis, clubbing or edema.  is 5+/5. NEURO:  Cranial nerves II-XII grossly intact. Muscular strength and sensation are intact throughout all four extremities.            PATHOLOGY:       11/12/18:    DIAGNOSIS   A: STOMACH: GASTRIC TISSUE WITH NO DIAGNOSTIC HISTOPATHOLOGIC ABNORMALITIES. B: SPLEEN: SPLENIC TISSUE WITH NO DIAGNOSTIC HISTOPATHOLOGIC ABNORMALITIES. C: SUPRAPANCREATIC LYMPH NODE: LYMPH NODE TISSUE, NO CARCINOMA IDENTIFIED.    D: PANCREATIC TAIL MASS: PANCREATIC TISSUE WITH ATYPICAL GLANDS CONSISTENT WITH FEATURES OF ADENOCARCINOMA.            LABORATORY:   Lab Results   Component Value Date/Time    Sodium 137 03/05/2019 11:56 AM    Potassium 3.8 03/05/2019 11:56 AM    Chloride 104 03/05/2019 11:56 AM    CO2 28 03/05/2019 11:56 AM    Anion gap 5 (L) 03/05/2019 11:56 AM    Glucose 134 (H) 03/05/2019 11:56 AM    BUN 7 03/05/2019 11:56 AM    Creatinine 0.74 03/05/2019 11:56 AM    GFR est AA >60 03/05/2019 11:56 AM    GFR est non-AA >60 03/05/2019 11:56 AM    Calcium 9.5 03/05/2019 11:56 AM    Magnesium 2.7 (H) 01/25/2019 11:09 AM    Phosphorus 4.2 11/27/2018 03:03 PM    Albumin 2.8 (L) 03/05/2019 11:56 AM    Protein, total 6.9 03/05/2019 11:56 AM    Globulin 4.1 (H) 03/05/2019 11:56 AM    A-G Ratio 0.7 (L) 03/05/2019 11:56 AM    AST (SGOT) 9 (L) 03/05/2019 11:56 AM    ALT (SGPT) 11 (L) 03/05/2019 11:56 AM     Lab Results   Component Value Date/Time    WBC 21.9 (H) 03/05/2019 11:56 AM    HGB 8.6 (L) 03/05/2019 11:56 AM    HCT 28.6 (L) 03/05/2019 11:56 AM    PLATELET 955 (H) 58/34/1188 11:56 AM       RADIOLOGY:      2/19/2019: Pet/ct Tumor Image Skull Thigh W (ini)  PET/CT  Indication: Malignant neoplasm tail of pancreas Radiopharmaceutical: 14.55 mCi F18-FDG, intravenously. Technique: Imaging was performed from the skull through the proximal thighs using routine PET/CT acquisition protocol. Imaging was performed approximately 60 minutes post injection. Oral contrast was administered. Radiation dose reduction techniques were used for this study:  Our CT scanners use one or all of the following: Automated exposure control, adjustment of the mA and/or kVp according to patient's size, iterative reconstruction. Serum glucose: 114 mg/dL prior to injection. Comparison studies: CT abdomen and pelvis 1/3/2019 Findings: Head and Neck: No lymphadenopathy or abnormal FDG uptake. Chest: No lymphadenopathy or abnormal FDG uptake. Right IJ portacatheter tip distal SVC. Scattered coronary artery calcifications. Mild cardiomegaly. Calcified mediastinal and left hilar adenopathy consistent with prior granulomatous infection. No discrete focal pulmonary lesion on nonbreath-hold technique. Abdomen/Pelvis: The previously described patella pancreas lesion has intense FDG activity, max SUV 4.9 image 124. No adjacent discrete FDG avid lymph node. No discrete FDG avid liver lesion. No bowel obstruction. Atherosclerotic disease. No focal osseous uptake. IMPRESSION: 1. Solitary lesion tail of pancreas with elevated FDG activity consistent with adenocarcinoma. 2. No additional regional or distant metastatic foci.       01/03/19: CT abdomen and pelvis dated 1/3/2019     CLINICAL INFORMATION: Right lower quadrant pain, nausea and vomiting     Comparison made with outside CT 11/8/2018     Spiral images of the abdomen and pelvis were obtained during intravenous  injection of 100 cc nonionic contrast.     This study requested and performed no oral contrast. This limits evaluation of  bowel.     All CT scanners at this facility use one or all of the following:  automated  exposure control, adjustment of the mA and or kVp according to patient size,  iterative reconstruction     CT ABDOMEN:  Upper images show no infiltrate in either lung base.     The spleen is small measuring 2.6 x 1.7 cm. There is diffuse fatty infiltration  of the liver. Mass is noted at the tail of the pancreas. This measures 3.6 x 2.5  cm. Overall no definite change. There are postoperative changes at the stomach. Adrenals are unremarkable. The kidneys are normal in size. Left kidney is  unremarkable. There are sharply circumscribed areas of decreased contrast  enhancement in the mid and lower pole the right kidney. Pyelonephritis or  ischemic change could give this appearance. Abdominal aorta is normal in size. No adenopathy.     CT PELVIS:  No mass, adenopathy or abnormal fluid collection. There is mild colonic  diverticulosis.     IMPRESSION  IMPRESSION:  1. No change in a mass in tail of pancreas. 2. Multiple sharply circumscribed areas of decreased enhancement in the mid and  lower right kidney. Pyelonephritis or ischemic change could give this  appearance. 3. Diverticulosis        11/09/18: HISTORY: Staging for pancreatic cancer.     CT chest with contrast dated 11/9/2018: There are no prior studies for  comparison.     Spiral images were obtained through the chest during bolus IV contrast  administration. 80 mL of Isovue 370 were administered intravenously without  adverse patient reaction. Contrast was given for optimal opacification of  vascular structures and solid organs. Radiation dose reduction techniques were  applied by the CT scanner.     FINDINGS: No significant adenopathy is seen in the axilla, mediastinum, or  hilar. There is a calcified lymph node in the left hilum best seen on image 28. There is a tiny calcified granuloma in the right lower lung. No noncalcified  pulmonary nodules are seen. There is no focal infiltrate or effusion.     In the upper abdomen, the liver shows mild fatty infiltration, but no focal  hepatic abnormality is seen.  The gallbladder is not visualized. There is a  calcified granuloma in the spleen. There is a mass in the distal body of the  pancreas best seen on images 56 through 59. This measures 3.4 x 3.5 cm in size  on image 57. This is compatible with the history of pancreatic cancer. Pancreatic head is unremarkable. No significant adenopathy is seen in the  peripancreatic region. There are no abnormal fluid collections. No significant  adrenal abnormalities are seen. The visualized portions of the kidneys are  unremarkable. The abdominal aorta is nonaneurysmal.     Bone window images demonstrate degenerative changes in the thoracic spine. No  significant bony abnormality is noted otherwise.     IMPRESSION  IMPRESSION:  1. Low-density mass in the distal body of the pancreas compatible with the known  pancreatic cancer. 2. Old granulomatous disease with a calcified lymph node in the left hilum and a  tiny calcified granuloma in the right lower lung and a calcified granuloma in  the spleen. 3. No significant abnormality seen otherwise in the chest or upper abdomen. No  pulmonary masses suggestive of metastatic disease. No significant adenopathy in  the upper abdomen.        IMPRESSION:  Prateek Dietz is a 64 y.o. female with Adenocarcinoma of the tail of pancreas, T4 N0, unresectable.                 IMPRESSION:  Prateek Dietz is a 64 y.o. female with Adenocarcinoma of the tail of pancreas, T4 N0, unresectable.       COUNSELING AND COORDINATION OF CARE: I have had a 45 minute follow-up with Ms. Morro Blevins and her  regarding management options for her unresectable pancreatic cancer. We again discussed the natural history of pancreatic cancer.     We discussed that neoadjuvant chemoradiotherapy is commonly employed for improvement in locoregional control and making borderline tumors resectable.   Based on the collective data from [facility] institutional trials, about 1/3 of patients are ultimately able to go to their curative treatment, meaning whipple and that is the goal of therapy. PET/CT scan on 02/18/19 showed a solitary lesion in the tail of pancreas with elevated FDG activity consistent with adenocarcinoma. There was no additional regional or distant metastatic disease identified. I spoke with Dr. Eulalia Mejía who felt that if she had a good response to chemoradiotherapy that there was a possibility that the disease might be rendered operable. I also spoke with Dr. Guicho Heath who agreed to have the patient try a course of radiation with concurrent Xeloda.      We will plan to treat to a dose of 45 Gy in 25 fractions to an area including the gross disease and coverage of sil areas followed by a boost of 5.4 Gy in 3 fractions to the gross disease.      Following treatment, she will undergo imaging to determine whether she has become a surgical candidate.            Thank you for allowing me to participate in this very pleasant patient's care. Justin Lau MD   March 5, 2019      Portions of this note were copied from prior encounters and reviewed for accuracy, currency, and represent documentation and tasks completed during this encounter. I verify and attest these portions to be unchanged from prior visits.     Justin Lau MD  03/05/19

## 2019-03-08 ENCOUNTER — HOSPITAL ENCOUNTER (OUTPATIENT)
Dept: RADIATION ONCOLOGY | Age: 59
Discharge: HOME | End: 2019-03-08
Payer: COMMERCIAL

## 2019-03-10 ENCOUNTER — APPOINTMENT (OUTPATIENT)
Dept: CT IMAGING | Age: 59
DRG: 065 | End: 2019-03-10
Attending: EMERGENCY MEDICINE
Payer: COMMERCIAL

## 2019-03-10 ENCOUNTER — HOSPITAL ENCOUNTER (INPATIENT)
Age: 59
LOS: 1 days | Discharge: HOME HEALTH | DRG: 065 | End: 2019-03-12
Attending: EMERGENCY MEDICINE | Admitting: INTERNAL MEDICINE
Payer: COMMERCIAL

## 2019-03-10 DIAGNOSIS — C25.2 MALIGNANT NEOPLASM OF TAIL OF PANCREAS (HCC): ICD-10-CM

## 2019-03-10 DIAGNOSIS — I63.232 ACUTE ISCHEMIC LEFT INTERNAL CAROTID ARTERY (ICA) STROKE (HCC): ICD-10-CM

## 2019-03-10 DIAGNOSIS — R53.1 RIGHT SIDED WEAKNESS: Primary | ICD-10-CM

## 2019-03-10 PROBLEM — R29.898 WEAKNESS OF RIGHT ARM: Status: ACTIVE | Noted: 2019-03-10

## 2019-03-10 LAB
ALBUMIN SERPL-MCNC: 3.1 G/DL (ref 3.5–5)
ALBUMIN/GLOB SERPL: 0.7 {RATIO} (ref 1.2–3.5)
ALP SERPL-CCNC: 121 U/L (ref 50–136)
ALT SERPL-CCNC: 11 U/L (ref 12–65)
ANION GAP SERPL CALC-SCNC: 9 MMOL/L (ref 7–16)
AST SERPL-CCNC: 11 U/L (ref 15–37)
BASOPHILS # BLD: 0.1 K/UL (ref 0–0.2)
BASOPHILS NFR BLD: 1 % (ref 0–2)
BILIRUB SERPL-MCNC: 0.2 MG/DL (ref 0.2–1.1)
BUN SERPL-MCNC: 9 MG/DL (ref 6–23)
CALCIUM SERPL-MCNC: 9.4 MG/DL (ref 8.3–10.4)
CHLORIDE SERPL-SCNC: 101 MMOL/L (ref 98–107)
CO2 SERPL-SCNC: 27 MMOL/L (ref 21–32)
CREAT SERPL-MCNC: 0.87 MG/DL (ref 0.6–1)
DIFFERENTIAL METHOD BLD: ABNORMAL
EOSINOPHIL # BLD: 0 K/UL (ref 0–0.8)
EOSINOPHIL NFR BLD: 0 % (ref 0.5–7.8)
ERYTHROCYTE [DISTWIDTH] IN BLOOD BY AUTOMATED COUNT: 17.8 % (ref 11.9–14.6)
FOLATE SERPL-MCNC: 14.8 NG/ML (ref 3.1–17.5)
GLOBULIN SER CALC-MCNC: 4.6 G/DL (ref 2.3–3.5)
GLUCOSE SERPL-MCNC: 147 MG/DL (ref 65–100)
HCT VFR BLD AUTO: 33.1 % (ref 35.8–46.3)
HGB BLD-MCNC: 9.8 G/DL (ref 11.7–15.4)
IMM GRANULOCYTES # BLD AUTO: 0.2 K/UL (ref 0–0.5)
IMM GRANULOCYTES NFR BLD AUTO: 1 % (ref 0–5)
LYMPHOCYTES # BLD: 1.9 K/UL (ref 0.5–4.6)
LYMPHOCYTES NFR BLD: 8 % (ref 13–44)
MCH RBC QN AUTO: 24.6 PG (ref 26.1–32.9)
MCHC RBC AUTO-ENTMCNC: 29.6 G/DL (ref 31.4–35)
MCV RBC AUTO: 83 FL (ref 79.6–97.8)
MONOCYTES # BLD: 0.5 K/UL (ref 0.1–1.3)
MONOCYTES NFR BLD: 2 % (ref 4–12)
NEUTS SEG # BLD: 21.5 K/UL (ref 1.7–8.2)
NEUTS SEG NFR BLD: 89 % (ref 43–78)
NRBC # BLD: 0 K/UL (ref 0–0.2)
PLATELET # BLD AUTO: 916 K/UL (ref 150–450)
PMV BLD AUTO: 9.1 FL (ref 9.4–12.3)
POTASSIUM SERPL-SCNC: 3.5 MMOL/L (ref 3.5–5.1)
PROT SERPL-MCNC: 7.7 G/DL (ref 6.3–8.2)
RBC # BLD AUTO: 3.99 M/UL (ref 4.05–5.2)
SODIUM SERPL-SCNC: 137 MMOL/L (ref 136–145)
TROPONIN I SERPL-MCNC: <0.02 NG/ML (ref 0.02–0.05)
TSH SERPL DL<=0.005 MIU/L-ACNC: 1.2 UIU/ML (ref 0.36–3.74)
VIT B12 SERPL-MCNC: 458 PG/ML (ref 193–986)
WBC # BLD AUTO: 24.2 K/UL (ref 4.3–11.1)

## 2019-03-10 PROCEDURE — 74011250637 HC RX REV CODE- 250/637: Performed by: PHYSICIAN ASSISTANT

## 2019-03-10 PROCEDURE — 77030003560 HC NDL HUBR BARD -A

## 2019-03-10 PROCEDURE — 84443 ASSAY THYROID STIM HORMONE: CPT

## 2019-03-10 PROCEDURE — 82746 ASSAY OF FOLIC ACID SERUM: CPT

## 2019-03-10 PROCEDURE — 84484 ASSAY OF TROPONIN QUANT: CPT

## 2019-03-10 PROCEDURE — 74011250636 HC RX REV CODE- 250/636: Performed by: EMERGENCY MEDICINE

## 2019-03-10 PROCEDURE — 70450 CT HEAD/BRAIN W/O DYE: CPT

## 2019-03-10 PROCEDURE — 93005 ELECTROCARDIOGRAM TRACING: CPT | Performed by: EMERGENCY MEDICINE

## 2019-03-10 PROCEDURE — 85025 COMPLETE CBC W/AUTO DIFF WBC: CPT

## 2019-03-10 PROCEDURE — 80053 COMPREHEN METABOLIC PANEL: CPT

## 2019-03-10 PROCEDURE — 81003 URINALYSIS AUTO W/O SCOPE: CPT | Performed by: EMERGENCY MEDICINE

## 2019-03-10 PROCEDURE — 82607 VITAMIN B-12: CPT

## 2019-03-10 PROCEDURE — 99218 HC RM OBSERVATION: CPT

## 2019-03-10 PROCEDURE — 96374 THER/PROPH/DIAG INJ IV PUSH: CPT | Performed by: EMERGENCY MEDICINE

## 2019-03-10 PROCEDURE — 99285 EMERGENCY DEPT VISIT HI MDM: CPT | Performed by: EMERGENCY MEDICINE

## 2019-03-10 RX ORDER — OXYCODONE HYDROCHLORIDE 5 MG/1
10 TABLET ORAL
Status: DISCONTINUED | OUTPATIENT
Start: 2019-03-10 | End: 2019-03-12

## 2019-03-10 RX ORDER — ACETAMINOPHEN 325 MG/1
650 TABLET ORAL
Status: DISCONTINUED | OUTPATIENT
Start: 2019-03-10 | End: 2019-03-12 | Stop reason: HOSPADM

## 2019-03-10 RX ORDER — LORAZEPAM 1 MG/1
2 TABLET ORAL
Status: DISCONTINUED | OUTPATIENT
Start: 2019-03-10 | End: 2019-03-12 | Stop reason: HOSPADM

## 2019-03-10 RX ORDER — GUAIFENESIN 100 MG/5ML
81 LIQUID (ML) ORAL DAILY
Status: DISCONTINUED | OUTPATIENT
Start: 2019-03-11 | End: 2019-03-12 | Stop reason: HOSPADM

## 2019-03-10 RX ORDER — FENTANYL 12.5 UG/1
1 PATCH TRANSDERMAL
Status: DISCONTINUED | OUTPATIENT
Start: 2019-03-10 | End: 2019-03-12 | Stop reason: HOSPADM

## 2019-03-10 RX ORDER — PRAVASTATIN SODIUM 20 MG/1
40 TABLET ORAL
Status: DISCONTINUED | OUTPATIENT
Start: 2019-03-10 | End: 2019-03-12 | Stop reason: HOSPADM

## 2019-03-10 RX ORDER — ADHESIVE BANDAGE
30 BANDAGE TOPICAL
Status: DISCONTINUED | OUTPATIENT
Start: 2019-03-10 | End: 2019-03-12 | Stop reason: HOSPADM

## 2019-03-10 RX ORDER — PANTOPRAZOLE SODIUM 40 MG/1
40 TABLET, DELAYED RELEASE ORAL
Status: DISCONTINUED | OUTPATIENT
Start: 2019-03-11 | End: 2019-03-12 | Stop reason: HOSPADM

## 2019-03-10 RX ORDER — DEXAMETHASONE SODIUM PHOSPHATE 100 MG/10ML
4 INJECTION INTRAMUSCULAR; INTRAVENOUS
Status: COMPLETED | OUTPATIENT
Start: 2019-03-10 | End: 2019-03-10

## 2019-03-10 RX ORDER — ONDANSETRON 2 MG/ML
4 INJECTION INTRAMUSCULAR; INTRAVENOUS
Status: DISCONTINUED | OUTPATIENT
Start: 2019-03-10 | End: 2019-03-12 | Stop reason: HOSPADM

## 2019-03-10 RX ORDER — ADHESIVE BANDAGE
45 BANDAGE TOPICAL
COMMUNITY

## 2019-03-10 RX ORDER — SODIUM CHLORIDE 0.9 % (FLUSH) 0.9 %
5-40 SYRINGE (ML) INJECTION AS NEEDED
Status: DISCONTINUED | OUTPATIENT
Start: 2019-03-10 | End: 2019-03-12 | Stop reason: HOSPADM

## 2019-03-10 RX ORDER — SODIUM CHLORIDE 0.9 % (FLUSH) 0.9 %
5-40 SYRINGE (ML) INJECTION EVERY 8 HOURS
Status: DISCONTINUED | OUTPATIENT
Start: 2019-03-10 | End: 2019-03-12 | Stop reason: HOSPADM

## 2019-03-10 RX ADMIN — Medication 10 ML: at 20:56

## 2019-03-10 RX ADMIN — OXYCODONE HYDROCHLORIDE 10 MG: 5 TABLET ORAL at 20:51

## 2019-03-10 RX ADMIN — MAGNESIUM HYDROXIDE 30 ML: 400 SUSPENSION ORAL at 20:52

## 2019-03-10 RX ADMIN — LORAZEPAM 2 MG: 1 TABLET ORAL at 20:55

## 2019-03-10 RX ADMIN — DEXAMETHASONE SODIUM PHOSPHATE 4 MG: 10 INJECTION INTRAMUSCULAR; INTRAVENOUS at 17:00

## 2019-03-10 NOTE — ED PROVIDER NOTES
Patient reports last Tuesday she developed weakening of her right hand and difficulty with dexterity. She spoke with her oncologist about it and no intervention was done. She has pancreatic cancer and has failed 2 rounds of chemo and is supposed to start radiation soon. Her right sided weakness has progressed into her arm and now her leg. No facial or verbal involvement. She denies pain. She was seen in triage but not Called a code stroke as symptoms have been ongoing and progressive for 5 days. She denies headache or nausea or vomiting. The history is provided by the patient. Extremity Weakness    This is a new problem. The current episode started more than 2 days ago. The problem occurs constantly. The problem has been gradually worsening. The pain is present in the right lower leg, right upper leg, right fingers, right hand and right arm. The patient is experiencing no pain. Associated symptoms include limited range of motion. There has been no history of extremity trauma.         Past Medical History:   Diagnosis Date    Cancer [facility]) Dx 11/2018    pancreaitic cancer    Hypercholesterolemia     Hypertension     managed with meds    Pancreatic mass        Past Surgical History:   Procedure Laterality Date    ABDOMEN SURGERY PROC UNLISTED      Exp Lap, resection pancreatitc mass    HX CHOLECYSTECTOMY      HX GYN      JULIO C BSO    HX HEENT      jaw surgery         Family History:   Problem Relation Age of Onset    Elevated Lipids Mother     Hypertension Mother     Heart Disease Mother     Elevated Lipids Father     Hypertension Father     Heart Disease Father        Social History     Socioeconomic History    Marital status:      Spouse name: Not on file    Number of children: Not on file    Years of education: Not on file    Highest education level: Not on file   Social Needs    Financial resource strain: Not on file    Food insecurity - worry: Not on file    Food insecurity - inability: Not on file    Transportation needs - medical: Not on file   [employer] needs - non-medical: Not on file   Occupational History    Not on file   Tobacco Use    Smoking status: Former Smoker     Packs/day: 1.00     Years: 20.00     Pack years: 20.00     Last attempt to quit: 2018     Years since quittin.3    Smokeless tobacco: Never Used   Substance and Sexual Activity    Alcohol use: No     Alcohol/week: 0.0 oz     Frequency: Never    Drug use: No    Sexual activity: Not on file   Other Topics Concern    Not on file   Social History Narrative    Not on file         ALLERGIES: Penicillins    Review of Systems   Constitutional: Negative for chills and fever. Neurological: Positive for weakness. All other systems reviewed and are negative. Vitals:    03/10/19 1522   BP: 148/63   Pulse: 96   Resp: 18   Temp: 97.8 °F (36.6 °C)   SpO2: 99%   Weight: 85.3 kg (188 lb)   Height: 5' 3\" (1.6 m)            Physical Exam   Constitutional: She is oriented to person, place, and time. She appears well-developed and well-nourished. No distress. HENT:   Head: Normocephalic and atraumatic. Eyes: Conjunctivae are normal. Right eye exhibits no discharge. Left eye exhibits no discharge. Neck: Normal range of motion. Neck supple. Cardiovascular: Normal rate and regular rhythm. Pulmonary/Chest: Effort normal and breath sounds normal. No respiratory distress. Abdominal: Soft. She exhibits no distension. There is no tenderness. Musculoskeletal: She exhibits no edema. Neurological: She is alert and oriented to person, place, and time. No cranial nerve deficit. Normal speech. Right upper and lower extremity weakness compared with left. Skin: Skin is warm and dry. Capillary refill takes less than 2 seconds. She is not diaphoretic. Psychiatric: She has a normal mood and affect. Her behavior is normal.   Nursing note and vitals reviewed.        MDM  Number of Diagnoses or Management Options  Malignant neoplasm of tail of pancreas [facility]):   Right sided weakness:   Diagnosis management comments: Patient CT scan with nonspecific findings. She had a PET scan in February that did not show any metastatic disease. Her symptoms are certainly concerning for metastatic disease though or Less likely be related to her previous chemotherapy vs stroke. I discussed these 3 options with her and the fact that she needs an MRI. Patient became tearful and stated that if she had terminal cancer she wanted to go home and live The rest of her life. She is agreeable to staying for MRI And figure out a plan. She understands that the care will be very different depending on what they find. Will need palliative care consult. Pt declined to have . She has family at bedside.        Amount and/or Complexity of Data Reviewed  Clinical lab tests: ordered and reviewed  Review and summarize past medical records: yes  Discuss the patient with other providers: yes (Discussed with the hospitalist)  Independent visualization of images, tracings, or specimens: yes (Normal sinus rhythm no ST elevation right bundle branch block)    Risk of Complications, Morbidity, and/or Mortality  Presenting problems: high  Diagnostic procedures: moderate  Management options: high    Patient Progress  Patient progress: stable         Procedures

## 2019-03-10 NOTE — H&P
HOSPITALIST H&P  NAME:  Prateek Dietz   Age:  64 y.o.  :   1960   MRN:   [id number]  PCP: Taryn Khalil MD  Consulting MD:  Treatment Team: Attending Provider: Elsa Hawk MD; Primary Nurse: Celestino Cho, RN; Primary Nurse: Geovanna Mckoy RN    HPI:     Patient is a 65 yo CF with a history of HTN, HLD, pancreatic cancer who presented to the ED with c/o right sided weakness. Patient states she noticed decreased dexterity in her right hand last week. Deemed as neuropathy by oncology. Since then has developed into right hand, arm and right leg weakness, ataxia, decreased coordination in right arm. Denies facial asymmetry, aphasia, limited ROM, syncope, dizziness, chest pain, abd pain, n/v/d, seizures, loss of bowel or bladder. ED provider has asked our hospitalist service to evaluate and further manage the patient. Patient was diagnosed with pancreatic cancer in 2018. She underwent s/p spleenectomy, gastro jejunostomy and biopsies. FOLFIRINOX Dec 2018 halted with admission for acute right-sided flank pain found to be multiple right renal infarcts. Anticoagulated on xarelto. She had made a decision that the toxicity of her FOLFIRINOX was more than she wanted to endure and ultimately chose to discontinue her chemotherapy. PET scan in 2019 showed her tumor localized to her pancreas. Plan was to start radiation therapy concurrent with radiation sensitizing chemo treatment.      Complete ROS done and is as stated in HPI or otherwise negative  Past Medical History:   Diagnosis Date    Cancer [facility]) Dx 2018    pancreaitic cancer    Hypercholesterolemia     Hypertension     managed with meds    Pancreatic mass       Past Surgical History:   Procedure Laterality Date    ABDOMEN SURGERY PROC UNLISTED      Exp Lap, resection pancreatitc mass    HX CHOLECYSTECTOMY      HX GYN      JULIO C BSO    HX HEENT      jaw surgery      Prior to Admission Medications   Prescriptions Last Dose Informant Patient Reported? Taking? LORazepam (ATIVAN) 2 mg tablet 3/9/2019 at Unknown time  No Yes   Sig: Take 1 Tab by mouth every six (6) hours as needed for Anxiety. Max Daily Amount: 8 mg.   capecitabine (XELODA) 500 mg tablet Not Taking at Unknown time  No No   Sig: Take 2 ( 500mg) tabs twice a day until radiation complete including weekends   fentaNYL (DURAGESIC) 12 mcg/hr patch 3/10/2019 at Unknown time  No Yes   Si Patch by TransDERmal route every seventy-two (72) hours for 30 days. Max Daily Amount: 1 Patch. fish oil-omega-3 fatty acids 300-500 mg cap 3/10/2019 at Unknown time  Yes Yes   Sig: Take 1 Tab by mouth daily. lidocaine-prilocaine (EMLA) topical cream 3/3/2019 at Unknown time  No Yes   Sig: Apply  to affected area as needed for Pain. Apply to port site 45-60 minutes prior to lab appointment or infusion appointment. lipase-protease-amylase (CREON) 36,000-114,000- 180,000 unit cpDR capsule Not Taking at Unknown time  No No   Sig: Take 2 capsules with each meal and 1 with snacks. magnesium hydroxide (BARBOZA MILK OF MAGNESIA) 400 mg/5 mL suspension 3/9/2019 at Unknown time  Yes Yes   Sig: Take 30 mL by mouth nightly. nystatin (MYCOSTATIN) 100,000 unit/mL suspension Not Taking at Unknown time  Yes No   Sig: Take  by mouth four (4) times daily. swish and spit   ondansetron hcl (ZOFRAN) 8 mg tablet 3/3/2019 at Unknown time  Yes Yes   Sig: TAKE 1 TABLET BY MOUTH EVERY 8 HOURS AS NEEDED FOR NAUSEA   oxyCODONE IR (ROXICODONE) 10 mg tab immediate release tablet 3/9/2019 at Unknown time  No Yes   Sig: Take 1 Tab by mouth every six (6) hours as needed for Pain for up to 30 days. Max Daily Amount: 40 mg.   pantoprazole (PROTONIX) 40 mg tablet 3/10/2019 at Unknown time  No Yes   Sig: Take 1 Tab by mouth Before breakfast and dinner. promethazine (PHENERGAN) 25 mg tablet 2/10/2019 at Unknown time  No Yes   Sig: Take 1 Tab by mouth every six (6) hours as needed for Nausea (and vomiting). rivaroxaban (XARELTO) 20 mg tab tablet 3/10/2019 at Unknown time  No Yes   Sig: Take 1 Tab by mouth daily. Facility-Administered Medications: None     Allergies   Allergen Reactions    Penicillins Unknown (comments)     Childhood allergy. Social History     Tobacco Use    Smoking status: Current Every Day Smoker     Packs/day: 0.50     Years: 20.00     Pack years: 10.00     Last attempt to quit: 2018     Years since quittin.3    Smokeless tobacco: Never Used   Substance Use Topics    Alcohol use: Yes     Alcohol/week: 0.0 oz     Frequency: Never     Comment: Socially      Family History   Problem Relation Age of Onset    Elevated Lipids Mother     Hypertension Mother     Heart Disease Mother     Stroke Mother     Elevated Lipids Father     Hypertension Father     Heart Disease Father       Objective:     Visit Vitals  /73   Pulse 85   Temp 97.8 °F (36.6 °C)   Resp 18   Ht 5' 3\" (1.6 m)   Wt 85.3 kg (188 lb)   SpO2 100%   BMI 33.30 kg/m²      Temp (24hrs), Av.8 °F (36.6 °C), Min:97.8 °F (36.6 °C), Max:97.8 °F (36.6 °C)    Oxygen Therapy  O2 Sat (%): 100 % (03/10/19 1700)  Pulse via Oximetry: 86 beats per minute (03/10/19 1700)  O2 Device: Room air (03/10/19 1746)  Physical Exam:  General: Alert and oriented, No acute distress. Obese. Eye: PERRLA, EOMI, Normal conjunctiva. HENT: Normocephalic, atraumatic, Normal hearing, moist mucous membranes. Neck: Supple, Non-tender. Respiratory: Lungs are clear to auscultation, Respirations are non-labored. Cardiovascular: Normal rate, Regular rhythm, No murmur. Gastrointestinal: Soft, Non-tender, nondistended, positive bowel sounds, healed midline surgical incision. Musculoskeletal: Normal range of motion,  No tenderness. No cyanosis clubbing or edema. Integumentary: Warm, Dry, Pink, no rash. Neurologic: Alert, Oriented, poor coordination with right UE only. R arm 2/5 strength, L arm 5/5, rle 4/5, lle 5/5.  Sensation intact. No facial asymmetry, no aphasia. No asterisks. Tongue protrudes midline. Cognition and Speech: Oriented, Speech clear and coherent. Psychiatric: Cooperative, Appropriate mood & affect. Data Review:   Recent Results (from the past 24 hour(s))   CBC WITH AUTOMATED DIFF    Collection Time: 03/10/19  3:30 PM   Result Value Ref Range    WBC 24.2 (H) 4.3 - 11.1 K/uL    RBC 3.99 (L) 4.05 - 5.2 M/uL    HGB 9.8 (L) 11.7 - 15.4 g/dL    HCT 33.1 (L) 35.8 - 46.3 %    MCV 83.0 79.6 - 97.8 FL    MCH 24.6 (L) 26.1 - 32.9 PG    MCHC 29.6 (L) 31.4 - 35.0 g/dL    RDW 17.8 (H) 11.9 - 14.6 %    PLATELET 505 (H) 227 - 450 K/uL    MPV 9.1 (L) 9.4 - 12.3 FL    ABSOLUTE NRBC 0.00 0.0 - 0.2 K/uL    DF AUTOMATED      NEUTROPHILS 89 (H) 43 - 78 %    LYMPHOCYTES 8 (L) 13 - 44 %    MONOCYTES 2 (L) 4.0 - 12.0 %    EOSINOPHILS 0 (L) 0.5 - 7.8 %    BASOPHILS 1 0.0 - 2.0 %    IMMATURE GRANULOCYTES 1 0.0 - 5.0 %    ABS. NEUTROPHILS 21.5 (H) 1.7 - 8.2 K/UL    ABS. LYMPHOCYTES 1.9 0.5 - 4.6 K/UL    ABS. MONOCYTES 0.5 0.1 - 1.3 K/UL    ABS. EOSINOPHILS 0.0 0.0 - 0.8 K/UL    ABS. BASOPHILS 0.1 0.0 - 0.2 K/UL    ABS. IMM. GRANS. 0.2 0.0 - 0.5 K/UL   METABOLIC PANEL, COMPREHENSIVE    Collection Time: 03/10/19  3:30 PM   Result Value Ref Range    Sodium 137 136 - 145 mmol/L    Potassium 3.5 3.5 - 5.1 mmol/L    Chloride 101 98 - 107 mmol/L    CO2 27 21 - 32 mmol/L    Anion gap 9 7 - 16 mmol/L    Glucose 147 (H) 65 - 100 mg/dL    BUN 9 6 - 23 MG/DL    Creatinine 0.87 0.6 - 1.0 MG/DL    GFR est AA >60 >60 ml/min/1.73m2    GFR est non-AA >60 >60 ml/min/1.73m2    Calcium 9.4 8.3 - 10.4 MG/DL    Bilirubin, total 0.2 0.2 - 1.1 MG/DL    ALT (SGPT) 11 (L) 12 - 65 U/L    AST (SGOT) 11 (L) 15 - 37 U/L    Alk.  phosphatase 121 50 - 136 U/L    Protein, total 7.7 6.3 - 8.2 g/dL    Albumin 3.1 (L) 3.5 - 5.0 g/dL    Globulin 4.6 (H) 2.3 - 3.5 g/dL    A-G Ratio 0.7 (L) 1.2 - 3.5     TROPONIN I    Collection Time: 03/10/19  3:30 PM   Result Value Ref Range Troponin-I, Qt. <0.02 (L) 0.02 - 0.05 NG/ML   EKG, 12 LEAD, INITIAL    Collection Time: 03/10/19  4:44 PM   Result Value Ref Range    Ventricular Rate 84 BPM    Atrial Rate 84 BPM    P-R Interval 132 ms    QRS Duration 116 ms    Q-T Interval 404 ms    QTC Calculation (Bezet) 477 ms    Calculated P Axis 49 degrees    Calculated R Axis -100 degrees    Calculated T Axis 31 degrees    Diagnosis       !! AGE AND GENDER SPECIFIC ECG ANALYSIS !! Normal sinus rhythm  Low voltage QRS  Right bundle branch block  Possible Anterolateral infarct , age undetermined  Abnormal ECG  When compared with ECG of 09-NOV-2018 15:15,  Borderline criteria for Anterior infarct are now Present  Borderline criteria for Anterolateral infarct are now Present  T wave inversion less evident in Anterior leads       Imaging /Procedures /Studies   CT HEAD: Low-attenuation foci, left more than right, nonspecific, likely chronic small vessel disease. Demyelination can have a similar appearance. Assessment and Plan:     Progressive R sided weakness, ataxia, poor coordination, decreased finger dexterity. - CT head: Low-attenuation foci, left more than right, nonspecific, likely chronic small vessel disease. Demyelination can have a similar appearance. .   - Cardiac enzymes negative. - Order b12 levels, folate, lipid panel, TSH.  - MRI Brain.  - ASA, statin.   - PT, ST.   - NPO until advanced by speech. - Neurochecks. Tele. - O2 to keep sats >92%    Hypertension  -  Continue home antihypertensives. -  Monitor and trend BP. HLD  - Continue Statin    Pancreatic Cancer  - s/p spleenectomy, gastro jejunostomy and biopsies  - FOLFIRINOX Dec 2018 halted with admission for acute right-sided flank pain found to be multiple right renal infarcts. Anticoagulated on xarelto. - plan for radiation therapy concurrent with radiation sensitizing chemo treatment. - aware    Will continue essential home medications.   Further management depends on patient progress. GI & DVT prophylaxis: ppi, scds, Xarelto  Code Status: Full Code  Discussed with Dr. Edin Lala  Anticipated discharge: to home when stable. Total Time spent: 55 minutes. Counseling & coordinating care dominated the encounter >55%. Counseled patient and family regarding dx, rx, tx. Reviewed prior records, labs, vitals, diagnostic tests, flow sheet, home medications.       Rubin Luna PA-C, MPAS

## 2019-03-10 NOTE — ED NOTES
Report given to [facility], RN. Care transferred at this time until patient leaves for 5th floor with transport.

## 2019-03-10 NOTE — ROUTINE PROCESS
TRANSFER - OUT REPORT:    Verbal report given to KEON Kuo on Gaston Alvares  being transferred to room 526 for routine progression of care       Report consisted of patients Situation, Background, Assessment and   Recommendations(SBAR). Information from the following report(s) ED Summary was reviewed with the receiving nurse. Lines:   Peripheral IV 03/10/19 Left Antecubital (Active)   Site Assessment Clean, dry, & intact 3/10/2019  6:51 PM   Phlebitis Assessment 0 3/10/2019  6:51 PM   Infiltration Assessment 0 3/10/2019  6:51 PM   Dressing Status Clean, dry, & intact 3/10/2019  6:51 PM   Dressing Type Tape;Transparent 3/10/2019  6:51 PM   Hub Color/Line Status Flushed 3/10/2019  6:51 PM        Opportunity for questions and clarification was provided.

## 2019-03-10 NOTE — ED NOTES
Patient to ED via POV. Patient reports recently with 2 chemo treatments, last January 5th. Patient reports approximately 1 week ago, patient began to feel like she was losing dexterity in her hands. Over the past few days, patient reports losing some dexterity in the legs. Patient reports having trouble with fine motor functions. At time of triage, patient fully alert/oriented and able to answer all questions.

## 2019-03-11 ENCOUNTER — APPOINTMENT (OUTPATIENT)
Dept: CT IMAGING | Age: 59
DRG: 065 | End: 2019-03-11
Attending: INTERNAL MEDICINE
Payer: COMMERCIAL

## 2019-03-11 ENCOUNTER — APPOINTMENT (OUTPATIENT)
Dept: MRI IMAGING | Age: 59
DRG: 065 | End: 2019-03-11
Attending: PHYSICIAN ASSISTANT
Payer: COMMERCIAL

## 2019-03-11 LAB
ATRIAL RATE: 84 BPM
CALCULATED P AXIS, ECG09: 49 DEGREES
CALCULATED R AXIS, ECG10: -100 DEGREES
CALCULATED T AXIS, ECG11: 31 DEGREES
CHOLEST SERPL-MCNC: 193 MG/DL
DIAGNOSIS, 93000: NORMAL
ERYTHROCYTE [DISTWIDTH] IN BLOOD BY AUTOMATED COUNT: 17.8 % (ref 11.9–14.6)
EST. AVERAGE GLUCOSE BLD GHB EST-MCNC: 120 MG/DL
HBA1C MFR BLD: 5.8 % (ref 4.8–6)
HCT VFR BLD AUTO: 28.1 % (ref 35.8–46.3)
HDLC SERPL-MCNC: 35 MG/DL (ref 40–60)
HDLC SERPL: 5.5 {RATIO}
HGB BLD-MCNC: 8.5 G/DL (ref 11.7–15.4)
LDLC SERPL CALC-MCNC: 133.6 MG/DL
LIPID PROFILE,FLP: ABNORMAL
MCH RBC QN AUTO: 25.1 PG (ref 26.1–32.9)
MCHC RBC AUTO-ENTMCNC: 30.2 G/DL (ref 31.4–35)
MCV RBC AUTO: 83.1 FL (ref 79.6–97.8)
NRBC # BLD: 0 K/UL (ref 0–0.2)
P-R INTERVAL, ECG05: 132 MS
PLATELET # BLD AUTO: 814 K/UL (ref 150–450)
PMV BLD AUTO: 9.2 FL (ref 9.4–12.3)
Q-T INTERVAL, ECG07: 404 MS
QRS DURATION, ECG06: 116 MS
QTC CALCULATION (BEZET), ECG08: 477 MS
RBC # BLD AUTO: 3.38 M/UL (ref 4.05–5.2)
TRIGL SERPL-MCNC: 122 MG/DL (ref 35–150)
VENTRICULAR RATE, ECG03: 84 BPM
VLDLC SERPL CALC-MCNC: 24.4 MG/DL (ref 6–23)
WBC # BLD AUTO: 18.2 K/UL (ref 4.3–11.1)

## 2019-03-11 PROCEDURE — 74011636320 HC RX REV CODE- 636/320: Performed by: INTERNAL MEDICINE

## 2019-03-11 PROCEDURE — 99205 OFFICE O/P NEW HI 60 MIN: CPT | Performed by: PSYCHIATRY & NEUROLOGY

## 2019-03-11 PROCEDURE — 80061 LIPID PANEL: CPT

## 2019-03-11 PROCEDURE — 74011000258 HC RX REV CODE- 258: Performed by: INTERNAL MEDICINE

## 2019-03-11 PROCEDURE — 83036 HEMOGLOBIN GLYCOSYLATED A1C: CPT

## 2019-03-11 PROCEDURE — 99218 HC RM OBSERVATION: CPT

## 2019-03-11 PROCEDURE — 70551 MRI BRAIN STEM W/O DYE: CPT

## 2019-03-11 PROCEDURE — 85027 COMPLETE CBC AUTOMATED: CPT

## 2019-03-11 PROCEDURE — 74011250637 HC RX REV CODE- 250/637: Performed by: PHYSICIAN ASSISTANT

## 2019-03-11 PROCEDURE — 92610 EVALUATE SWALLOWING FUNCTION: CPT

## 2019-03-11 PROCEDURE — 74011250637 HC RX REV CODE- 250/637: Performed by: INTERNAL MEDICINE

## 2019-03-11 PROCEDURE — 70496 CT ANGIOGRAPHY HEAD: CPT

## 2019-03-11 PROCEDURE — 97166 OT EVAL MOD COMPLEX 45 MIN: CPT

## 2019-03-11 PROCEDURE — 97535 SELF CARE MNGMENT TRAINING: CPT

## 2019-03-11 PROCEDURE — 36591 DRAW BLOOD OFF VENOUS DEVICE: CPT

## 2019-03-11 PROCEDURE — 97112 NEUROMUSCULAR REEDUCATION: CPT

## 2019-03-11 RX ORDER — POTASSIUM CHLORIDE 20 MEQ/1
40 TABLET, EXTENDED RELEASE ORAL
Status: COMPLETED | OUTPATIENT
Start: 2019-03-11 | End: 2019-03-11

## 2019-03-11 RX ORDER — SODIUM CHLORIDE 0.9 % (FLUSH) 0.9 %
10 SYRINGE (ML) INJECTION
Status: COMPLETED | OUTPATIENT
Start: 2019-03-11 | End: 2019-03-11

## 2019-03-11 RX ADMIN — PANTOPRAZOLE SODIUM 40 MG: 40 TABLET, DELAYED RELEASE ORAL at 06:31

## 2019-03-11 RX ADMIN — SODIUM CHLORIDE 100 ML: 9 INJECTION, SOLUTION INTRAVENOUS at 11:37

## 2019-03-11 RX ADMIN — ASPIRIN 81 MG 81 MG: 81 TABLET ORAL at 08:00

## 2019-03-11 RX ADMIN — IOPAMIDOL 80 ML: 755 INJECTION, SOLUTION INTRAVENOUS at 11:37

## 2019-03-11 RX ADMIN — MAGNESIUM HYDROXIDE 30 ML: 400 SUSPENSION ORAL at 21:10

## 2019-03-11 RX ADMIN — PRAVASTATIN SODIUM 40 MG: 20 TABLET ORAL at 21:10

## 2019-03-11 RX ADMIN — Medication 10 ML: at 06:22

## 2019-03-11 RX ADMIN — LORAZEPAM 2 MG: 1 TABLET ORAL at 06:31

## 2019-03-11 RX ADMIN — POTASSIUM CHLORIDE 40 MEQ: 20 TABLET, EXTENDED RELEASE ORAL at 12:26

## 2019-03-11 RX ADMIN — LORAZEPAM 2 MG: 1 TABLET ORAL at 21:10

## 2019-03-11 RX ADMIN — PANTOPRAZOLE SODIUM 40 MG: 40 TABLET, DELAYED RELEASE ORAL at 16:44

## 2019-03-11 RX ADMIN — Medication 10 ML: at 21:10

## 2019-03-11 RX ADMIN — RIVAROXABAN 20 MG: 20 TABLET, FILM COATED ORAL at 08:00

## 2019-03-11 RX ADMIN — Medication 10 ML: at 13:47

## 2019-03-11 RX ADMIN — Medication 10 ML: at 11:37

## 2019-03-11 RX ADMIN — OXYCODONE HYDROCHLORIDE 10 MG: 5 TABLET ORAL at 06:31

## 2019-03-11 RX ADMIN — OXYCODONE HYDROCHLORIDE 10 MG: 5 TABLET ORAL at 21:10

## 2019-03-11 NOTE — PROGRESS NOTES
Problem: Self Care Deficits Care Plan (Adult)  Goal: *Acute Goals and Plan of Care (Insert Text)  1. Patient will complete self-feeding and grooming with set up and adaptive equipment as needed. 2. Patient will complete upper body dressing and bathing with stand by assistance and adaptive equipment as needed. 3. Patient will complete lower body dressing and bathing with stand by assistance and adaptive equipment as needed. 4. Patient will participate in E therapeutic exercises to increase strength in R hand to [facility] for increased independence and participation in ADLs and functional transfers. 5. Patient will participate in [address] [place] [address] therapeutic activities to increase coordination in R hand to [facility] for bimanual fine motor ADLs. 6. Patient will incorporate RUE into ADLs/activities 75% of the time to decrease risk of learned non-use. 7. Patient will complete functional transfers with supervision and adaptive equipment as needed. 8. Patient will complete functional mobility for ADLs with stand by assistance and adaptive equipment as needed. Timeframe: 7 visits      OCCUPATIONAL THERAPY: Initial Assessment, Daily Note and PM 3/11/2019  OBSERVATION: OT Visit Days: 1  Payor: Shaista Cunningham / Plan: [employer] HMO / Product Type: HMO /      NAME/AGE/GENDER: Мария Soliz is a 64 y.o. female   PRIMARY DIAGNOSIS:  Weakness of right arm [R29.898] <principal problem not specified> <principal problem not specified>       ICD-10: Treatment Diagnosis:    · Generalized Muscle Weakness (M62.81)  · Repeated Falls (R29.6)  · History of falling (Z91.81)  · Hemiplegia and hemiparesis following cerebral infarction affecting right dominant side (P95.650)   Precautions/Allergies:    Fall precautions   Penicillins      ASSESSMENT:     Ms. Mary Mead is a 64 y.o. female admitted with the above, MRI positive for \"Several small acute infarctions within the left frontal and parietal lobes\".  PMH significant for pancreatic cancer. At baseline, pt lives with  in a 2 level home and reports independence with ADLs and functional mobility, driving, and working as an RN. Pt endorses 3 recent falls due to weakness. Upon arrival, pt alert and agreeable to OT evaluation and treatment. BUE assessment revealed LUE WFL; AROM, strength, and coordination grossly decreased in R hand. Pt is R hand dominant. Pt completed bed mobility with SBA. Treatment initiated to include functional transfers, functional mobility within room, grooming, and BUE neuromuscular re-education exercises. Pt completed functional transfers with SBA and functional mobility with CGA, both with and without RW, requiring min cueing for RW management. Pt demonstrated decreased balance with several standing LOBs. Pt demonstrates decreased insight into balance deficits and decreased awareness of need for assistance. Pt practiced brushing teeth utilizing built up handle, spontaneously initiating with LUE. Pt educated on learned non-use and the importance of utilizing RUE and incorporating it into tasks. Pt utilized BUEs to open & apply toothpaste, brushed teeth with cueing and mirror for feedback using RUE, requiring LUE for frequent repositioning. Pt then utilized BUEs for task, with RUE as gross motor assist, with cueing. Pt donned/doffed shoes seated edge of bed using BUEs and SBA. Pt practiced BUE neuromuscular re-education exercises. See below. Pt left supine in bed with call bell within reach. Pt's deficits include decreased safety awareness, decreased insight into need for assistance, decreased RUE coordination, decreased, strength, activity tolerance, balance, functional mobility and functional transfers, all of which negatively impact safety and independence with ADLs. Michael Dornates is currently functioning below baseline and would benefit from continued OT to increase safety and independence with ADLs. Will follow.       This section established at most recent assessment   PROBLEM LIST (Impairments causing functional limitations):  1. Decreased Strength  2. Decreased ADL/Functional Activities  3. Decreased Transfer Abilities  4. Decreased Ambulation Ability/Technique  5. Decreased Balance  6. Decreased Activity Tolerance  7. Increased Fatigue  8. Decreased Knowledge of Precautions  9. Decreased [place] with Home Exercise Program  10. Decreased Cognition   INTERVENTIONS PLANNED: (Benefits and precautions of occupational therapy have been discussed with the patient.)  1. Activities of daily living training  2. Adaptive equipment training  3. Balance training  4. Clothing management  5. Cognitive training  6. Donning&doffing training  7. Stuart tech training  8. Hygiene training  9. Neuromuscular re-eduation  10. Re-evaluation  11. Therapeutic activity  12. Therapeutic exercise     TREATMENT PLAN: Frequency/Duration: Follow patient 3x/week to address above goals. Rehabilitation Potential For Stated Goals: Good     RECOMMENDED REHABILITATION/EQUIPMENT: (at time of discharge pending progress): Due to the probability of continued deficits (see above) this patient will likely need continued skilled occupational therapy after discharge. Equipment:    TBD                OCCUPATIONAL PROFILE AND HISTORY:   History of Present Injury/Illness (Reason for Referral):  See H&P. Past Medical History/Comorbidities:   Ms. Austen Andrew  has a past medical history of Cancer ([facility] [address] (Dx 11/2018), Hypercholesterolemia, Hypertension, and Pancreatic mass. Ms. Austen Andrew  has a past surgical history that includes hx heent; hx gyn; hx cholecystectomy; and pr abdomen surgery proc unlisted.   Social History/Living Environment:   Home Environment: Private residence  # Steps to Enter: 5  Rails to Enter: Yes  Hand Rails : Bilateral  One/Two Story Residence: Two story  Living Alone: No  Support Systems: Spouse/Significant Other/Partner  Patient Expects to be Discharged to[employer] residence  Current DME Used/Available at Home: None  Tub or Shower Type: Shower  Prior Level of Function/Work/Activity:  At baseline, pt lives with  in a 2 level home and reports independence with ADLs and functional mobility, driving, and working as an RN. Pt endorses 3 recent falls due to weakness. Dominant Side:         RIGHT  Personal Factors:          Sex:  female        Age:  64 y.o. Other factors that influence how disability is experienced by the patient:  Multiple co-morbidities    Number of Personal Factors/Comorbidities that affect the Plan of Care: Expanded review of therapy/medical records (1-2):  MODERATE COMPLEXITY   ASSESSMENT OF OCCUPATIONAL PERFORMANCE[de-identified]   Activities of Daily Living:   Basic ADLs (From Assessment) Complex ADLs (From Assessment)   Feeding: Minimum assistance  Oral Facial Hygiene/Grooming: Moderate assistance  Bathing: Moderate assistance  Upper Body Dressing: Minimum assistance  Lower Body Dressing: Moderate assistance  Toileting: Minimum assistance Instrumental ADL  Meal Preparation: Maximum assistance  Homemaking: Maximum assistance   Grooming/Bathing/Dressing Activities of Daily Living   Grooming  Brushing Teeth: Minimum assistance;Training to use affected extremity as a gross motor assistance  Cues: Mirror for visual feedback;Verbal cues provided Cognitive Retraining  Safety/Judgement: Decreased awareness of need for assistance;Decreased awareness of need for safety; Fall prevention                 Functional Transfers  Bathroom Mobility: Contact guard assistance     Bed/Mat Mobility  Supine to Sit: Stand-by assistance  Sit to Supine: Stand-by assistance  Sit to Stand: Stand-by assistance  Scooting: Stand-by assistance       Most Recent Physical Functioning:   Gross Assessment:  AROM: Generally decreased, functional(RUE)  PROM: Within functional limits(RUE)  Strength: Grossly decreased, non-functional(R hand)  Coordination: Grossly decreased, non-functional(R hand)  Sensation: Intact(BUEs to light touch)               Posture:     Balance:  Sitting: Intact  Standing: Impaired  Standing - Static: Good  Standing - Dynamic : Fair Bed Mobility:  Supine to Sit: Stand-by assistance  Sit to Supine: Stand-by assistance  Scooting: Stand-by assistance  Wheelchair Mobility:     Transfers:  Sit to Stand: Stand-by assistance  Stand to Sit: Stand-by assistance            Patient Vitals for the past 6 hrs:   BP BP Patient Position SpO2 Pulse   03/11/19 1137 129/64 At rest 99 % 72   03/11/19 1500 119/79  100 % 80       Mental Status  Neurologic State: Alert  Orientation Level: Appropriate for age  Cognition: Follows commands  Perception: Cues to attend to right side of body  Perseveration: No perseveration noted  Safety/Judgement: Decreased awareness of need for assistance, Decreased awareness of need for safety, Fall prevention                          Physical Skills Involved:  1. Range of Motion  2. Balance  3. Strength  4. Activity Tolerance  5. Fine Motor Control  6. Gross Motor Control  7. Coordination Cognitive Skills Affected (resulting in the inability to perform in a timely and safe manner):  1. Executive Function Psychosocial Skills Affected:  1. Habits/Routines  2. Environmental Adaptation  3. Social Interaction  4. Emotional Regulation  5. Self-Awareness  6. Awareness of Others  7. Social Roles   Number of elements that affect the Plan of Care: 5+:  HIGH COMPLEXITY   CLINICAL DECISION MAKING:   [employer] AM-[employer]AC 6 Clicks   Daily Activity Inpatient Short Form  How much help from another person does the patient currently need. .. Total A Lot A Little None   1. Putting on and taking off regular lower body clothing? [] 1   [x] 2   [] 3   [] 4   2. Bathing (including washing, rinsing, drying)? [] 1   [x] 2   [] 3   [] 4   3. Toileting, which includes using toilet, bedpan or urinal?   [] 1   [] 2   [x] 3   [] 4   4.   Putting on and taking off regular upper body clothing? [] 1   [] 2   [x] 3   [] 4   5. Taking care of personal grooming such as brushing teeth? [] 1   [x] 2   [] 3   [] 4   6. Eating meals? [] 1   [] 2   [x] 3   [] 4   © 2007, Trustees of [facility] [employer], under license to [employer]. All rights reserved      Score:  Initial: 15 3/11/19 Most Recent: X (Date: -- )    Interpretation of Tool:  Represents activities that are increasingly more difficult (i.e. Bed mobility, Transfers, Gait). Medical Necessity:     · Patient demonstrates good rehab potential due to higher previous functional level. Reason for Services/Other Comments:  · Patient continues to require skilled intervention due to inability to complete ADLs at prior level of independence. Use of outcome tool(s) and clinical judgement create a POC that gives a: MODERATE COMPLEXITY         TREATMENT:   (In addition to Assessment/Re-Assessment sessions the following treatments were rendered)     Pre-treatment Symptoms/Complaints:    Pain: Initial:   Pain Intensity 1: 0  Post Session:  same     Self Care: (15 minutes): Procedure(s) (per grid) utilized to improve and/or restore self-care/home management as related to dressing and grooming. Required minimal visual, verbal and manual cueing to facilitate activities of daily living skills and compensatory activities. Pt completed functional transfers with SBA and functional mobility with CGA, both with and without RW, requiring min cueing for RW management. Pt demonstrated decreased balance with several standing LOBs. Pt demonstrates decreased insight into balance deficits and decreased awareness of need for assistance. Pt practiced brushing teeth utilizing built up handle, spontaneously initiating with LUE. Pt educated on learned non-use and the importance of utilizing RUE and incorporating it into tasks.  Pt utilized BUEs to open & apply toothpaste, brushed teeth with cueing and mirror for feedback using RUE, requiring LUE for frequent repositioning. Pt then utilized BUEs for task, with RUE as gross motor assist, with cueing. Pt donned/doffed shoes seated edge of bed using BUEs and SBA. Neuromuscular Re-education: ( 8 minutes):  Exercise/activities per grid below to improve coordination, kinesthetic sense and proprioception. Required minimal visual and verbal cues to promote coordination of right, upper extremity(s) and promote motor control of right, upper extremity(s). Pt practiced BUE rapid alternating movements x25 with cueing for technique to increase RUE coordination. Pt practiced BUE full fist flexion/extension x10 and RUE opposition. Braces/Orthotics/Lines/Etc:   · Telemetry  · O2 Device: Room air  Treatment/Session Assessment:    Response to Treatment:  Tolerated well with good motivation  · Interdisciplinary Collaboration:   o Occupational Therapist  o Certified Nursing Assistant/Patient Care Technician  · After treatment position/precautions:   o Supine in bed  o Bed/Chair-wheels locked  o Bed in low position  o Call light within reach   · Compliance with Program/Exercises: Compliant all of the time. · Recommendations/Intent for next treatment session: \"Next visit will focus on advancements to more challenging activities and reduction in assistance provided\".   Total Treatment Duration:  OT Patient Time In/Time Out  Time In: 1447  Time Out: [facility], OTR/L

## 2019-03-11 NOTE — PROGRESS NOTES
03/10/19 1925   Dual Skin Pressure Injury Assessment   Dual Skin Pressure Injury Assessment WDL   Second Care Provider (Based on [address] [place] [address]) Leila Jung RN

## 2019-03-11 NOTE — PROGRESS NOTES
Problem: Falls - Risk of  Goal: *Absence of Falls  Document Romero Fall Risk and appropriate interventions in the flowsheet. Outcome: Progressing Towards Goal  Fall Risk Interventions:  Mobility Interventions: Communicate number of staff needed for ambulation/transfer         Medication Interventions: Evaluate medications/consider consulting pharmacy         History of Falls Interventions:  Investigate reason for fall

## 2019-03-11 NOTE — PROGRESS NOTES
END OF SHIFT NOTE:    Intake/Output  03/11 0701 - 03/11 1900  In: 358 [P.O.:358]  Out: 700 [Urine:700]   Voiding: YES  Catheter: NO  Drain:              Stool:  0 occurrences. Emesis:  0 occurrences. VITAL SIGNS  Patient Vitals for the past 12 hrs:   Temp Pulse Resp BP SpO2   03/11/19 1500 97.9 °F (36.6 °C) 80 18 119/79 100 %   03/11/19 1137 97.9 °F (36.6 °C) 72 18 129/64 99 %   03/11/19 0643 98.3 °F (36.8 °C) 78 18 118/76 97 %       Pain Assessment  Pain 1  Pain Scale 1: Visual (03/11/19 1447)  Pain Intensity 1: 0 (03/11/19 1447)  Patient Stated Pain Goal: 0 (03/11/19 1400)  Pain Reassessment 1: Yes (03/11/19 0711)  Pain Onset 1: pta (03/11/19 8288)  Pain Location 1: Abdomen (03/11/19 7972)  Pain Orientation 1: Upper (03/11/19 3150)  Pain Description 1: Throbbing (03/11/19 0184)  Pain Intervention(s) 1: Medication (see MAR) (03/11/19 0773)    Ambulating  Yes    Additional Information: Brain MRI and CTA of neck and brain completed. Neuro consulted and reviewed CTA results. Speech consult completed. Consult to stroke coordinator still to be done. Oncology to see pt tomorrow. Pt continues cardiac monitoring. Neuro status remains unchanged through this shift. Shift report given to oncoming nurse at the bedside.     Yesy Portillo

## 2019-03-11 NOTE — PROGRESS NOTES
CM reviewed MR per recommendation from PT pt will require DME and [address] at time of discharge. DME 3n1 bedside commode, rolling walker (at time of discharge)  Home Health with PT and OT. CM will continue to follow    Care Management Interventions  PCP Verified by CM:  Yes  Transition of Care Consult (CM Consult): Discharge Planning  Current Support Network: Own Home, Lives with Spouse, Family Lives Nearby  Confirm Follow Up Transport: Family  Plan discussed with Pt/Family/Caregiver: Yes  Freedom of Choice Offered: Yes  Manny Provided?: No

## 2019-03-11 NOTE — PROGRESS NOTES
Hospitalist Progress Note     Admit Date:  3/10/2019  3:28 PM   Name:  Elyssa Recinos   Age:  64 y.o.  :  1960   MRN:  [id number]   PCP:  Nelly Jay MD  Treatment Team: Attending Provider: Neil Blanco DO; Utilization Review: Tona Wallace RN; Physical Therapist: Genoveva Green, PT; Occupational Therapist: Robert Ortega, OTR/L; Consulting Provider: Rick Gomez MD    Subjective:      from admit h and p HPI:  Patient is a 65 yo CF with a history of HTN, HLD, pancreatic cancer who presented to the ED with c/o right sided weakness. Patient states she noticed decreased dexterity in her right hand last week. Deemed as neuropathy by oncology. Since then has developed into right hand, arm and right leg weakness, ataxia, decreased coordination in right arm. Denies facial asymmetry, aphasia, limited ROM, syncope, dizziness, chest pain, abd pain, n/v/d, seizures, loss of bowel or bladder. ED provider has asked our hospitalist service to evaluate and further manage the patient.     Patient was diagnosed with pancreatic cancer in 2018. She underwent s/p spleenectomy, gastro jejunostomy and biopsies. FOLFIRINOX Dec 2018 halted with admission for acute right-sided flank pain found to be multiple right renal infarcts. Anticoagulated on xarelto. She had made a decision that the toxicity of her FOLFIRINOX was more than she wanted to endure and ultimately chose to discontinue her chemotherapy. PET scan in 2019 showed her tumor localized to her pancreas. Plan was to start radiation therapy concurrent with radiation sensitizing chemo treatment. 3/11/19:  Cc: right arm weakness  Pt made aware of mri findings of small acute infarcts left frontal and left parietal and suspected slow flow left ica flow. Tearful and upset and asked questions re: what will happen re: plan for radiation pancreatic cancer.  Discussed stroke workup to include cta , neurology consultation , pt ot slt, and antiplatelet to her xarelto(recent renal emboli) and statin. She appears to understand. Tele monitoring re: hx possible embolic disease. Complete ROS done and is as stated in HPI or otherwise negative            Objective:     Patient Vitals for the past 24 hrs:   Temp Pulse Resp BP SpO2   03/11/19 1137 97.9 °F (36.6 °C) 72 18 129/64 99 %   03/11/19 0643 98.3 °F (36.8 °C) 78 18 118/76 97 %   03/11/19 0427 98.1 °F (36.7 °C) 76 18 127/79 95 %   03/10/19 1952 98.4 °F (36.9 °C) 77 18 130/81 97 %   03/10/19 1841  89  141/67 96 %   03/10/19 1821  86 17 161/74 99 %   03/10/19 1700  85  150/73 100 %   03/10/19 1522 97.8 °F (36.6 °C) 96 18 148/63 99 %     Oxygen Therapy  O2 Sat (%): 99 % (03/11/19 1137)  Pulse via Oximetry: 90 beats per minute (03/10/19 1841)  O2 Device: Room air (03/11/19 0225)    Intake/Output Summary (Last 24 hours) at 3/11/2019 1153  Last data filed at 3/11/2019 1137  Gross per 24 hour   Intake 238 ml   Output 100 ml   Net 138 ml         Physical Examination:  Physical Exam   Constitutional: No distress. HENT:   Nose: Nose normal.   Mouth/Throat: No oropharyngeal exudate. Eyes: EOM are normal. No scleral icterus. Neck: No tracheal deviation present. No thyromegaly present. Cardiovascular: Normal rate and regular rhythm. Exam reveals no friction rub. Pulmonary/Chest: No respiratory distress. She has no wheezes. Abdominal: She exhibits no distension. There is no tenderness. There is no rebound. Musculoskeletal: She exhibits no tenderness or deformity. Neurological: She is alert. GCS score is 15. Right arm and hand weakness   Skin: Skin is dry. She is not diaphoretic. No pallor. Psychiatric: Memory and affect normal.       Data Review:  I have reviewed all labs, meds, telemetry events, and studies from the last 24 hours.     Recent Results (from the past 24 hour(s))   CBC WITH AUTOMATED DIFF    Collection Time: 03/10/19  3:30 PM   Result Value Ref Range    WBC 24.2 (H) 4.3 - 11.1 K/uL    RBC 3.99 (L) 4.05 - 5.2 M/uL    HGB 9.8 (L) 11.7 - 15.4 g/dL    HCT 33.1 (L) 35.8 - 46.3 %    MCV 83.0 79.6 - 97.8 FL    MCH 24.6 (L) 26.1 - 32.9 PG    MCHC 29.6 (L) 31.4 - 35.0 g/dL    RDW 17.8 (H) 11.9 - 14.6 %    PLATELET 245 (H) 420 - 450 K/uL    MPV 9.1 (L) 9.4 - 12.3 FL    ABSOLUTE NRBC 0.00 0.0 - 0.2 K/uL    DF AUTOMATED      NEUTROPHILS 89 (H) 43 - 78 %    LYMPHOCYTES 8 (L) 13 - 44 %    MONOCYTES 2 (L) 4.0 - 12.0 %    EOSINOPHILS 0 (L) 0.5 - 7.8 %    BASOPHILS 1 0.0 - 2.0 %    IMMATURE GRANULOCYTES 1 0.0 - 5.0 %    ABS. NEUTROPHILS 21.5 (H) 1.7 - 8.2 K/UL    ABS. LYMPHOCYTES 1.9 0.5 - 4.6 K/UL    ABS. MONOCYTES 0.5 0.1 - 1.3 K/UL    ABS. EOSINOPHILS 0.0 0.0 - 0.8 K/UL    ABS. BASOPHILS 0.1 0.0 - 0.2 K/UL    ABS. IMM. GRANS. 0.2 0.0 - 0.5 K/UL   METABOLIC PANEL, COMPREHENSIVE    Collection Time: 03/10/19  3:30 PM   Result Value Ref Range    Sodium 137 136 - 145 mmol/L    Potassium 3.5 3.5 - 5.1 mmol/L    Chloride 101 98 - 107 mmol/L    CO2 27 21 - 32 mmol/L    Anion gap 9 7 - 16 mmol/L    Glucose 147 (H) 65 - 100 mg/dL    BUN 9 6 - 23 MG/DL    Creatinine 0.87 0.6 - 1.0 MG/DL    GFR est AA >60 >60 ml/min/1.73m2    GFR est non-AA >60 >60 ml/min/1.73m2    Calcium 9.4 8.3 - 10.4 MG/DL    Bilirubin, total 0.2 0.2 - 1.1 MG/DL    ALT (SGPT) 11 (L) 12 - 65 U/L    AST (SGOT) 11 (L) 15 - 37 U/L    Alk.  phosphatase 121 50 - 136 U/L    Protein, total 7.7 6.3 - 8.2 g/dL    Albumin 3.1 (L) 3.5 - 5.0 g/dL    Globulin 4.6 (H) 2.3 - 3.5 g/dL    A-G Ratio 0.7 (L) 1.2 - 3.5     TROPONIN I    Collection Time: 03/10/19  3:30 PM   Result Value Ref Range    Troponin-I, Qt. <0.02 (L) 0.02 - 0.05 NG/ML   VITAMIN B12    Collection Time: 03/10/19  3:30 PM   Result Value Ref Range    Vitamin B12 458 193 - 986 pg/mL   FOLATE    Collection Time: 03/10/19  3:30 PM   Result Value Ref Range    Folate 14.8 3.1 - 17.5 ng/mL   TSH 3RD GENERATION    Collection Time: 03/10/19  3:30 PM   Result Value Ref Range    TSH 1.200 0.358 - 3.740 uIU/mL   EKG, 12 LEAD, INITIAL    Collection Time: 03/10/19  4:44 PM   Result Value Ref Range    Ventricular Rate 84 BPM    Atrial Rate 84 BPM    P-R Interval 132 ms    QRS Duration 116 ms    Q-T Interval 404 ms    QTC Calculation (Bezet) 477 ms    Calculated P Axis 49 degrees    Calculated R Axis -100 degrees    Calculated T Axis 31 degrees    Diagnosis       !! AGE AND GENDER SPECIFIC ECG ANALYSIS !!   Normal sinus rhythm  Low voltage QRS  Right bundle branch block  Possible Anterolateral infarct , age undetermined  Abnormal ECG  When compared with ECG of 09-NOV-2018 15:15,  Borderline criteria for Anterior infarct are now Present  Borderline criteria for Anterolateral infarct are now Present  T wave inversion less evident in Anterior leads     LIPID PANEL    Collection Time: 03/11/19  2:49 AM   Result Value Ref Range    LIPID PROFILE          Cholesterol, total 193 <200 MG/DL    Triglyceride 122 35 - 150 MG/DL    HDL Cholesterol 35 (L) 40 - 60 MG/DL    LDL, calculated 133.6 (H) <100 MG/DL    VLDL, calculated 24.4 (H) 6.0 - 23.0 MG/DL    CHOL/HDL Ratio 5.5     HEMOGLOBIN A1C WITH EAG    Collection Time: 03/11/19  2:49 AM   Result Value Ref Range    Hemoglobin A1c 5.8 4.8 - 6.0 %    Est. average glucose 120 mg/dL   CBC W/O DIFF    Collection Time: 03/11/19  2:49 AM   Result Value Ref Range    WBC 18.2 (H) 4.3 - 11.1 K/uL    RBC 3.38 (L) 4.05 - 5.2 M/uL    HGB 8.5 (L) 11.7 - 15.4 g/dL    HCT 28.1 (L) 35.8 - 46.3 %    MCV 83.1 79.6 - 97.8 FL    MCH 25.1 (L) 26.1 - 32.9 PG    MCHC 30.2 (L) 31.4 - 35.0 g/dL    RDW 17.8 (H) 11.9 - 14.6 %    PLATELET 811 (H) 237 - 450 K/uL    MPV 9.2 (L) 9.4 - 12.3 FL    ABSOLUTE NRBC 0.00 0.0 - 0.2 K/uL        All Micro Results     None          Current Meds:  Current Facility-Administered Medications   Medication Dose Route Frequency    fentaNYL (DURAGESIC) 12 mcg/hr patch 1 Patch  1 Patch TransDERmal Q72H    LORazepam (ATIVAN) tablet 2 mg  2 mg Oral Q6H PRN    magnesium hydroxide (MILK OF MAGNESIA) 400 mg/5 mL oral suspension 30 mL  30 mL Oral QHS    oxyCODONE IR (ROXICODONE) tablet 10 mg  10 mg Oral Q6H PRN    pantoprazole (PROTONIX) tablet 40 mg  40 mg Oral ACB&D    rivaroxaban (XARELTO) tablet 20 mg  20 mg Oral DAILY    sodium chloride (NS) flush 5-40 mL  5-40 mL IntraVENous Q8H    sodium chloride (NS) flush 5-40 mL  5-40 mL IntraVENous PRN    ondansetron (ZOFRAN) injection 4 mg  4 mg IntraVENous Q6H PRN    aspirin chewable tablet 81 mg  81 mg Oral DAILY    pravastatin (PRAVACHOL) tablet 40 mg  40 mg Oral QHS    acetaminophen (TYLENOL) tablet 650 mg  650 mg Oral Q4H PRN       Diet:  DIET NUTRITIONAL SUPPLEMENTS  DIET REGULAR    Other Studies (last 24 hours):  Mri Brain Wo Cont    Result Date: 3/11/2019  MRI brain without contrast: 03/11/2019 History: Abnormal gait (ataxia); Coordination changes, new or progressive; Neuro deficit, acute, single, progressing; h/o current pancreatic cancer. Imaging sequences: Sagittal short TR/short TE, axial short TR/short TE, long TR/long TE, FLAIR, gradient recall, diffusion weighted images and ADC mapping. Coronal FLAIR. Imaging was performed on a 1.5 Lily magnet. Comparison: None. Correlation was made to the CT scan of the brain 03/10/2019. Findings: The ventricles are normal in size and configuration. There are no extra-axial fluid collections. There is asymmetric mild signal abnormality involving the left internal carotid artery concerning for slow flow. No evidence of intraparenchymal hemorrhage or mass effect is identified. There are several small foci of acute infarction within the left frontal and parietal lobes. Elsewhere, there is minor patchy and few punctate foci of T2 hyperintensity, likely representing the sequela of chronic small vessel ischemic change, unremarkable for age The visualized mastoid air cells and paranasal sinuses are well pneumatized and aerated. Impression: 1.  Several small acute infarctions within the left frontal and parietal lobes. 2. Suspected slow flow involving the left internal carotid artery. Consider a CTA head of the and neck if there is further clinical concern. Ct Head Wo Cont    Result Date: 3/10/2019  CT HEAD WITHOUT CONTRAST. INDICATION: Right-sided weakness with loss of dexterity in the hands. COMPARISON: None. TECHNIQUE:   5 mm axial scans from the skull base to the vertex. Our CT scanners use one or more of the following:  Automated exposure control, adjustment of the mA and or kV according to patient size, iterative reconstruction. FINDINGS:  No acute intraparenchymal hemorrhage or abnormal extra-axial fluid collection. The ventricles are normal size. Left-sided white matter low attenuation is present, nonspecific, likely chronic small vessel disease. Demyelination can have similar appearance. No midline shift or mass effect. Included portion of the paranasal sinuses and orbits grossly unremarkable. IMPRESSION:  Low-attenuation foci, left more than right, nonspecific, likely chronic small vessel disease. Demyelination can have a similar appearance.        Assessment and Plan:     Hospital Problems as of 3/11/2019 Date Reviewed: 3/10/2019          Codes Class Noted - Resolved POA    Weakness of right arm ICD-10-CM: R29.898  ICD-9-CM: 729.89  3/10/2019 - Present Unknown        Malignant neoplasm of tail of pancreas (HCC) (Chronic) ICD-10-CM: C25.2  ICD-9-CM: 157.2  12/6/2018 - Present Yes        Hypercholesterolemia ICD-10-CM: E78.00  ICD-9-CM: 272.0  Unknown - Present Yes        Hypertension (Chronic) ICD-10-CM: I10  ICD-9-CM: 401.9  Unknown - Present Yes              A/P:    Left frontal and left parietal small acute infarcts with left ICA slow flow  --cta, neuro consult, continue tele eval for afib, continue factor xa inhibitor and antiplatelet and statin , pt ot slt, cva coordinator    Hx of embolic disease renal --on factor Xa inh.        Pancreatic Cancer  - s/p spleenectomy, gastro jejunostomy and biopsies  - FOLFIRINOX Dec 2018 halted with admission for acute right-sided flank pain found to be multiple right renal infarcts. Anticoagulated on xarelto. - plan for radiation therapy concurrent with radiation sensitizing chemo treatment. - WILL CONSULT ONCOLOGY FOR possible new plan of care re: above.     Hypertension  -  Continue home antihypertensives.    -  Monitor and trend BP.      HLD  - Continue Statin        Further management depends on patient progress.     dvt prophyl: xarelto  Code Status: Full Code

## 2019-03-11 NOTE — CONSULTS
Impression: Left internal carotid artery occlusion with border zone infarctions in the left hemisphere. I suspect the patient had a gradually developing left ICA stenosis with establishment of collateral flow artery occlusion which limited the extent of damage. Alternatively the patient could've had arterial dissection possibly secondary to repeated  vomiting during Chemotherapy. I doubt that this is related to her prior history of renal function. Similarly I doubt that this pattern of infarction is secondary to cancer related hypercoagulability or an embolic process. Recommendation: awaiting results of echocardiogram and lipid panel. Patient is indicated that her goals and treatment are to have as much quality time as possible and she wishes to be at home. She is not interested in inpatient rehabilitation. Her gait is well preserved and she has good use of her left hand. Based on her preferences I would argue for early discharge home. Continue aspirin and pravastatin    Chief complaint right hand weakness    History:    The patient is an unfortunate 74-year-old L with a history of pancreatic cancer who developed gradual onset of difficulty using the right hand starting last week. At first the symptoms were thought to be due to a peripheral nerve entrapment but he may continue to progress an MRI of the brain was performed which showed multiple small left hemispheric infarcts especially in the DWAINE-MCA border zone) strangle perles) along with reduced flow in the internal carotid artery. The patient denies any chest pain palpitations or tachycardia. She has no prior history of TIA symptoms    Patient has multiple conventional cardiovascular risk factors including hypertension and hyperlipidemia and tobacco abuse. Angeal Garcia   Past Medical History:   Diagnosis Date    Cancer [facility]) Dx 11/2018    pancreaitic cancer    Hypercholesterolemia     Hypertension     managed with meds    Pancreatic mass      Past Surgical History:   Procedure Laterality Date    ABDOMEN SURGERY PROC UNLISTED      Exp Lap, resection pancreatitc mass    HX CHOLECYSTECTOMY      HX GYN      JULIO C BSO    HX HEENT      jaw surgery     Family History   Problem Relation Age of Onset    Elevated Lipids Mother     Hypertension Mother     Heart Disease Mother     Stroke Mother     Elevated Lipids Father     Hypertension Father     Heart Disease Father      Social History     Tobacco Use    Smoking status: Current Every Day Smoker     Packs/day: 0.50     Years: 20.00     Pack years: 10.00     Last attempt to quit: 2018     Years since quittin.3    Smokeless tobacco: Never Used   Substance Use Topics    Alcohol use:  Yes     Alcohol/week: 0.0 oz     Frequency: Never     Comment: Socially    Drug use: No       Current Facility-Administered Medications:     fentaNYL (DURAGESIC) 12 mcg/hr patch 1 Patch, 1 Patch, TransDERmal, Q72H, Lizette Nevarez PA, 1 Patch at 03/10/19 2138    LORazepam (ATIVAN) tablet 2 mg, 2 mg, Oral, Q6H PRN, BENJY Alegre, 2 mg at 19 0631    magnesium hydroxide (MILK OF MAGNESIA) 400 mg/5 mL oral suspension 30 mL, 30 mL, Oral, QHS, Lizette Nevarez PA, 30 mL at 03/10/19 205    oxyCODONE IR (ROXICODONE) tablet 10 mg, 10 mg, Oral, Q6H PRN, BENJY Alegre, 10 mg at 19 0631    pantoprazole (PROTONIX) tablet 40 mg, 40 mg, Oral, ACB&D, BENJY Alegre, 40 mg at 19 0631    rivaroxaban (XARELTO) tablet 20 mg, 20 mg, Oral, DAILY, Aileen Nevarez PA, 20 mg at 19 0800    sodium chloride (NS) flush 5-40 mL, 5-40 mL, IntraVENous, Q8H, Aileen Nevarez PA, 10 mL at 19 1347    sodium chloride (NS) flush 5-40 mL, 5-40 mL, IntraVENous, PRN, Aileen Hernandez PA    ondansetron (ZOFRAN) injection 4 mg, 4 mg, IntraVENous, Q6H PRN, BENJY Alegre    aspirin chewable tablet 81 mg, 81 mg, Oral, DAILY, Aileen Nevarez PA, 81 mg at 19 0800    pravastatin (PRAVACHOL) tablet 40 mg, 40 mg, Oral, QHS, Ivania Nevarez [place]    acetaminophen (TYLENOL) tablet 650 mg, 650 mg, Oral, Q4H BHAVANAN, BENJY Hidalgo    Allergies   Allergen Reactions    Penicillins Unknown (comments)     Childhood allergy. 12 point ROS otherwise negative    Visit Vitals  /64 (BP 1 Location: Left arm, BP Patient Position: At rest)   Pulse 72   Temp 97.9 °F (36.6 °C)   Resp 18   Ht 5' 3\" (1.6 m)   Wt 183 lb 14.4 oz (83.4 kg)   SpO2 99%   Breastfeeding? No   BMI 32.58 kg/m²     General: well nourished, appears stated age    Eyes: no proptosis or exophthalmos; conjunctivae clear, sclerae non-icteric    Chest: clear to auscultation    Cardiac: normal S1 S2; no murmurs gallop or rubs  Decreased carotid pulsation on the left no bruit    Neurological:    MSE: alert, oriented times 3; fluent speech; no paraphasic errors; follows commands without difficulty    CN 2: visual fields full; no afferent pupillary defect; VA not checked; fundoscopic exam ... CN 3,4,6: Pupils symmetrical in size, reactive to light directly and consensually; no ptosis; full versions and ductions  CN 5: facial sensation intact to light touch and pin prick. Corneal reflex . .. CN 7: Symmetrical facial tone and movements  CN 8 responds to spoken voice  CN 9,10; palate symmetrical gag intact  CN 11: head turn and shoulder shrug intact  CN 12: tongue midline without atrophy or fasiculations    Motor:  Power 5/5 UE and LE On the left. On the right 4 minus/5 motor power with positive pronator drift. Fine motor movements symmetrical  Tone normal  Atrophy: absent  Gait: Stable physiological gait    Cerebellar:  Finger to nose; heel to shin intact  Tandem intact    Sensory  Romberg negative  Intact to primary modalities in all 4 extremities    Reflexes    Symmetrical and normally active at 2+ in UE and LE  Plantar response flexor bilaterally    MRI Results (most recent):  Results from Hospital Encounter encounter on 03/10/19   MRI BRAIN WO CONT    Narrative MRI brain without contrast: 03/11/2019    History: Abnormal gait (ataxia); Coordination changes, new or progressive; Neuro  deficit, acute, single, progressing; h/o current pancreatic cancer. Imaging sequences: Sagittal short TR/short TE, axial short TR/short TE, long  TR/long TE, FLAIR, gradient recall, diffusion weighted images and ADC mapping. Coronal FLAIR. Imaging was performed on a 1.5 Lily magnet. Comparison: None. Correlation was made to the CT scan of the brain 03/10/2019. Findings: The ventricles are normal in size and configuration. There are no  extra-axial fluid collections. There is asymmetric mild signal abnormality  involving the left internal carotid artery concerning for slow flow. No  evidence of intraparenchymal hemorrhage or mass effect is identified. There are  several small foci of acute infarction within the left frontal and parietal  lobes. Elsewhere, there is minor patchy and few punctate foci of T2  hyperintensity, likely representing the sequela of chronic small vessel ischemic  change, unremarkable for age     The visualized mastoid air cells and paranasal sinuses are well pneumatized and  aerated. Impression Impression:  1. Several small acute infarctions within the left frontal and parietal lobes. 2. Suspected slow flow involving the left internal carotid artery. Consider a  CTA head of the and neck if there is further clinical concern. CT Results (most recent):  Results from Hospital Encounter encounter on 03/10/19   CT HEAD WO CONT    Narrative CT HEAD WITHOUT CONTRAST. INDICATION: Right-sided weakness with loss of dexterity in the hands. COMPARISON: None. TECHNIQUE:   5 mm axial scans from the skull base to the vertex. Our CT  scanners use one or more of the following:  Automated exposure control,  adjustment of the mA and or kV according to patient size, iterative  reconstruction.     FINDINGS:  No acute intraparenchymal hemorrhage or abnormal extra-axial fluid  collection. The ventricles are normal size. Left-sided white matter low  attenuation is present, nonspecific, likely chronic small vessel disease. Demyelination can have similar appearance. No midline shift or mass effect. Included portion of the paranasal sinuses and orbits grossly unremarkable. Impression IMPRESSION:  Low-attenuation foci, left more than right, nonspecific, likely  chronic small vessel disease. Demyelination can have a similar appearance. CTA head and neck reviewed Demonstrating complete occlusion of the left internal carotid artery    Recent Results (from the past 24 hour(s))   CBC WITH AUTOMATED DIFF    Collection Time: 03/10/19  3:30 PM   Result Value Ref Range    WBC 24.2 (H) 4.3 - 11.1 K/uL    RBC 3.99 (L) 4.05 - 5.2 M/uL    HGB 9.8 (L) 11.7 - 15.4 g/dL    HCT 33.1 (L) 35.8 - 46.3 %    MCV 83.0 79.6 - 97.8 FL    MCH 24.6 (L) 26.1 - 32.9 PG    MCHC 29.6 (L) 31.4 - 35.0 g/dL    RDW 17.8 (H) 11.9 - 14.6 %    PLATELET 389 (H) 601 - 450 K/uL    MPV 9.1 (L) 9.4 - 12.3 FL    ABSOLUTE NRBC 0.00 0.0 - 0.2 K/uL    DF AUTOMATED      NEUTROPHILS 89 (H) 43 - 78 %    LYMPHOCYTES 8 (L) 13 - 44 %    MONOCYTES 2 (L) 4.0 - 12.0 %    EOSINOPHILS 0 (L) 0.5 - 7.8 %    BASOPHILS 1 0.0 - 2.0 %    IMMATURE GRANULOCYTES 1 0.0 - 5.0 %    ABS. NEUTROPHILS 21.5 (H) 1.7 - 8.2 K/UL    ABS. LYMPHOCYTES 1.9 0.5 - 4.6 K/UL    ABS. MONOCYTES 0.5 0.1 - 1.3 K/UL    ABS. EOSINOPHILS 0.0 0.0 - 0.8 K/UL    ABS. BASOPHILS 0.1 0.0 - 0.2 K/UL    ABS. IMM.  GRANS. 0.2 0.0 - 0.5 K/UL   METABOLIC PANEL, COMPREHENSIVE    Collection Time: 03/10/19  3:30 PM   Result Value Ref Range    Sodium 137 136 - 145 mmol/L    Potassium 3.5 3.5 - 5.1 mmol/L    Chloride 101 98 - 107 mmol/L    CO2 27 21 - 32 mmol/L    Anion gap 9 7 - 16 mmol/L    Glucose 147 (H) 65 - 100 mg/dL    BUN 9 6 - 23 MG/DL    Creatinine 0.87 0.6 - 1.0 MG/DL    GFR est AA >60 >60 ml/min/1.73m2    GFR est non-AA >60 >60 ml/min/1.73m2    Calcium 9.4 8.3 - 10.4 MG/DL    Bilirubin, total 0.2 0.2 - 1.1 MG/DL    ALT (SGPT) 11 (L) 12 - 65 U/L    AST (SGOT) 11 (L) 15 - 37 U/L    Alk. phosphatase 121 50 - 136 U/L    Protein, total 7.7 6.3 - 8.2 g/dL    Albumin 3.1 (L) 3.5 - 5.0 g/dL    Globulin 4.6 (H) 2.3 - 3.5 g/dL    A-G Ratio 0.7 (L) 1.2 - 3.5     TROPONIN I    Collection Time: 03/10/19  3:30 PM   Result Value Ref Range    Troponin-I, Qt. <0.02 (L) 0.02 - 0.05 NG/ML   VITAMIN B12    Collection Time: 03/10/19  3:30 PM   Result Value Ref Range    Vitamin B12 458 193 - 986 pg/mL   FOLATE    Collection Time: 03/10/19  3:30 PM   Result Value Ref Range    Folate 14.8 3.1 - 17.5 ng/mL   TSH 3RD GENERATION    Collection Time: 03/10/19  3:30 PM   Result Value Ref Range    TSH 1.200 0.358 - 3.740 uIU/mL   EKG, 12 LEAD, INITIAL    Collection Time: 03/10/19  4:44 PM   Result Value Ref Range    Ventricular Rate 84 BPM    Atrial Rate 84 BPM    P-R Interval 132 ms    QRS Duration 116 ms    Q-T Interval 404 ms    QTC Calculation (Bezet) 477 ms    Calculated P Axis 49 degrees    Calculated R Axis -100 degrees    Calculated T Axis 31 degrees    Diagnosis       !! AGE AND GENDER SPECIFIC ECG ANALYSIS !!   Normal sinus rhythm  Low voltage QRS  Right bundle branch block  Possible Anterolateral infarct , age undetermined  Abnormal ECG  When compared with ECG of 09-NOV-2018 15:15,  Borderline criteria for Anterior infarct are now Present  Borderline criteria for Anterolateral infarct are now Present  T wave inversion less evident in Anterior leads     LIPID PANEL    Collection Time: 03/11/19  2:49 AM   Result Value Ref Range    LIPID PROFILE          Cholesterol, total 193 <200 MG/DL    Triglyceride 122 35 - 150 MG/DL    HDL Cholesterol 35 (L) 40 - 60 MG/DL    LDL, calculated 133.6 (H) <100 MG/DL    VLDL, calculated 24.4 (H) 6.0 - 23.0 MG/DL    CHOL/HDL Ratio 5.5     HEMOGLOBIN A1C WITH EAG    Collection Time: 03/11/19  2:49 AM   Result Value Ref Range    Hemoglobin A1c 5.8 4.8 - 6.0 %    Est. average glucose 120 mg/dL   CBC W/O DIFF    Collection Time: 03/11/19  2:49 AM   Result Value Ref Range    WBC 18.2 (H) 4.3 - 11.1 K/uL    RBC 3.38 (L) 4.05 - 5.2 M/uL    HGB 8.5 (L) 11.7 - 15.4 g/dL    HCT 28.1 (L) 35.8 - 46.3 %    MCV 83.1 79.6 - 97.8 FL    MCH 25.1 (L) 26.1 - 32.9 PG    MCHC 30.2 (L) 31.4 - 35.0 g/dL    RDW 17.8 (H) 11.9 - 14.6 %    PLATELET 337 (H) 357 - 450 K/uL    MPV 9.2 (L) 9.4 - 12.3 FL    ABSOLUTE NRBC 0.00 0.0 - 0.2 K/uL

## 2019-03-11 NOTE — PROGRESS NOTES
END OF SHIFT NOTE:    Intake/Output  No intake/output data recorded. Voiding: YES  Catheter: NO  Drain:              Stool:  0 occurrences. Emesis:  0 occurrences. VITAL SIGNS  Patient Vitals for the past 12 hrs:   Temp Pulse Resp BP SpO2   03/11/19 0643 98.3 °F (36.8 °C) 78 18 118/76 97 %   03/11/19 0427 98.1 °F (36.7 °C) 76 18 127/79 95 %   03/10/19 1952 98.4 °F (36.9 °C) 77 18 130/81 97 %       Pain Assessment  Pain 1  Pain Scale 1: Numeric (0 - 10) (03/11/19 8599)  Pain Intensity 1: 4 (03/11/19 1324)  Patient Stated Pain Goal: 0 (03/11/19 0679)  Pain Reassessment 1: Patient sleeping (03/11/19 0225)  Pain Onset 1: pta (03/11/19 1126)  Pain Location 1: Abdomen (03/11/19 3132)  Pain Orientation 1: Upper (03/11/19 4716)  Pain Description 1: Throbbing (03/11/19 3433)  Pain Intervention(s) 1: Medication (see MAR) (03/11/19 8988)    Ambulating  Yes    Additional Information:     Shift report given to oncoming nurse at the bedside.     Leopold Lamp, RN

## 2019-03-11 NOTE — PROGRESS NOTES
Occupational Therapy Note:  OT orders received, chart reviewed, and evaluation attempted. Patient getting ready to got to CT at this time per RN. Will check back as schedule permits and patient is available to initiate occupational therapy evaluation.    Thank you for this consult,   Aliyah Cooley, OTR/L

## 2019-03-11 NOTE — PROGRESS NOTES
LTG: Patient will tolerate least restrictive diet without signs/symptoms of aspiration at discharge for safe swallow function. STG: Pt will tolerate regular diet/thin liquids (single sip) without overt s/sx airway compromise  STG: Patient will participate in modified barium swallow study to objectively assess swallow function   STG: Patient will participate in full cognitive linguistic evaluation     OBSERVATION STATUS  Speech language pathology: bedside swallow note: Initial Assessment    NAME/AGE/GENDER: Katelyn Parmar is a 64 y.o. female  DATE: 3/11/2019  PRIMARY DIAGNOSIS: Weakness of right arm [R29.898]      ICD-10: Treatment Diagnosis: pharyngeal dysphagia R13.13  INTERDISCIPLINARY COLLABORATION: Registered Nurse  PRECAUTIONS/ALLERGIES: Penicillins ASSESSMENT:Based on the objective data described below, Ms. Davida Vaz presents with inconsistent throat clearing with serial sips of thin liquid. No overt s/sx of airway compromise with thin liquid via single cup or single straw drink, puree, mixed, or solid trials. Inconsistent mild throat clear with thin liquid wash following solid trial.  Recommend continue regular diet/thin liquids via single sip. Medications one at a time with liquid wash. Educated patient on safe swallowing guidelines and compensatory strategy of single sips to improve swallow safety. Will follow up for diet tolerance. May benefit from modified barium swallow study in future to objectively assess swallow function. Anticipate need for full cognitive linguistic evaluation. Patient will benefit from skilled intervention to address the below impairments. RN notified. ?????? ? ? This section established at most recent assessment??????????  PROBLEM LIST (Impairments causing functional limitations):  1. Pharyngeal dysphagia   REHABILITATION POTENTIAL FOR STATED GOALS: Good  PLAN OF CARE:   Patient will benefit from skilled intervention to address the following impairments.   RECOMMENDATIONS AND PLANNED INTERVENTIONS (Benefits and precautions of therapy have been discussed with the patient.):  · continue prescribed diet  MEDICATIONS:  · With liquid  ASPIRATION PRECAUTIONS:  · Slow rate of intake  · Small bites/sips  · Upright at 90 degrees during meal  COMPENSATORY STRATEGIES/MODIFICATIONS INCLUDING:  · Cup/sip  · Small sips and bites  OTHER RECOMMENDATIONS (including follow up treatment recommendations):   · Patient education   RECOMMENDED DIET MODIFICATIONS DISCUSSED WITH:  · Nursing  · Patient  FREQUENCY/DURATION: Continue to follow patient 2 times a week for duration of hospital stay to address above goals. RECOMMENDED REHABILITATION/EQUIPMENT: (at time of discharge pending progress): Due to the probability of continued deficits (see above) this patient will likely need continued skilled speech therapy after discharge. SUBJECTIVE:   Oriented to person. Decreased comprehension noted during orientation questions. Stated February as date. Asked year, patient gave her birth date again. Asked for current year again, patient stated current month. On third attempt, patient stated correct year. History of Present Injury/Illness: Ms. Maty Mahajan  has a past medical history of Cancer ([facility] [address] (Dx 11/2018), Hypercholesterolemia, Hypertension, and Pancreatic mass. .  She also  has a past surgical history that includes hx heent; hx gyn; hx cholecystectomy; and pr abdomen surgery proc unlisted. Present Symptoms:    Pain Scale 1: Numeric (0 - 10)  Pain Intensity 1: 0  Pain Location 1: Abdomen  Pain Intervention(s) 1: Medication (see MAR)  Current Medications:   No current facility-administered medications on file prior to encounter. Current Outpatient Medications on File Prior to Encounter   Medication Sig Dispense Refill    magnesium hydroxide (BARBOZA MILK OF MAGNESIA) 400 mg/5 mL suspension Take 45 mL by mouth nightly.       oxyCODONE IR (ROXICODONE) 10 mg tab immediate release tablet Take 1 Tab by mouth every six (6) hours as needed for Pain for up to 30 days. Max Daily Amount: 40 mg. 90 Tab 0    fentaNYL (DURAGESIC) 12 mcg/hr patch 1 Patch by TransDERmal route every seventy-two (72) hours for 30 days. Max Daily Amount: 1 Patch. 10 Patch 0    LORazepam (ATIVAN) 2 mg tablet Take 1 Tab by mouth every six (6) hours as needed for Anxiety. Max Daily Amount: 8 mg. 60 Tab 1    fish oil-omega-3 fatty acids 300-500 mg cap Take 1 Tab by mouth daily.  rivaroxaban (XARELTO) 20 mg tab tablet Take 1 Tab by mouth daily. 30 Tab 5    pantoprazole (PROTONIX) 40 mg tablet Take 1 Tab by mouth Before breakfast and dinner. 30 Tab 2    ondansetron hcl (ZOFRAN) 8 mg tablet TAKE 1 TABLET BY MOUTH EVERY 8 HOURS AS NEEDED FOR NAUSEA  2    lidocaine-prilocaine (EMLA) topical cream Apply  to affected area as needed for Pain. Apply to port site 45-60 minutes prior to lab appointment or infusion appointment. 30 g 2    promethazine (PHENERGAN) 25 mg tablet Take 1 Tab by mouth every six (6) hours as needed for Nausea (and vomiting). 90 Tab 2    capecitabine (XELODA) 500 mg tablet Take 2 ( 500mg) tabs twice a day until radiation complete including weekends 148 Tab 1    nystatin (MYCOSTATIN) 100,000 unit/mL suspension Take  by mouth four (4) times daily. swish and spit      lipase-protease-amylase (CREON) 36,000-114,000- 180,000 unit cpDR capsule Take 2 capsules with each meal and 1 with snacks. 126 Cap 0     Current Dietary Status:     Regular/Thin     Social History/Home Situation:    Home Environment: Private residence  One/Two Story Residence: Two story  Living Alone: No  Support Systems: Spouse/Significant Other/Partner, Family member(s), Child(keira)  Patient Expects to be Discharged to[de-identified] Private residence  Current DME Used/Available at Home: None  OBJECTIVE:   Respiratory Status:  Room air       MRI/CT Results:1. Several small acute infarctions within the left frontal and parietal lobes.   2. Suspected slow flow involving the left internal carotid artery. Consider a  CTA head of the and neck if there is further clinical concern. Oral Motor Structure/Speech:  Oral-Motor Structure/Motor Speech  Labial: No impairment  Dentition: Intact  Oral Hygiene: adequate  Lingual: No impairment    Cognitive and Communication Status:  Neurologic State: Alert  Orientation Level: Oriented to person;Oriented to place  Cognition: Follows commands             BEDSIDE SWALLOW EVALUATION  Oral Assessment:  Oral Assessment  Labial: No impairment  Dentition: Intact  Oral Hygiene: adequate  Lingual: No impairment  P.O. Trials:  Patient Position: upright in bed    The patient was given  the following:   Consistency Presented: Solid;Mixed consistency; Thin liquid  How Presented: Self-fed/presented;Spoon; Successive swallows;Cup/gulp;Straw    ORAL PHASE:  Bolus Acceptance: No impairment  Bolus Formation/Control: No impairment  Propulsion: No impairment     Oral Residue: Less than 10% of bolus    PHARYNGEAL PHASE:  Initiation of Swallow: No impairment  Laryngeal Elevation: Functional  Aspiration Signs/Symptoms: Clear throat;Delayed cough/throat clear  Vocal Quality: No impairment     Effective Modifications: Small sips and bites     Pharyngeal Phase Characteristics: Double swallow    OTHER OBSERVATIONS:  Rate/bite size: WNL   Endurance: WNL   Comments:         Tool Used: Dysphagia Outcome and Severity Scale (ELIOT)    Score Comments   Normal Diet  [] 7 With no strategies or extra time needed   Functional Swallow  [] 6 May have mild oral or pharyngeal delay       Mild Dysphagia    [x] 5 Which may require one diet consistency restricted (those who demonstrate penetration which is entirely cleared on MBS would be included)   Mild-Moderate Dysphagia  [] 4 With 1-2 diet consistencies restricted       Moderate Dysphagia  [] 3 With 2 or more diet consistencies restricted       Moderately Severe Dysphagia  [] 2 With partial PO strategies (trials with ST only)       Severe Dysphagia [] 1 With inability to tolerate any PO safely          Score:  Initial: 5 Most Recent: X (Date: --)   Interpretation of Tool: The Dysphagia Outcome and Severity Scale (ELIOT) is a simple, easy-to-use, 7-point scale developed to systematically rate the functional severity of dysphagia based on objective assessment and make recommendations for diet level, independence level, and type of nutrition. Payor: Chapin Mohr / Plan: [address] HMO / Product Type: HMO /     TREATMENT:    (In addition to Assessment/Re-Assessment sessions the following treatments were rendered)  Assessment/Reassessment only, no treatment provided today  MODALITIES:                                                                    ORAL MOTOR  EXERCISES:                                                                                                                                                                      LARYNGEAL / PHARYNGEAL EXERCISES:                                                                                                                                     __________________________________________________________________________________________________  Safety:   After treatment position/precautions:  · RN notified  · Upright in Bed  Treatment Assessment:  Participated in dysphagia evaluation without medical complications. Progression/Medical Necessity:   · Patient is expected to demonstrate progress in swallow strength, swallow timeliness, swallow function, diet tolerance and swallow safety to improve swallow safety, work toward diet advancement and decrease aspiration risk. Compliance with Program/Exercises: Will assess as treatment progresses  Reason for Continuation of Services/Other Comments:  · Patient continues to require skilled intervention due to medical complications.   Recommendations/Intent for next treatment session: \"Treatment next visit will focus on diet tolerance and cognitive linguistic evaluation\".     Total Treatment Duration:  Time In: 7798  Time Out: [address], [facility] FIDENC[facility] G[facility], CF-SLP

## 2019-03-11 NOTE — PROGRESS NOTES
Problem: Falls - Risk of  Goal: *Absence of Falls  Document Romero Fall Risk and appropriate interventions in the flowsheet.   Outcome: Progressing Towards Goal  Fall Risk Interventions:  Mobility Interventions: Communicate number of staff needed for ambulation/transfer, Patient to call before getting OOB         Medication Interventions: Teach patient to arise slowly, Patient to call before getting OOB, Evaluate medications/consider consulting pharmacy         History of Falls Interventions: Evaluate medications/consider consulting pharmacy, Consult care management for discharge planning, Investigate reason for fall

## 2019-03-12 ENCOUNTER — HOME HEALTH ADMISSION (OUTPATIENT)
Dept: HOME HEALTH SERVICES | Facility: HOME HEALTH | Age: 59
End: 2019-03-12

## 2019-03-12 ENCOUNTER — PATIENT OUTREACH (OUTPATIENT)
Dept: CASE MANAGEMENT | Age: 59
End: 2019-03-12

## 2019-03-12 VITALS
DIASTOLIC BLOOD PRESSURE: 82 MMHG | WEIGHT: 184.2 LBS | SYSTOLIC BLOOD PRESSURE: 139 MMHG | HEIGHT: 63 IN | RESPIRATION RATE: 18 BRPM | BODY MASS INDEX: 32.64 KG/M2 | OXYGEN SATURATION: 96 % | HEART RATE: 74 BPM | TEMPERATURE: 97.8 F

## 2019-03-12 PROBLEM — I63.9 CVA (CEREBRAL VASCULAR ACCIDENT) (HCC): Status: ACTIVE | Noted: 2019-03-12

## 2019-03-12 LAB
ALBUMIN SERPL-MCNC: 2.5 G/DL (ref 3.5–5)
ALBUMIN/GLOB SERPL: 0.7 {RATIO} (ref 1.2–3.5)
ALP SERPL-CCNC: 95 U/L (ref 50–136)
ALT SERPL-CCNC: 8 U/L (ref 12–65)
ANION GAP SERPL CALC-SCNC: 9 MMOL/L (ref 7–16)
AST SERPL-CCNC: 10 U/L (ref 15–37)
BASOPHILS # BLD: 0.2 K/UL (ref 0–0.2)
BASOPHILS NFR BLD: 1 % (ref 0–2)
BILIRUB SERPL-MCNC: 0.2 MG/DL (ref 0.2–1.1)
BUN SERPL-MCNC: 9 MG/DL (ref 6–23)
CALCIUM SERPL-MCNC: 8.8 MG/DL (ref 8.3–10.4)
CHLORIDE SERPL-SCNC: 106 MMOL/L (ref 98–107)
CO2 SERPL-SCNC: 27 MMOL/L (ref 21–32)
CREAT SERPL-MCNC: 0.77 MG/DL (ref 0.6–1)
DIFFERENTIAL METHOD BLD: ABNORMAL
EOSINOPHIL # BLD: 0.3 K/UL (ref 0–0.8)
EOSINOPHIL NFR BLD: 2 % (ref 0.5–7.8)
ERYTHROCYTE [DISTWIDTH] IN BLOOD BY AUTOMATED COUNT: 17.7 % (ref 11.9–14.6)
GLOBULIN SER CALC-MCNC: 3.5 G/DL (ref 2.3–3.5)
GLUCOSE SERPL-MCNC: 112 MG/DL (ref 65–100)
HCT VFR BLD AUTO: 27.4 % (ref 35.8–46.3)
HGB BLD-MCNC: 8.3 G/DL (ref 11.7–15.4)
IMM GRANULOCYTES # BLD AUTO: 0.2 K/UL (ref 0–0.5)
IMM GRANULOCYTES NFR BLD AUTO: 1 % (ref 0–5)
LYMPHOCYTES # BLD: 4.5 K/UL (ref 0.5–4.6)
LYMPHOCYTES NFR BLD: 28 % (ref 13–44)
MCH RBC QN AUTO: 25.1 PG (ref 26.1–32.9)
MCHC RBC AUTO-ENTMCNC: 30.3 G/DL (ref 31.4–35)
MCV RBC AUTO: 82.8 FL (ref 79.6–97.8)
MONOCYTES # BLD: 1.1 K/UL (ref 0.1–1.3)
MONOCYTES NFR BLD: 7 % (ref 4–12)
NEUTS SEG # BLD: 9.6 K/UL (ref 1.7–8.2)
NEUTS SEG NFR BLD: 61 % (ref 43–78)
NRBC # BLD: 0 K/UL (ref 0–0.2)
PLATELET # BLD AUTO: 785 K/UL (ref 150–450)
PLATELET COMMENTS,PCOM: ABNORMAL
PMV BLD AUTO: 9.1 FL (ref 9.4–12.3)
POTASSIUM SERPL-SCNC: 3.8 MMOL/L (ref 3.5–5.1)
PROT SERPL-MCNC: 6 G/DL (ref 6.3–8.2)
RBC # BLD AUTO: 3.31 M/UL (ref 4.05–5.2)
RBC MORPH BLD: ABNORMAL
SODIUM SERPL-SCNC: 142 MMOL/L (ref 136–145)
WBC # BLD AUTO: 15.9 K/UL (ref 4.3–11.1)
WBC MORPH BLD: ABNORMAL

## 2019-03-12 PROCEDURE — 74011250637 HC RX REV CODE- 250/637: Performed by: PHYSICIAN ASSISTANT

## 2019-03-12 PROCEDURE — 99218 HC RM OBSERVATION: CPT

## 2019-03-12 PROCEDURE — 80053 COMPREHEN METABOLIC PANEL: CPT

## 2019-03-12 PROCEDURE — 92523 SPEECH SOUND LANG COMPREHEN: CPT

## 2019-03-12 PROCEDURE — 65660000000 HC RM CCU STEPDOWN

## 2019-03-12 PROCEDURE — 97161 PT EVAL LOW COMPLEX 20 MIN: CPT

## 2019-03-12 PROCEDURE — 85025 COMPLETE CBC W/AUTO DIFF WBC: CPT

## 2019-03-12 PROCEDURE — 92526 ORAL FUNCTION THERAPY: CPT

## 2019-03-12 PROCEDURE — 36591 DRAW BLOOD OFF VENOUS DEVICE: CPT

## 2019-03-12 RX ORDER — GUAIFENESIN 100 MG/5ML
81 LIQUID (ML) ORAL DAILY
Qty: 30 TAB | Refills: 0 | Status: SHIPPED | OUTPATIENT
Start: 2019-03-13

## 2019-03-12 RX ORDER — PRAVASTATIN SODIUM 40 MG/1
40 TABLET ORAL
Qty: 30 TAB | Refills: 0 | Status: SHIPPED | OUTPATIENT
Start: 2019-03-12 | End: 2019-03-26 | Stop reason: SDUPTHER

## 2019-03-12 RX ADMIN — ASPIRIN 81 MG 81 MG: 81 TABLET ORAL at 08:50

## 2019-03-12 RX ADMIN — Medication 10 ML: at 12:00

## 2019-03-12 RX ADMIN — RIVAROXABAN 20 MG: 20 TABLET, FILM COATED ORAL at 08:50

## 2019-03-12 RX ADMIN — PANTOPRAZOLE SODIUM 40 MG: 40 TABLET, DELAYED RELEASE ORAL at 08:50

## 2019-03-12 RX ADMIN — Medication 10 ML: at 05:09

## 2019-03-12 NOTE — PROGRESS NOTES
Problem: Falls - Risk of  Goal: *Absence of Falls  Document Romero Fall Risk and appropriate interventions in the flowsheet. Outcome: Progressing Towards Goal  Fall Risk Interventions:  Mobility Interventions: Patient to call before getting OOB         Medication Interventions: Patient to call before getting OOB, Teach patient to arise slowly         History of Falls Interventions:  Investigate reason for fall

## 2019-03-12 NOTE — PROGRESS NOTES
Patient referred to [facility] for RN CCM services. In hospital visit with patient this am. Patient resting in bed alert and oriented. RN CCM services discussed with patient. Patient in agreement for RN to outreach upon discharge. RRAT: 11- 3 Admits since 12/18/18. PMH: CVA, HTN, Malignant Neoplasm tail of pancreas  PCP: Dr. Natan Fernandez: Discharge home today with [facility] [place]. Link with RN CCM, Brigid Parisi for [facility] and follow up. This note will not be viewable in [address]

## 2019-03-12 NOTE — PROGRESS NOTES
END OF SHIFT NOTE:    Intake/Output  03/11 1901 - 03/12 0700  In: 120 [P.O.:120]  Out: -    Voiding: YES  Catheter: NO  Drain:              Stool:  0 occurrences. Emesis:  0 occurrences. VITAL SIGNS  Patient Vitals for the past 12 hrs:   Temp Pulse Resp BP SpO2   03/12/19 0000 98.3 °F (36.8 °C) 77 18 133/81 96 %       Pain Assessment  Pain 1  Pain Scale 1: Numeric (0 - 10) (03/12/19 0400)  Pain Intensity 1: 0 (03/12/19 0400)  Patient Stated Pain Goal: 0 (03/12/19 0400)  Pain Reassessment 1: Yes (03/12/19 0400)  Pain Onset 1: pta (03/11/19 0009)  Pain Location 1: Abdomen (03/11/19 4614)  Pain Orientation 1: Upper (03/11/19 5068)  Pain Description 1: Throbbing (03/11/19 2041)  Pain Intervention(s) 1: Medication (see MAR) (03/11/19 6041)    Ambulating  Yes    Additional Information: pt wanted to be left alone to rest, gave her bedtime time medications then periodically checked in on her and to replace heart monitor leads. No complaints of pain. Shift report given to oncoming nurse at the bedside.     Abelardo Asencio RN

## 2019-03-12 NOTE — PHYSICIAN ADVISORY
Letter of Determination: Upgrade from Observation to Inpatient Status    This patient was originally hospitalized as Outpatient Status with Observation Services on 3/10/2019 for progressive right sided weakness. This patient now meets for Inpatient Admission based on medical necessity. The patient's stay was medically necessary based on magnatic resonance imaging demonstrating multiple right sided frontal and parietal infarcts. It is our recommendation that this patient's hospitalization status should be upgraded from OBSERVATION to INPATIENT status.      The final decision regarding the patient's hospitalization status depends on the attending physician's judgement.     Leonie Lion MD, B[place],   Physician [place].

## 2019-03-12 NOTE — DISCHARGE SUMMARY
Discharge Summary     Patient: Sabrina Lyon MRN: [id number]  SSN: [id number]    YOB: 1960  Age: 64 y.o. Sex: female       Admit Date: 3/10/2019    Discharge Date: 3/12/2019      Admission Diagnoses: Weakness of right arm [R29.898]    Discharge Diagnoses:   Problem List as of 3/12/2019 Date Reviewed: 3/10/2019          Codes Class Noted - Resolved    Weakness of right arm ICD-10-CM: R29.898  ICD-9-CM: 729.89  3/10/2019 - Present        CINV (chemotherapy-induced nausea and vomiting) ICD-10-CM: R11.2, T45.1X5A  ICD-9-CM: 787.01, E933.1  1/17/2019 - Present        Renal infarct ([facility] ICD-10-CM: N28.0  ICD-9-CM: 593.81  1/3/2019 - Present        Hypokalemia ICD-10-CM: E87.6  ICD-9-CM: 276.8  1/3/2019 - Present        Severe obesity ([facility] ICD-10-CM: E66.01  ICD-9-CM: 278.01  12/6/2018 - Present        Malignant neoplasm of tail of pancreas ([facility] (Chronic) ICD-10-CM: C25.2  ICD-9-CM: 157.2  12/6/2018 - Present        Hypercholesterolemia ICD-10-CM: E78.00  ICD-9-CM: 272.0  Unknown - Present        Hypertension (Chronic) ICD-10-CM: I10  ICD-9-CM: 401.9  Unknown - Present        RESOLVED: Acute left-sided low back pain with left-sided sciatica ICD-10-CM: M54.42  ICD-9-CM: 724.2, 724.3  8/12/2016 - 1/3/2019               Discharge Condition: [facility] Course:   Ms Zaidi Last is Y 76 XL CF with a history of HTN, HLD, pancreatic cancer in 2018,s/p spleenectomy, gastro jejunostomy and biopsies. FOLFIRINOX Dec 2018, multiple renal infarcts on xarelto, possible related to chemotherapy side effects reason for her to discontinue therapy, currently on radiation, who was admitted with right arm weakness. She was found to have a left sided CVA. MRI showed small acute infarcts left frontal and left parietal and suspected slow flow left ica flow. Neurology was consulted, possible embolic CVA. CTA head and neck showed left ICA complete occlusion. She was started on asa, statins and continued xarelto.  The patient received PT/OT, plan to continue as outpatient. Her right arm weakness has mildly improved. Physical Examination:  Nose: Nose normal.   Mouth/Throat: No oropharyngeal exudate. Eyes: EOM are normal. No scleral icterus. Neck: No tracheal deviation present. No thyromegaly present. Cardiovascular: Normal rate and regular rhythm. Exam reveals no friction rub. Pulmonary/Chest: No respiratory distress. She has no wheezes. Abdominal: She exhibits no distension. There is no tenderness. There is no rebound. Musculoskeletal: She exhibits no tenderness or deformity. Neurological: She is alert. oriented x 3, right arm weakness 4/5, right leg strength is preserved, as well as left side. No sensory deficits   Skin: Skin is dry. She is not diaphoretic. No pallor. Psychiatric: Memory and affect normal.     Consults: Neurology, vascular surgery     Significant Diagnostic Studies: see note    Disposition: home    Discharge Medications:   Current Discharge Medication List      START taking these medications    Details   pravastatin (PRAVACHOL) 40 mg tablet Take 1 Tab by mouth nightly. Qty: 30 Tab, Refills: 0      aspirin 81 mg chewable tablet Take 1 Tab by mouth daily. Qty: 30 Tab, Refills: 0         CONTINUE these medications which have NOT CHANGED    Details   magnesium hydroxide (BARBOZA MILK OF MAGNESIA) 400 mg/5 mL suspension Take 45 mL by mouth nightly. oxyCODONE IR (ROXICODONE) 10 mg tab immediate release tablet Take 1 Tab by mouth every six (6) hours as needed for Pain for up to 30 days. Max Daily Amount: 40 mg.  Qty: 90 Tab, Refills: 0    Associated Diagnoses: Malignant neoplasm of tail of pancreas (HCC)      fentaNYL (DURAGESIC) 12 mcg/hr patch 1 Patch by TransDERmal route every seventy-two (72) hours for 30 days. Max Daily Amount: 1 Patch.   Qty: 10 Patch, Refills: 0    Associated Diagnoses: Malignant neoplasm of tail of pancreas (HCC)      LORazepam (ATIVAN) 2 mg tablet Take 1 Tab by mouth every six (6) hours as needed for Anxiety. Max Daily Amount: 8 mg. Qty: 60 Tab, Refills: 1    Associated Diagnoses: Malignant neoplasm of tail of pancreas (HCC)      fish oil-omega-3 fatty acids 300-500 mg cap Take 1 Tab by mouth daily. rivaroxaban (XARELTO) 20 mg tab tablet Take 1 Tab by mouth daily. Qty: 30 Tab, Refills: 5      pantoprazole (PROTONIX) 40 mg tablet Take 1 Tab by mouth Before breakfast and dinner. Qty: 30 Tab, Refills: 2      ondansetron hcl (ZOFRAN) 8 mg tablet TAKE 1 TABLET BY MOUTH EVERY 8 HOURS AS NEEDED FOR NAUSEA  Refills: 2      lidocaine-prilocaine (EMLA) topical cream Apply  to affected area as needed for Pain. Apply to port site 45-60 minutes prior to lab appointment or infusion appointment. Qty: 30 g, Refills: 2      promethazine (PHENERGAN) 25 mg tablet Take 1 Tab by mouth every six (6) hours as needed for Nausea (and vomiting). Qty: 90 Tab, Refills: 2      capecitabine (XELODA) 500 mg tablet Take 2 ( 500mg) tabs twice a day until radiation complete including weekends  Qty: 148 Tab, Refills: 1    Comments: Take 2 ( 500mg) tabs twice a day until radiation complete including weekends  Associated Diagnoses: Malignant neoplasm of tail of pancreas (HCC)      nystatin (MYCOSTATIN) 100,000 unit/mL suspension Take  by mouth four (4) times daily. swish and spit      lipase-protease-amylase (CREON) 36,000-114,000- 180,000 unit cpDR capsule Take 2 capsules with each meal and 1 with snacks. Qty: 126 Cap, Refills: 0    Associated Diagnoses: Pancreatic insufficiency             Activity: Activity as tolerated  Diet: Regular Diet  Wound Care: None needed    Follow-up Appointments   Procedures    FOLLOW UP VISIT Appointment in: One Week pcp     pcp     Standing Status:   Standing     Number of Occurrences:   1     Order Specific Question:   Appointment in     Answer:    One Week    FOLLOW UP VISIT Appointment in: One Month Neurology     Neurology     Standing Status:   Standing     Number of Occurrences:   1     Standing Expiration Date:   3/13/2019     Order Specific Question:   Appointment in     Answer:   One Month       Signed By: Francesca Gutierrez MD     March 12, 2019

## 2019-03-12 NOTE — DISCHARGE INSTRUCTIONS
DISCHARGE SUMMARY from Nurse    PATIENT INSTRUCTIONS:    After general anesthesia or intravenous sedation, for 24 hours or while taking prescription Narcotics:  · Limit your activities  · Do not drive and operate hazardous machinery  · Do not make important personal or business decisions  · Do  not drink alcoholic beverages  · If you have not urinated within 8 hours after discharge, please contact your surgeon on call. Report the following to your surgeon:  · Excessive pain, swelling, redness or odor of or around the surgical area  · Temperature over 100.5  · Nausea and vomiting lasting longer than 4 hours or if unable to take medications  · Any signs of decreased circulation or nerve impairment to extremity: change in color, persistent  numbness, tingling, coldness or increase pain  · Any questions    What to do at Home:  Recommended activity: Activity as directed by your MD.    If you experience any of the following symptoms: increased weakness, difficulty with speech, bleeding or dizziness please follow up with your MD.    *  Please give a list of your current medications to your Primary Care Provider. *  Please update this list whenever your medications are discontinued, doses are      changed, or new medications (including over-the-counter products) are added. *  Please carry medication information at all times in case of emergency situations. These are general instructions for a healthy lifestyle:    No smoking/ No tobacco products/ Avoid exposure to second hand smoke  Surgeon General's Warning:  Quitting smoking now greatly reduces serious risk to your health.     Obesity, smoking, and sedentary lifestyle greatly increases your risk for illness    A healthy diet, regular physical exercise & weight monitoring are important for maintaining a healthy lifestyle    You may be retaining fluid if you have a history of heart failure or if you experience any of the following symptoms:  Weight gain of 3 pounds or more overnight or 5 pounds in a week, increased swelling in our hands or feet or shortness of breath while lying flat in bed. Please call your doctor as soon as you notice any of these symptoms; do not wait until your next office visit. Recognize signs and symptoms of STROKE:    F-face looks uneven    A-arms unable to move or move unevenly    S-speech slurred or non-existent    T-time-call 911 as soon as signs and symptoms begin-DO NOT go       Back to bed or wait to see if you get better-TIME IS BRAIN. Warning Signs of HEART ATTACK     Call 911 if you have these symptoms:   Chest discomfort. Most heart attacks involve discomfort in the center of the chest that lasts more than a few minutes, or that goes away and comes back. It can feel like uncomfortable pressure, squeezing, fullness, or pain.  Discomfort in other areas of the upper body. Symptoms can include pain or discomfort in one or both arms, the back, neck, jaw, or stomach.  Shortness of breath with or without chest discomfort.  Other signs may include breaking out in a cold sweat, nausea, or lightheadedness. Don't wait more than five minutes to call 911 - MINUTES MATTER! Fast action can save your life. Calling 911 is almost always the fastest way to get lifesaving treatment. Emergency Medical Services staff can begin treatment when they arrive -- up to an hour sooner than if someone gets to the hospital by car. The discharge information has been reviewed with the patient. The patient verbalized understanding. Discharge medications reviewed with the patient and appropriate educational materials and side effects teaching were provided. ___________________________________________________________________________________________________________________________________  Stroke: After Your Visit     Your Care Instructions     You have had a stroke. Risk factors for stroke include being overweight, smoking, and sedentary lifestyle. This means that the blood flow to a part of your brain was blocked for some time, which damages the nerve cells in that part of the brain. The part of your body controlled by that part of your brain may not function properly now. The brain is an amazing organ that can heal itself to some degree. The stroke you had damaged part of your brain, but other parts of your brain may take over in some way for the damaged areas. You have already started this process. Going home may be hard for you and your family. The more you can try to do for yourself, the better. Remember to take each day one at a time. Follow-up care is a key part of your treatment and safety. Be sure to make and go to all appointments, and call your doctor if you are having problems. Its also a good idea to know your test results and keep a list of the medicines you take. How can you care for yourself at home? Enter a stroke rehabilitation (rehab) program, if your doctor recommends it. Physical, speech, and occupational therapies can help you manage bathing, dressing, eating, and other basics of daily living. Eat a heart-healthy diet that is low in cholesterol, saturated fat, and salt. Eat lots of fresh fruits and vegetables and foods high in fiber. Increase your activities slowly. Take short rest breaks when you get tired. Gradually increase the amount you walk. Start out by walking a little more than you did the day before. Do not drive until your doctor says it is okay. It is normal to feel sad or depressed after a stroke. If the blues last, talk to your doctor. If you are having problems with urine leakage, go to the bathroom at regular times, including when you first wake up and at bedtime. Also, limit fluids after dinner. If you are constipated, drink plenty of fluids, enough so that your urine is light yellow or clear like water.  If you have kidney, heart, or liver disease and have to limit fluids, talk with your doctor before you increase the amount of fluids you drink. Set up a regular time for using the toilet. If you continue to have constipation, your doctor may suggest using a bulking agent, such as Metamucil, or a stool softener, laxative, or enema. Medicines  Take your medicines exactly as prescribed. Call your doctor if you think you are having a problem with your medicine. You may be taking several medicines. ACE (angiotensin-converting enzyme) inhibitors, angiotensin II receptor blockers (ARBs), beta-blockers, diuretics (water pills), and calcium channel blockers control your blood pressure. Statins help lower cholesterol. Your doctor may also prescribe medicines for depression, pain, sleep problems, anxiety, or agitation. If your doctor has given you medicine that prevents blood clots, such as warfarin (Coumadin), aspirin combined with extended-release dipyridamole (Aggrenox), clopidogrel (Plavix), or aspirin to prevent another stroke, you should:  Tell your dentist, pharmacist, and other health professionals that you take these medicines. Watch for unusual bruising or bleeding, such as blood in your urine, red or black stools, or bleeding from your nose or gums. Get regular blood tests to check your clotting time if you are taking Coumadin. Wear medical alert jewelry that says you take blood thinners. You can buy this at most drugstores. Do not take any over-the-counter medicines or herbal products without talking to your doctor first.  If you take birth control pills or hormone replacement therapy, talk to your doctor about whether they are right for you. For family members and caregivers  Make the home safe. Set up a room so that your loved one does not have to climb stairs. Be sure the bathroom is on the same floor. Move throw rugs and furniture that could cause falls, and make sure that the lighting is good. Put grab bars and seats in tubs and showers.   Find out what your loved one can do and what he or she needs help with. Try not to do things for your loved one that your loved one can do on his or her own. Help him or her learn and practice new skills. Visit and talk with your loved one often. Try doing activities together that you both enjoy, such as playing cards or board games. Keep in touch with your loved one's friends as much as you can, and encourage them to visit. Take care of yourself. Do not try to do everything yourself. Ask other family members to help. Eat well, get enough rest, and take time to do things that you enjoy. Keep up with your own doctor visits, and make sure to take your medicines regularly. Get out of the house as much as you can. Join a local support group. Find out if you qualify for home health care visits to help with rehab or for adult day care. When should you call for help? Call 911 anytime you think you may need emergency care. For example, call if:  You have signs of another stroke. These may include:  Sudden numbness, paralysis, or weakness in your face, arm, or leg, especially on only one side of your body. New problems with walking or balance. Sudden vision changes. Drooling or slurred speech. New problems speaking or understanding simple statements, or you feel confused. A sudden, severe headache that is different from past headaches. Call 911 even if these symptoms go away in a few minutes. You cough up blood. You vomit blood or what looks like coffee grounds. You pass maroon or very bloody stools. Call your doctor now or seek immediate medical care if:  You have new bruises or blood spots under your skin. You have a nosebleed. Your gums bleed when you brush your teeth. You have blood in your urine. Your stools are black and tarlike or have streaks of blood. You have vaginal bleeding when you are not having your period, or heavy period bleeding.   You have new symptoms that may be related to your stroke, such as falls or trouble swallowing. Watch closely for changes in your health, and be sure to contact your doctor if you have any problems. Where can you learn more? Go to [employer]    Enter C294  in the search box to learn more about \"Stroke: After Your Visit\". © 0385-2929 [employer]wise, [employer]orporated. Care instructions adapted under license by [facility] (which disclaims liability or warranty for this information). This care instruction is for use with your licensed healthcare professional. If you have questions about a medical condition or this instruction, always ask your healthcare professional. [employer] any warranty or liability for your use of this information.

## 2019-03-12 NOTE — PROGRESS NOTES
Pt is being discharged today. Milestones Met    Care Management Interventions  PCP Verified by CM:  Yes  Transition of Care Consult (CM Consult): Discharge Planning, [address]: No  Reason Outside [place]: Patient already serviced by other home care/hospice agency([facility])  Discharge Durable Medical Equipment: Yes(Rolling walker, 3n1 commode)  Physical Therapy Consult: Yes  Occupational Therapy Consult: Yes  Speech Therapy Consult: No  Current Support Network: Own Home  Confirm Follow Up Transport: Family  Plan discussed with Pt/Family/Caregiver: Yes  Freedom of Choice Offered: Yes  [address] Provided?: No  Discharge Location  Discharge Placement: Home with home health

## 2019-03-12 NOTE — PROGRESS NOTES
0700[facility] Resting in bed. No needs voiced. No s/s of acute distress.

## 2019-03-12 NOTE — PROGRESS NOTES
Dysphagia Goals:  LTG: Patient will tolerate least restrictive diet without signs/symptoms of aspiration at discharge for safe swallow function. Goal met 3/12/19  STG: Pt will tolerate regular diet/thin liquids (single sip) without overt s/sx airway compromise. Goal met 3/12/19  STG: Patient will participate in modified barium swallow study to objectively assess swallow function. Goal discontinued 3/12/19     Cognitive Linguistic Goals:  STG: Patient will participate in full cognitive linguistic. Goal met 3/12/19  STG: Patient will utilize compensatory strategies to recall items with delay with 80% accuracy given minimal cueing  STG: Patient will complete divergent naming tasks with 75% accuracy given moderate cueing  STG: Patient will solve simple calculations with 80% accuracy given minimal cueing   STG: Patient will complete reasoning/problem solving tasks with 80% accuracy given moderate cueing   LTG: Patient will increase cognitive and linguistic skills across environments to improve communication and safety awareness for safe return home. Goal added 3/12/19    Speech language pathology: bedside swallow and speech language note: Initial Assessment and Daily Note    NAME/AGE/GENDER: Ann Marie Sweet is a 64 y.o. female  DATE: 3/12/2019  PRIMARY DIAGNOSIS: Weakness of right arm [R29.898]  CVA (cerebral vascular accident) ([facility] [address] [I63.9]      ICD-10: Treatment Diagnosis: pharyngeal dysphagia R13.13, Cognitive Communication deficit R41.841  INTERDISCIPLINARY COLLABORATION: Registered NurseASSESSMENT:Seen for diet tolerance and cognitive linguistic evaluation. Patient will benefit from skilled intervention to address the below impairments. Swallowing Results: No overt s/sx of airway compromise with thin liquid via cup and straw, puree, mixed, and solid consistencies. Appropriate oral prep with all textures. Timely swallow initiation, and single swallows upon palpation. Adequate oral clearing.  Recommend regular diet/thin liquids. Medications as tolerated. Educated patient on safe swallowing guidelines. No further dysphagia treatment indicated at this time. Speech Language-Cognitive Results: [place] [facility]) completed with total score of 20/25. Score adjusted to reflect omitted visuospatial/executive function tasks due to patient's right sided weakness. Normal>or=26/30     Naming: 3/3    Memory: Immediate-5/5, Delayed-3/5 independently, 4/5 multiple choice presentation    Attention: Digit manipulation-2/2, Sustained attention-1/1, Serial subtraction-2/3    Language: Sentence repetition-2/2, Fluency-0/1 (named 3 words to concrete category in 1 minute, N=11)    Abstraction: 2 item similarity- 1/2    Orientation: 6/6  Overall, cognitive communication deficits in thought organization, verbal reasoning, attention, and delayed recall. Patient very tearful during this evaluation. She is currently functioning below baseline and will benefit from ongoing skilled intervention upon discharge. ?????? ? ? This section established at most recent assessment??????????  PROBLEM LIST (Impairments causing functional limitations):  1. oropharyngeal dysphagia- no symptoms identified  2. cognition  REHABILITATION POTENTIAL FOR STATED GOALS: Good  PLAN OF CARE:   Patient will benefit from skilled intervention to address the following impairments. RECOMMENDATIONS AND PLANNED INTERVENTIONS (Benefits and precautions of therapy have been discussed with the patient.):  · continue prescribed diet  MEDICATIONS:  · With liquid  COMPENSATORY STRATEGIES/MODIFICATIONS INCLUDING:  · Small sips and bites  OTHER RECOMMENDATIONS (including follow up treatment recommendations):   · Patient education  · cognitive treatment  RECOMMENDED DIET MODIFICATIONS DISCUSSED WITH:  · Patient  FREQUENCY/DURATION: Continue to follow patient 2 times a week for duration of hospital stay to address above goals. RECOMMENDED REHABILITATION/EQUIPMENT: (at time of discharge pending progress): Due to the probability of continued deficits (see above) this patient will likely need continued skilled speech therapy after discharge. SUBJECTIVE:   Oriented x4. Very tearful during evaluation. History of Present Injury/Illness: Ms. Tiara Ernandez  has a past medical history of Cancer ([address] (Dx 11/2018), Hypercholesterolemia, Hypertension, and Pancreatic mass. .  She also  has a past surgical history that includes hx heent; hx gyn; hx cholecystectomy; and pr abdomen surgery proc unlisted. Present Symptoms:    Pain Intensity 1: 0  Pain Location 1: Abdomen  Pain Orientation 1: Upper  Pain Intervention(s) 1: Medication (see MAR)  Current Medications:   No current facility-administered medications on file prior to encounter. Current Outpatient Medications on File Prior to Encounter   Medication Sig Dispense Refill    magnesium hydroxide (BARBOZA MILK OF MAGNESIA) 400 mg/5 mL suspension Take 45 mL by mouth nightly.  oxyCODONE IR (ROXICODONE) 10 mg tab immediate release tablet Take 1 Tab by mouth every six (6) hours as needed for Pain for up to 30 days. Max Daily Amount: 40 mg. 90 Tab 0    fentaNYL (DURAGESIC) 12 mcg/hr patch 1 Patch by TransDERmal route every seventy-two (72) hours for 30 days. Max Daily Amount: 1 Patch. 10 Patch 0    LORazepam (ATIVAN) 2 mg tablet Take 1 Tab by mouth every six (6) hours as needed for Anxiety. Max Daily Amount: 8 mg. 60 Tab 1    fish oil-omega-3 fatty acids 300-500 mg cap Take 1 Tab by mouth daily.  rivaroxaban (XARELTO) 20 mg tab tablet Take 1 Tab by mouth daily. 30 Tab 5    pantoprazole (PROTONIX) 40 mg tablet Take 1 Tab by mouth Before breakfast and dinner. 30 Tab 2    ondansetron hcl (ZOFRAN) 8 mg tablet TAKE 1 TABLET BY MOUTH EVERY 8 HOURS AS NEEDED FOR NAUSEA  2    lidocaine-prilocaine (EMLA) topical cream Apply  to affected area as needed for Pain.  Apply to port site 45-60 minutes prior to lab appointment or infusion appointment. 30 g 2    promethazine (PHENERGAN) 25 mg tablet Take 1 Tab by mouth every six (6) hours as needed for Nausea (and vomiting). 90 Tab 2    capecitabine (XELODA) 500 mg tablet Take 2 ( 500mg) tabs twice a day until radiation complete including weekends 148 Tab 1    nystatin (MYCOSTATIN) 100,000 unit/mL suspension Take  by mouth four (4) times daily. swish and spit      lipase-protease-amylase (CREON) 36,000-114,000- 180,000 unit cpDR capsule Take 2 capsules with each meal and 1 with snacks. 126 Cap 0     Current Dietary Status:  Regular/Thin         Social History/Home Situation:    Home Environment: Private residence  # Steps to Enter: 5  Rails to Enter: Yes  Hand Rails : Bilateral  One/Two Story Residence: Two story  Living Alone: No  Support Systems: Spouse/Significant Other/Partner  Patient Expects to be Discharged to[de-identified] Private residence  Current DME Used/Available at Home: None  Tub or Shower Type: Shower  OBJECTIVE:   Respiratory Status:  Room air     MRI/CT Results: Left internal carotid artery complete occlusion. Mild narrowing in the M1 portion of the left middle cerebral artery. Right internal carotid artery: 15-20% narrowing in the carotid bulb. Patent bilateral vertebral arteries. .  Oral Motor Structure/Speech:  Oral-Motor Structure/Motor Speech  Labial: No impairment  Dentition: Intact  Oral Hygiene: adequate  Lingual: No impairment    Cognitive and Communication Status:  Neurologic State: Alert  Orientation Level: Oriented X4  Cognition: Decreased attention/concentration; Follows commands        Safety/Judgement: Awareness of environment    BEDSIDE SWALLOW EVALUATION  Oral Assessment:  Oral Assessment  Labial: No impairment  Dentition: Intact  Oral Hygiene: adequate  Lingual: No impairment  P.O. Trials:  Patient Position: upright in bed    The patient was given  the following:   Consistency Presented: Thin liquid;Puree;Mixed consistency; Solid  How Presented: Self-fed/presented;Cup/sip;Spoon; Successive swallows;Straw;Cup/gulp    ORAL PHASE:  Bolus Acceptance: No impairment  Bolus Formation/Control: No impairment  Propulsion: No impairment     Oral Residue: Less than 10% of bolus(cleared independently)    PHARYNGEAL PHASE:  Initiation of Swallow: No impairment  Laryngeal Elevation: Functional  Aspiration Signs/Symptoms: None  Vocal Quality: No impairment                OTHER OBSERVATIONS:  Rate/bite size: WNL  Comments:   Endurance: WNL  Comments:     SPEECH-LANGUAGE COGNITIVE EVALUATION  Tests Given:jonah cognitive assessment     Motor Speech:  Oral-Motor Structure/Motor Speech  Labial: No impairment  Dentition: Intact  Oral Hygiene: adequate  Lingual: No impairment    Auditory Comprehension:   Auditory Comprehension  Auditory Impairment: No     Reading Comprehension:        Verbal Expression:   Verbal Expression  Repetition: No impairment  Naming: No impairment    Written Expression:        Neuro-Linguistics:   Memory:   Immediate: WFL   Delayed: Impaired, 3/5  Attention:   Serial subtraction: Impaired, 2/3  Abstract reasonin item similarities: Impaired 1/2  Verbal Organization   Fluency: Divergent naming: Impaired, 3 items in 1 minute (normal>11)                         Pragmatics:          Voice:                 Vocal Quality: No impairment                               Dysphagia Activities: Activities/Procedures listed utilized to improve progress in swallow strength, swallow timeliness, swallow function, diet tolerance and swallow safety. Required no cueing to improve swallow safety, work toward diet advancement and decrease aspiration risk.   Assessment/Reassessment only, no treatment provided today    Tool Used: Dysphagia Outcome and Severity Scale (ELIOT)    Score Comments   Normal Diet  [] 7 With no strategies or extra time needed       Functional Swallow  [] 6 May have mild oral or pharyngeal delay       Mild Dysphagia    [x] 5 Which may require one diet consistency restricted (those who demonstrate penetration which is entirely cleared on MBS would be included)   Mild-Moderate Dysphagia  [] 4 With 1-2 diet consistencies restricted       Moderate Dysphagia  [] 3 With 2 or more diet consistencies restricted       Moderately Severe Dysphagia  [] 2 With partial PO strategies (trials with ST only)       Severe Dysphagia  [] 1 With inability to tolerate any PO safely          Score:  Initial: 5 Most Recent: 7 (Date: 3/12/19 )   Interpretation of Tool: The Dysphagia Outcome and Severity Scale (ELIOT) is a simple, easy-to-use, 7-point scale developed to systematically rate the functional severity of dysphagia based on objective assessment and make recommendations for diet level, independence level, and type of nutrition. Score 7 6 5 4 3 2 1   Modifier CH CI CJ CK CL CM CN     Payor: [facility] / Plan: [employer] / Product Type: HMO /     ________________________________________________________________________________________________    Safety:   After treatment position/precautions:  · Call light within reach  · Upright in Bed  Treatment Assessment:  Participated in diet tolerance and cognitive evaluation without complications. Progression/Medical Necessity:   · no further dysphagia treatment indicated  · Patient is expected to demonstrate progress in expressive communication and cognitive ability to increase independence with activities of daily living and increase communication with family/caregivers. Compliance with Program/Exercises: Will assess as treatment progresses   Reason for Continuation of Services/Other Comments:  · Patient continues to require skilled intervention due to medical complications. Recommendations/Intent for next treatment session: \"Treatment next visit will focus on advancements to more challenging activities and reduction in assistance provided\".     Total Treatment Duration:  Time In: 5099  Time Out: [address] CF-SLP

## 2019-03-12 NOTE — PROGRESS NOTES
[facility] [place] - INPATIENT  Face to Face Encounter    Patients Name: Isra Modi    YOB: 1960    Ordering Physician: Dr. Amparo Downign  Primary Diagnosis: Weakness of right arm [R29.898]  CVA (cerebral vascular accident) [facility]) [I63.9]    Date of Face to Face:   3/12/2019                                  Face to Face Encounter findings are related to primary reason for home care:   yes. 1. I certify that the patient needs intermittent care as follows: skilled nursing care:  skilled observation/assessment, patient education, complex care plan management, administration of medications and rehabilitative nursing  physical therapy: strengthening, stretching/ROM, transfer training, gait/stair training, balance training and pt/caregiver education  occupational therapy:  ADL safety (ie. cooking, bathing, dressing), ROM and pt/caregiver education    2. I certify that this patient is homebound, that is: 1) patient requires the use of a walker device, special transportation, or assistance of another to leave the home; or 2) patient's condition makes leaving the home medically contraindicated; and 3) patient has a normal inability to leave the home and leaving the home requires considerable and taxing effort. Patient may leave the home for infrequent and short duration for medical reasons, and occasional absences for non-medical reasons. Homebound status is due to the following functional limitations: Patient with visual deficits increasing risk for falls with all ambulation outside of the home. Patient requires the assistance of others when leaving the home. 3. I certify that this patient is under my care and that I, or a nurse practitioner or [address] [place] [address], or clinical nurse specialist, or certified nurse midwife, working with me, had a Face-to-Face Encounter that meets the physician Face-to-Face Encounter requirements.   The following are the clinical findings from the [address] encounter that support the need for skilled services and is a summary of the encounter: Progress Note. See attached progess note      Izabella Arrington  3/12/2019      THE FOLLOWING TO BE COMPLETED BY THE COMMUNITY PHYSICIAN:    I concur with the findings described above from the F2F encounter that this patient is homebound and in need of a skilled service.     Certifying Physician: _____________________________________      Printed Certifying Physician Name: _____________________________________    Date: _________________

## 2019-03-12 NOTE — PROGRESS NOTES
Problem: Mobility Impaired (Adult and Pediatric)  Goal: *Acute Goals and Plan of Care (Insert Text)  No goals. Ambulating with supervision. States she is going home. No concerns. PHYSICAL THERAPY: Initial Assessment and Discharge 3/12/2019  INPATIENT:    Payor: Jayla Bagley / Plan: [address]MO / Product Type: HMO /       NAME/AGE/GENDER: Deloris Colon is a 64 y.o. female   PRIMARY DIAGNOSIS: Weakness of right arm [R29.898]  CVA (cerebral vascular accident) ([facility] [address] [I63.9] <principal problem not specified> <principal problem not specified>       ICD-10: Treatment Diagnosis:    · Generalized Muscle Weakness (M62.81)  · Difficulty in walking, Not elsewhere classified (R26.2)   Precaution/Allergies:  Penicillins      ASSESSMENT:     Ms. Mark Silverio presents with good bed mobility and transfers. Her gait is slow with a wide base but no loss of balance. Her right UE was affected by the CVA and OT has assessed that. She tells me she is going home today and that she has no concerns from a functional standpoint. The only equipment she is asking for is a 3-1 commode which CM already is working on. There are no other PT needs identified. This section established at most recent assessment   PROBLEM LIST (Impairments causing functional limitations): 1. none   INTERVENTIONS PLANNED: (Benefits and precautions of physical therapy have been discussed with the patient.)  1. none     TREATMENT PLAN: Frequency/Duration: none  Rehabilitation Potential For Stated Goals: NA     RECOMMENDED REHABILITATION/EQUIPMENT: (at time of discharge pending progress): Due to the probability of continued deficits (see above) this patient will not likely need continued skilled physical therapy after discharge.   Equipment:    None at this time              HISTORY:   History of Present Injury/Illness (Reason for Referral):  Patient is a 63 yo CF with a history of HTN, HLD, pancreatic cancer who presented to the ED with c/o right sided weakness. Patient states she noticed decreased dexterity in her right hand last week. Deemed as neuropathy by oncology. Since then has developed into right hand, arm and right leg weakness, ataxia, decreased coordination in right arm. Denies facial asymmetry, aphasia, limited ROM, syncope, dizziness, chest pain, abd pain, n/v/d, seizures, loss of bowel or bladder. ED provider has asked our hospitalist service to evaluate and further manage the patient.     Patient was diagnosed with pancreatic cancer in November 2018. She underwent s/p spleenectomy, gastro jejunostomy and biopsies. FOLFIRINOX Dec 2018 halted with admission for acute right-sided flank pain found to be multiple right renal infarcts. Anticoagulated on xarelto. She had made a decision that the toxicity of her FOLFIRINOX was more than she wanted to endure and ultimately chose to discontinue her chemotherapy. PET scan in feb 2019 showed her tumor localized to her pancreas. Plan was to start radiation therapy concurrent with radiation sensitizing chemo treatment. Past Medical History/Comorbidities:   Ms. Crissy Mckenzie  has a past medical history of Cancer ([facility] [address] (Dx 11/2018), Hypercholesterolemia, Hypertension, and Pancreatic mass. Ms. Crissy Mckenzie  has a past surgical history that includes hx heent; hx gyn; hx cholecystectomy; and pr abdomen surgery proc unlisted.   Social History/Living Environment:   Home Environment: Private residence  # Steps to Enter: 5  Rails to Enter: Yes  Hand Rails : Bilateral  One/Two Story Residence: Two story  Living Alone: No  Support Systems: Spouse/Significant Other/Partner  Patient Expects to be Discharged to[de-identified] Private residence  Current DME Used/Available at Home: None  Tub or Shower Type: Shower  Prior Level of Function/Work/Activity:  independent  pancreatic ca, CVA   Number of Personal Factors/Comorbidities that affect the Plan of Care: 1-2: MODERATE COMPLEXITY   EXAMINATION:   Most Recent Physical Functioning:   Gross Assessment:  AROM: Within functional limits  PROM: Within functional limits  Strength: Generally decreased, functional  Coordination: Within functional limits  Sensation: Intact               Posture:  Posture (WDL): Within defined limits  Balance:  Sitting: Intact  Standing - Static: Good  Standing - Dynamic : Good Bed Mobility:  Rolling: Independent  Supine to Sit: Independent  Wheelchair Mobility:     Transfers:  Sit to Stand: Stand-by assistance  Stand to Sit: Stand-by assistance  Gait:     Base of Support: Widened  Step Length: Right shortened;Left shortened  Distance (ft): 25 Feet (ft)(around room)  Ambulation - Level of Assistance: Stand-by assistance      Body Structures Involved:  1. None Body Functions Affected:  1. None Activities and Participation Affected:  1. None   Number of elements that affect the Plan of Care: 1-2: LOW COMPLEXITY   CLINICAL PRESENTATION:   Presentation: Stable and uncomplicated: LOW COMPLEXITY   CLINICAL DECISION MAKING:   [employer] AM-PAC 6 Clicks   Basic Mobility Inpatient Short Form  How much difficulty does the patient currently have. .. Unable A Lot A Little None   1. Turning over in bed (including adjusting bedclothes, sheets and blankets)? [] 1   [] 2   [] 3   [x] 4   2. Sitting down on and standing up from a chair with arms ( e.g., wheelchair, bedside commode, etc.)   [] 1   [] 2   [] 3   [x] 4   3. Moving from lying on back to sitting on the side of the bed? [] 1   [] 2   [] 3   [x] 4   How much help from another person does the patient currently need. .. Total A Lot A Little None   4. Moving to and from a bed to a chair (including a wheelchair)? [] 1   [] 2   [] 3   [x] 4   5. Need to walk in hospital room? [] 1   [] 2   [] 3   [x] 4   6. Climbing 3-5 steps with a railing? [] 1   [] 2   [x] 3   [] 4   © 2007, Trustees of [facility] [employer], under license to [employer].  All rights reserved      Score:  Initial: 23 Most Recent: X (Date: -- )    Interpretation of Tool:  Represents activities that are increasingly more difficult (i.e. Bed mobility, Transfers, Gait). Medical Necessity:     · NA. Reason for Services/Other Comments:  · NA. Use of outcome tool(s) and clinical judgement create a POC that gives a: Clear prediction of patient's progress: LOW COMPLEXITY            TREATMENT:   (In addition to Assessment/Re-Assessment sessions the following treatments were rendered)   Pre-treatment Symptoms/Complaints:  none  Pain: Initial:   Pain Intensity 1: 0  Post Session:  none     Assessment/Reassessment only, no treatment provided today    Braces/Orthotics/Lines/Etc:   · O2 Device: Room air  Treatment/Session Assessment:    · Response to Treatment:  good  · Interdisciplinary Collaboration:   o Registered Nurse  · After treatment position/precautions:   o Up in chair  o Call light within reach  o RN notified   · Compliance with Program/Exercises: Will assess as treatment progresses  · Recommendations/Intent for next treatment session: \"Next visit will focus on advancements to more challenging activities and reduction in assistance provided\".   Total Treatment Duration:  PT Patient Time In/Time Out  Time In: 0905  Time Out: [address], PT

## 2019-03-12 NOTE — PROGRESS NOTES
KASEY received call from [facility] They do not service [facility]. SW called [address] [place] [address]. They do service [employer]. SW faxed clinicals. SW will follow up.

## 2019-03-12 NOTE — PROGRESS NOTES
KASEY met with Dr. Keri Pride this am. Pt will be discharged home today. KASEY will arrange for Home health with [facility], a 3in 1 commode and a walker. KASEY will set up DME with [address] KASEY will continue to monitor and follow up.

## 2019-03-13 ENCOUNTER — HOSPITAL ENCOUNTER (OUTPATIENT)
Dept: RADIATION ONCOLOGY | Age: 59
Discharge: HOME | End: 2019-03-13
Payer: COMMERCIAL

## 2019-03-13 ENCOUNTER — PATIENT OUTREACH (OUTPATIENT)
Dept: CASE MANAGEMENT | Age: 59
End: 2019-03-13

## 2019-03-13 PROCEDURE — 77301 RADIOTHERAPY DOSE PLAN IMRT: CPT

## 2019-03-13 PROCEDURE — 77293 RESPIRATOR MOTION MGMT SIMUL: CPT

## 2019-03-13 PROCEDURE — 77300 RADIATION THERAPY DOSE PLAN: CPT

## 2019-03-13 PROCEDURE — 77399 UNLISTED PX MED RADJ PHYSICS: CPT

## 2019-03-13 NOTE — PROGRESS NOTES
Transition of Care Discharge Follow-up Questionnaire   Date/Time of Call:   3/13/19 2pm   What was the patient hospitalized for? CVA   Does the patient understand his/her diagnosis and/or treatment and what happened during the hospitalization? Yes   Did the patient receive discharge instructions? yes   Review any discharge instructions (see notes in [facility]). Ask patient if they understand these. Do they have any questions? No questions, reviewed all instructions with patient   Were home services ordered (nursing, PT, OT, ST, etc.)? Yes - Carmencita TANNER RN came today, PT/OT later this week   If so, has the first visit occurred? If not, why? (Assist with coordination of services if necessary.) Yes today   Was any DME ordered? Yes, already has this RW   If so, has it been received? If not, why?  (Assist with coordination of arranging DME orders if necessary.) Yes   Complete a review of all medications (new, continued and discontinued meds per the D/C instructions and medication tab in [facility]). Reviewed with patient   Were all new prescriptions filled? If not, why?  (Assist with obtainment of medications if necessary.) yes     Does the patient understand the purpose and dosing instructions for all medications? (If patient has questions, provide explanation and education.) Yes   Does the patient have any problems in performing ADLs? (If patient is unable to perform ADLs  what is the limiting factor(s)? Do they have a support system that can assist? If no support system is present, discuss possible assistance that they may be able to obtain.) Patient has some struggles with showers, dressing, house work, laundry, driving but has family support and [address] along with Oncology    Does the patient have all follow-up appointments scheduled? Has transportation been arranged?   [facility] Pulmonary follow-up should be within 7 days of discharge; all others should have PCP follow-up within 7 days of discharge; follow-ups with other specialists as appropriate or ordered.) Yes, has a person to take her. All follow up appointments have been made    Any other questions or concerns expressed by the patient? Patient expresses her wishes to not take chemo or radiation and I did encourage her to speak with her treating doctor about this decision and she agreed   Schedule next appointment with SUSHANT GREGORIO Coordinator or refer to RN Case Manager/  as appropriate.  1 week    TAM Call Completed By: Forest Jeter

## 2019-03-14 ENCOUNTER — HOSPITAL ENCOUNTER (OUTPATIENT)
Dept: RADIATION ONCOLOGY | Age: 59
Discharge: HOME | End: 2019-03-14
Payer: COMMERCIAL

## 2019-03-14 PROCEDURE — 77338 DESIGN MLC DEVICE FOR IMRT: CPT

## 2019-03-15 ENCOUNTER — PATIENT OUTREACH (OUTPATIENT)
Dept: CASE MANAGEMENT | Age: 59
End: 2019-03-15

## 2019-03-15 NOTE — PROGRESS NOTES
Spoke with patient today and she stated due to recent stroke, she has decided not to have radiation or Xeloda therapy. Email sent to add palliative care to schedule with Dr Gerardo Foley on 3-26-19 and cancel and further radiation plan or Xeloda therapy. Pt is currently receiving Home Health.

## 2019-03-20 ENCOUNTER — PATIENT OUTREACH (OUTPATIENT)
Dept: CASE MANAGEMENT | Age: 59
End: 2019-03-20

## 2019-03-20 NOTE — PROGRESS NOTES
TAM follow up call to Ms. Jesus, no answer, left a message and will try back again later. Reviewed chart. This note will not be viewable in [address]

## 2019-03-26 ENCOUNTER — HOSPITAL ENCOUNTER (OUTPATIENT)
Dept: LAB | Age: 59
Discharge: HOME | End: 2019-03-26
Payer: COMMERCIAL

## 2019-03-26 ENCOUNTER — PATIENT OUTREACH (OUTPATIENT)
Dept: CASE MANAGEMENT | Age: 59
End: 2019-03-26

## 2019-03-26 DIAGNOSIS — C25.2 MALIGNANT NEOPLASM OF TAIL OF PANCREAS (HCC): Chronic | ICD-10-CM

## 2019-03-26 LAB
ALBUMIN SERPL-MCNC: 2.7 G/DL (ref 3.5–5)
ALBUMIN/GLOB SERPL: 0.6 {RATIO}
ALP SERPL-CCNC: 112 U/L (ref 50–136)
ALT SERPL-CCNC: 10 U/L (ref 12–65)
ANION GAP SERPL CALC-SCNC: 8 MMOL/L
AST SERPL-CCNC: 11 U/L (ref 15–37)
BASOPHILS # BLD: 0.1 K/UL (ref 0–0.2)
BASOPHILS NFR BLD: 1 % (ref 0–2)
BILIRUB SERPL-MCNC: 0.3 MG/DL (ref 0.2–1.1)
BUN SERPL-MCNC: 7 MG/DL (ref 6–23)
CALCIUM SERPL-MCNC: 9.4 MG/DL (ref 8.3–10.4)
CHLORIDE SERPL-SCNC: 100 MMOL/L (ref 98–107)
CO2 SERPL-SCNC: 31 MMOL/L (ref 21–32)
CREAT SERPL-MCNC: 0.9 MG/DL (ref 0.6–1)
DIFFERENTIAL METHOD BLD: ABNORMAL
EOSINOPHIL # BLD: 0.4 K/UL (ref 0–0.8)
EOSINOPHIL NFR BLD: 2 % (ref 0.5–7.8)
ERYTHROCYTE [DISTWIDTH] IN BLOOD BY AUTOMATED COUNT: 17.1 % (ref 11.9–14.6)
GLOBULIN SER CALC-MCNC: 4.3 G/DL
GLUCOSE SERPL-MCNC: 121 MG/DL (ref 65–100)
HCT VFR BLD AUTO: 29.9 % (ref 35.8–46.3)
HGB BLD-MCNC: 9.1 G/DL (ref 11.7–15.4)
IMM GRANULOCYTES # BLD AUTO: 0.1 K/UL (ref 0–0.5)
IMM GRANULOCYTES NFR BLD AUTO: 1 % (ref 0–5)
LYMPHOCYTES # BLD: 1.8 K/UL (ref 0.5–4.6)
LYMPHOCYTES NFR BLD: 11 % (ref 13–44)
MCH RBC QN AUTO: 24.4 PG (ref 26.1–32.9)
MCHC RBC AUTO-ENTMCNC: 30.4 G/DL (ref 31.4–35)
MCV RBC AUTO: 80.2 FL (ref 79.6–97.8)
MONOCYTES # BLD: 1.1 K/UL (ref 0.1–1.3)
MONOCYTES NFR BLD: 6 % (ref 4–12)
NEUTS SEG # BLD: 13.4 K/UL (ref 1.7–8.2)
NEUTS SEG NFR BLD: 79 % (ref 43–78)
NRBC # BLD: 0 K/UL (ref 0–0.2)
PLATELET # BLD AUTO: 901 K/UL (ref 150–450)
PMV BLD AUTO: 9.1 FL (ref 9.4–12.3)
POTASSIUM SERPL-SCNC: 3.2 MMOL/L (ref 3.5–5.1)
PROT SERPL-MCNC: 7 G/DL (ref 6.3–8.2)
RBC # BLD AUTO: 3.73 M/UL (ref 4.05–5.25)
SODIUM SERPL-SCNC: 139 MMOL/L (ref 136–145)
WBC # BLD AUTO: 16.9 K/UL (ref 4.3–11.1)

## 2019-03-26 PROCEDURE — 80053 COMPREHEN METABOLIC PANEL: CPT

## 2019-03-26 PROCEDURE — 85025 COMPLETE CBC W/AUTO DIFF WBC: CPT

## 2019-03-26 NOTE — PROGRESS NOTES
3/26/19 saw pt today with Dr. Guicho Heath for follow up pancreatic cancer. She is still having left sided weakness from stroke. PO intake is fair. Pain is increasing, will increase duragesic patch to 50 mcg. Also going to start zoloft 50 mg for mood. Follow up in 4 weeks. Encouraged to call with any concerns. Pt is not pursuing any treatment. Navigation will sign off.

## 2019-03-29 ENCOUNTER — PATIENT OUTREACH (OUTPATIENT)
Dept: CASE MANAGEMENT | Age: 59
End: 2019-03-29

## 2019-03-29 NOTE — PROGRESS NOTES
Follow up call attempted again, no answer, left message again with my contact information. I will plan to close for ongoing case management needs unless patient calls me back. This note will not be viewable in [address]

## 2019-04-25 ENCOUNTER — HOSPITAL ENCOUNTER (OUTPATIENT)
Dept: LAB | Age: 59
Discharge: HOME | End: 2019-04-25
Payer: COMMERCIAL

## 2019-04-25 DIAGNOSIS — C25.2 MALIGNANT NEOPLASM OF TAIL OF PANCREAS (HCC): ICD-10-CM

## 2019-04-25 LAB
ALBUMIN SERPL-MCNC: 2.8 G/DL (ref 3.5–5)
ALBUMIN/GLOB SERPL: 0.7 {RATIO} (ref 1.2–3.5)
ALP SERPL-CCNC: 162 U/L (ref 50–136)
ALT SERPL-CCNC: 18 U/L (ref 12–65)
ANION GAP SERPL CALC-SCNC: 9 MMOL/L (ref 7–16)
AST SERPL-CCNC: 21 U/L (ref 15–37)
BASOPHILS # BLD: 0.1 K/UL (ref 0–0.2)
BASOPHILS NFR BLD: 1 % (ref 0–2)
BILIRUB SERPL-MCNC: 0.2 MG/DL (ref 0.2–1.1)
BUN SERPL-MCNC: 7 MG/DL (ref 6–23)
CALCIUM SERPL-MCNC: 8.9 MG/DL (ref 8.3–10.4)
CHLORIDE SERPL-SCNC: 96 MMOL/L (ref 98–107)
CO2 SERPL-SCNC: 30 MMOL/L (ref 21–32)
CREAT SERPL-MCNC: 0.84 MG/DL (ref 0.6–1)
DIFFERENTIAL METHOD BLD: ABNORMAL
EOSINOPHIL # BLD: 0.5 K/UL (ref 0–0.8)
EOSINOPHIL NFR BLD: 3 % (ref 0.5–7.8)
ERYTHROCYTE [DISTWIDTH] IN BLOOD BY AUTOMATED COUNT: 18.1 % (ref 11.9–14.6)
GLOBULIN SER CALC-MCNC: 4.2 G/DL (ref 2.3–3.5)
GLUCOSE SERPL-MCNC: 144 MG/DL (ref 65–100)
HCT VFR BLD AUTO: 30.9 % (ref 35.8–46.3)
HGB BLD-MCNC: 9.5 G/DL (ref 11.7–15.4)
IMM GRANULOCYTES # BLD AUTO: 0.1 K/UL (ref 0–0.5)
IMM GRANULOCYTES NFR BLD AUTO: 0 % (ref 0–5)
LYMPHOCYTES # BLD: 2 K/UL (ref 0.5–4.6)
LYMPHOCYTES NFR BLD: 11 % (ref 13–44)
MCH RBC QN AUTO: 23.2 PG (ref 26.1–32.9)
MCHC RBC AUTO-ENTMCNC: 30.7 G/DL (ref 31.4–35)
MCV RBC AUTO: 75.4 FL (ref 79.6–97.8)
MONOCYTES # BLD: 1 K/UL (ref 0.1–1.3)
MONOCYTES NFR BLD: 5 % (ref 4–12)
NEUTS SEG # BLD: 14.2 K/UL (ref 1.7–8.2)
NEUTS SEG NFR BLD: 80 % (ref 43–78)
NRBC # BLD: 0 K/UL (ref 0–0.2)
PLATELET # BLD AUTO: 888 K/UL (ref 150–450)
PMV BLD AUTO: 9.2 FL (ref 9.4–12.3)
POTASSIUM SERPL-SCNC: 2.8 MMOL/L (ref 3.5–5.1)
PROT SERPL-MCNC: 7 G/DL (ref 6.3–8.2)
RBC # BLD AUTO: 4.1 M/UL (ref 4.05–5.25)
SODIUM SERPL-SCNC: 135 MMOL/L (ref 136–145)
WBC # BLD AUTO: 17.8 K/UL (ref 4.3–11.1)

## 2019-04-25 PROCEDURE — 85025 COMPLETE CBC W/AUTO DIFF WBC: CPT

## 2019-04-25 PROCEDURE — 80053 COMPREHEN METABOLIC PANEL: CPT

## 2023-06-21 NOTE — PROGRESS NOTES
TRANSFER - OUT REPORT: 
 
[facility] on Katelyn Crumble  being transferred to Oncology(unit) for routine progression of care Report consisted of patients Situation, Background, Assessment and  
Recommendations(SBAR). Information from the following report(s) Kardex was reviewed with the receiving nurse. Lines:  
Venous Access Device Port 12/13/18 Upper chest (subclavicular area, right (Active) Central Line Being Utilized Yes 1/5/2019  7:50 AM  
Criteria for Appropriate Use Limited/no vessel suitable for conventional peripheral access 1/5/2019  7:50 AM  
Site Assessment Clean, dry, & intact 1/5/2019  7:50 AM  
Dressing Status Clean, dry, & intact 1/5/2019  7:50 AM  
Dressing Type Disk with Chlorhexadine gluconate (CHG) 1/5/2019  7:50 AM  
Positive Blood Return (Medial Site) Yes 1/3/2019 11:51 AM  
Action Taken (Medial Site) Flushed 1/3/2019  1:09 PM  
   
Peripheral IV 01/03/19 Left Antecubital (Active) Site Assessment Clean, dry, & intact 1/5/2019  7:50 AM  
Phlebitis Assessment 0 1/5/2019  7:50 AM  
Infiltration Assessment 0 1/5/2019  7:50 AM  
Dressing Status Clean, dry, & intact 1/5/2019  7:50 AM  
Dressing Type Transparent 1/5/2019  7:50 AM  
Hub Color/Line Status Infusing 1/5/2019  7:50 AM  
  
 
Opportunity for questions and clarification was provided. Patient transported with: 
 [employer] Statement Selected

## (undated) DEVICE — AMD ANTIMICROBIAL GAUZE SPONGES 8 PLY USP TYPE VII: Brand: CURITY

## (undated) DEVICE — REM POLYHESIVE ADULT PATIENT RETURN ELECTRODE: Brand: VALLEYLAB

## (undated) DEVICE — SUTURE PERMAHAND SZ 3-0 L18IN NONABSORBABLE BLK SILK BRAID A184H

## (undated) DEVICE — SUTURE PDS II SZ 1 L96IN ABSRB VLT TP-1 L65MM 1/2 CIR Z880G

## (undated) DEVICE — SHEET, T, LAPAROTOMY, STERILE: Brand: MEDLINE

## (undated) DEVICE — SUTURE PROL SZ 4-0 L36IN NONABSORBABLE BLU L26MM SH 1/2 CIR 8521H

## (undated) DEVICE — GLOVE SURG SZ 6.5 L11.2IN THK8.6MIL LT BRN LTX FREE

## (undated) DEVICE — CONTAINER SPEC FRMLN 120ML --

## (undated) DEVICE — AGENT HEMSTAT W2XL14IN OXIDIZED REGENERATED CELOS ABSRB FOR

## (undated) DEVICE — RELOAD STPL H41XL60MM GRN THCK B FORM NAT ARTC ECHELON

## (undated) DEVICE — INTENDED FOR TISSUE SEPARATION, AND OTHER PROCEDURES THAT REQUIRE A SHARP SURGICAL BLADE TO PUNCTURE OR CUT.: Brand: BARD-PARKER SAFETY BLADES SIZE 11, STERILE

## (undated) DEVICE — SUTURE PROL SZ 4-0 L36IN NONABSORBABLE BLU RB-1 L17MM 8357H

## (undated) DEVICE — DRAPE TWL SURG 16X26IN BLU ORB04] ALLCARE INC]

## (undated) DEVICE — BUTTON SWITCH PENCIL BLADE ELECTRODE, HOLSTER: Brand: EDGE

## (undated) DEVICE — SLIM BODY SKIN STAPLER: Brand: APPOSE ULC

## (undated) DEVICE — STAPLER INT L34CM 60MM LNG ENDOSCP ARTC PWR + ECHELON FLX

## (undated) DEVICE — SUTURE PSD II SZ 1 L27IN ABSRB VLT TP-1 1/2 CIR L65MM SIL Z690G

## (undated) DEVICE — SUT PROL 5-0 36IN RB1 DA BLU --

## (undated) DEVICE — RELOAD STPL L75MM OPN H3.8MM CLS 1.5MM WIRE DIA0.2MM REG

## (undated) DEVICE — SUTURE PROL SZ 2-0 L36IN NONABSORBABLE BLU SH L26MM 1/2 CIR 8523H

## (undated) DEVICE — BLADE ELECTRODE: Brand: EDGE

## (undated) DEVICE — TRAY CATH 16F URIN MTR LTX -- CONVERT TO ITEM 363111

## (undated) DEVICE — SUTURE PERMAHAND SZ 3-0 L18IN NONABSORBABLE BLK L26MM SH C013D

## (undated) DEVICE — SUTURE PERMAHAND SZ 0 L30IN NONABSORBABLE BLK SILK BRAID A306H

## (undated) DEVICE — TRAY CATH 16F DRN BG LTX -- CONVERT TO ITEM 363158

## (undated) DEVICE — SPONGE LAP 18X18IN STRL -- 5/PK

## (undated) DEVICE — SUT PROL 4-0 36IN RB1 DA BLU --

## (undated) DEVICE — RELOAD STPL H1.5X3.6XL60MM REG TISS BLU B FRM AUTO RET PIN

## (undated) DEVICE — SURGICAL PROCEDURE PACK BASIC ST FRANCIS

## (undated) DEVICE — 3M™ TEGADERM™ TRANSPARENT FILM DRESSING FRAME STYLE, 1626W, 4 IN X 4-3/4 IN (10 CM X 12 CM), 50/CT 4CT/CASE: Brand: 3M™ TEGADERM™

## (undated) DEVICE — CONTAINER SPEC HISTOLOGY 900ML POLYPR

## (undated) DEVICE — UNIVERSAL STAPLER: Brand: ENDO GIA ULTRA

## (undated) DEVICE — STAPLER INT L75MM CUT LN L73MM STPL LN L77MM BLU B FRM 8

## (undated) DEVICE — SUTURE SZ 0 27IN 5/8 CIR UR-6  TAPER PT VIOLET ABSRB VICRYL J603H

## (undated) DEVICE — MASTISOL ADHESIVE LIQ 2/3ML

## (undated) DEVICE — SUTURE PDS II SZ 7-0 L30IN ABSRB VLT L9.3MM BV-1 3/8 CIR Z135H

## (undated) DEVICE — SUTURE PERMAHAND SZ 0 L18IN NONABSORBABLE BLK SILK BRAID A186H

## (undated) DEVICE — DRAPE,T,LAPARO,TRANS,STERILE: Brand: MEDLINE

## (undated) DEVICE — Device

## (undated) DEVICE — SOLUTION IV 1000ML 0.9% SOD CHL

## (undated) DEVICE — [HIGH FLOW INSUFFLATOR,  DO NOT USE IF PACKAGE IS DAMAGED,  KEEP DRY,  KEEP AWAY FROM SUNLIGHT,  PROTECT FROM HEAT AND RADIOACTIVE SOURCES.]: Brand: PNEUMOSURE

## (undated) DEVICE — INTENDED FOR TISSUE SEPARATION, AND OTHER PROCEDURES THAT REQUIRE A SHARP SURGICAL BLADE TO PUNCTURE OR CUT.: Brand: BARD-PARKER SAFETY BLADES SIZE 10, STERILE

## (undated) DEVICE — PVC FEEDING TUBE,NO RADIOPAQUE LINE: Brand: KANGAROO

## (undated) DEVICE — SUTURE VCRL SZ 4-0 L27IN ABSRB UD L19MM PS-2 3/8 CIR PRIM J426H

## (undated) DEVICE — SUTURE COAT VCRL SZ 4-0 L18IN ABSRB UD L19MM PS-2 1/2 CIR J496G

## (undated) DEVICE — AMD ANTIMICROBIAL GAUZE SPONGES,12 PLY USP TYPE VII, 0.2% POLYHEXAMETHYLENE BIGUANIDE HCI (PHMB): Brand: CURITY

## (undated) DEVICE — SHEARS ENDOSCP L23CM DIA5MM ULTRASONIC CRV TIP W/ ADV

## (undated) DEVICE — INTENDED TO BE USED TO OCCLUDE, RETRACT AND IDENTIFY ARTERIES, VEINS, TENDONS AND NERVES IN SURGICAL PROCEDURES: Brand: STERION®  VESSEL LOOP

## (undated) DEVICE — STANDARD HYPODERMIC NEEDLE,POLYPROPYLENE HUB: Brand: MONOJECT

## (undated) DEVICE — DRAPE IRRIG FLD WRM W44XL44IN W/ AORN STD PRTBL INTRATEMP

## (undated) DEVICE — SYRINGE MED 5ML STD CLR PLAS N CTRL SLIP TIP DISP

## (undated) DEVICE — SUTURE VCRL SZ 3-0 L27IN ABSRB UD L26MM SH 1/2 CIR J416H

## (undated) DEVICE — GOWN,SIRUS,NONRNF,SETINSLV,XL,20/CS: Brand: MEDLINE

## (undated) DEVICE — SYR 10ML LUER LOK 1/5ML GRAD --

## (undated) DEVICE — DRAPE XR C ARM 41X74IN LF --

## (undated) DEVICE — LAP CHOLE: Brand: MEDLINE INDUSTRIES, INC.

## (undated) DEVICE — VISUALIZATION SYSTEM: Brand: CLEARIFY

## (undated) DEVICE — DUAL LUMEN STOMACH TUBE: Brand: SALEM SUMP

## (undated) DEVICE — SOLUTION IRRIG 1000ML H2O STRL BLT

## (undated) DEVICE — 2000CC GUARDIAN II: Brand: GUARDIAN

## (undated) DEVICE — (D)PREP SKN CHLRAPRP APPL 26ML -- CONVERT TO ITEM 371833

## (undated) DEVICE — SUT SLK 3-0 18IN SH DETACH BLK --

## (undated) DEVICE — STAPLER INT 75MM CUT LN L73MM STPL LN L77MM LNAR B-FORM

## (undated) DEVICE — STAPLER INT L60MM STD TISS BLU 7/8 FIRING W/ 3.5MM 21 TI

## (undated) DEVICE — SUTURE VCRL SZ 3-0 L18IN ABSRB UD L26MM SH 1/2 CIR J864D

## (undated) DEVICE — 3M™ IOBAN™ 2 ANTIMICROBIAL INCISE DRAPE 6650EZ: Brand: IOBAN™ 2

## (undated) DEVICE — NEEDLE BX 14GA LAIN 20MM SPEC NOTCH SCALP SHRP SURG STL CUT

## (undated) DEVICE — RELOAD STPL L60MM H1-2.6MM MESENTERY THN TISS WHT 6 ROW

## (undated) DEVICE — COVER US PRB W12XL244CM FLD IORT STR TIP

## (undated) DEVICE — DRAIN SURG L49IN DIA1/4IN 10IN H SIL W/O TRCR END PERF

## (undated) DEVICE — SUTURE PDS II SZ 5-0 L30IN ABSRB VLT C-1 L13MM 3/8 CIR Z126H

## (undated) DEVICE — SYR BULB 60ML IRRIGATION -- CONVERT TO ITEM 116413

## (undated) DEVICE — SUT PDS II 6-0 30IN BV1 DA VIO --

## (undated) DEVICE — (D)STRIP SKN CLSR 0.5X4IN WHT --

## (undated) DEVICE — BLADELESS OPTICAL TROCAR WITH FIXATION CANNULA: Brand: VERSAPORT

## (undated) DEVICE — SUTURE PERMAHAND SZ 2-0 L12X18IN NONABSORBABLE BLK SILK A185H

## (undated) DEVICE — BAG DRAIN BILE TUBE T 19OZ --

## (undated) DEVICE — SUTURE PDS II SZ 5-0 L30IN ABSRB VLT RB-1 L17MM 1/2 CIR Z320H

## (undated) DEVICE — INTENDED FOR TISSUE SEPARATION, AND OTHER PROCEDURES THAT REQUIRE A SHARP SURGICAL BLADE TO PUNCTURE OR CUT.: Brand: BARD-PARKER SAFETY BLADES SIZE 15, STERILE

## (undated) DEVICE — FEEDING TUBE • RADIOPAQUE: Brand: ARGYLE

## (undated) DEVICE — DRAIN JACKSON-PRT 19FR W/TROC: Brand: CARDINAL HEALTH

## (undated) DEVICE — SUT SLK 2-0SH 30IN BLK --

## (undated) DEVICE — SUTURE PERMAHAND SZ 4-0 L10X30IN NONABSORBABLE BLK SILK SA83H

## (undated) DEVICE — KENDALL SCD EXPRESS SLEEVES, KNEE LENGTH, MEDIUM: Brand: KENDALL SCD